# Patient Record
Sex: MALE | Race: WHITE | NOT HISPANIC OR LATINO | Employment: OTHER | URBAN - NONMETROPOLITAN AREA
[De-identification: names, ages, dates, MRNs, and addresses within clinical notes are randomized per-mention and may not be internally consistent; named-entity substitution may affect disease eponyms.]

---

## 2023-12-07 ENCOUNTER — TELEPHONE (OUTPATIENT)
Dept: VASCULAR SURGERY | Facility: CLINIC | Age: 48
End: 2023-12-07

## 2023-12-11 ENCOUNTER — TELEPHONE (OUTPATIENT)
Dept: VASCULAR SURGERY | Facility: CLINIC | Age: 48
End: 2023-12-11
Payer: COMMERCIAL

## 2023-12-12 ENCOUNTER — OFFICE VISIT (OUTPATIENT)
Dept: VASCULAR SURGERY | Facility: CLINIC | Age: 48
End: 2023-12-12
Payer: COMMERCIAL

## 2023-12-12 VITALS
DIASTOLIC BLOOD PRESSURE: 80 MMHG | HEIGHT: 71 IN | HEART RATE: 90 BPM | WEIGHT: 187.4 LBS | SYSTOLIC BLOOD PRESSURE: 128 MMHG | OXYGEN SATURATION: 96 % | BODY MASS INDEX: 26.23 KG/M2

## 2023-12-12 DIAGNOSIS — I73.9 PAD (PERIPHERAL ARTERY DISEASE): ICD-10-CM

## 2023-12-12 DIAGNOSIS — I65.23 BILATERAL CAROTID ARTERY STENOSIS: ICD-10-CM

## 2023-12-12 DIAGNOSIS — I71.21 ANEURYSM OF ASCENDING AORTA WITHOUT RUPTURE: Primary | ICD-10-CM

## 2023-12-12 DIAGNOSIS — Z72.0 TOBACCO ABUSE: ICD-10-CM

## 2023-12-12 PROCEDURE — 99204 OFFICE O/P NEW MOD 45 MIN: CPT | Performed by: SURGERY

## 2023-12-12 RX ORDER — FOLIC ACID 1 MG/1
TABLET ORAL
COMMUNITY
Start: 2023-11-02

## 2023-12-12 RX ORDER — PAROXETINE HYDROCHLORIDE 40 MG/1
1 TABLET, FILM COATED ORAL DAILY
COMMUNITY
Start: 2023-10-02

## 2023-12-12 RX ORDER — HYDROXYZINE HYDROCHLORIDE 25 MG/1
25 TABLET, FILM COATED ORAL 3 TIMES DAILY PRN
COMMUNITY

## 2023-12-12 RX ORDER — DIAZEPAM 5 MG/1
5 TABLET ORAL EVERY 8 HOURS PRN
COMMUNITY
Start: 2023-10-02

## 2023-12-12 RX ORDER — TRAZODONE HYDROCHLORIDE 100 MG/1
100 TABLET ORAL DAILY
COMMUNITY

## 2023-12-12 RX ORDER — NALTREXONE HYDROCHLORIDE 50 MG/1
1 TABLET, FILM COATED ORAL DAILY
COMMUNITY
Start: 2023-10-02

## 2023-12-12 RX ORDER — BUSPIRONE HYDROCHLORIDE 15 MG/1
15 TABLET ORAL 3 TIMES DAILY
COMMUNITY

## 2023-12-12 RX ORDER — ASPIRIN 81 MG/1
81 TABLET ORAL DAILY
Start: 2023-12-12

## 2023-12-12 RX ORDER — SERTRALINE HYDROCHLORIDE 100 MG/1
2 TABLET, FILM COATED ORAL DAILY
COMMUNITY
Start: 2023-11-14

## 2023-12-12 RX ORDER — QUETIAPINE FUMARATE 100 MG/1
100 TABLET, FILM COATED ORAL
COMMUNITY

## 2023-12-12 RX ORDER — PANTOPRAZOLE SODIUM 40 MG/1
40 TABLET, DELAYED RELEASE ORAL DAILY
COMMUNITY

## 2023-12-12 RX ORDER — ATORVASTATIN CALCIUM 10 MG/1
10 TABLET, FILM COATED ORAL DAILY
Qty: 90 TABLET | Refills: 0 | Status: SHIPPED | OUTPATIENT
Start: 2023-12-12

## 2023-12-12 RX ORDER — PRAZOSIN HYDROCHLORIDE 2 MG/1
2 CAPSULE ORAL NIGHTLY
COMMUNITY
Start: 2023-12-01

## 2023-12-12 RX ORDER — NICOTINE 14 MG/24H
1 PATCH, EXTENDED RELEASE TRANSDERMAL EVERY 24 HOURS
COMMUNITY
Start: 2023-10-16

## 2023-12-12 NOTE — PROGRESS NOTES
12/12/2023      Red Pittman MD  [address] [place] [id number]    Nic Castro  1975    Chief Complaint   Patient presents with    Aortic Aneurysm     RED PITTMAN TO VASCULAR SURGERY FOR THORACIC AORTIC ANEURYSM WITHOUT RUPTURE UNSPECIFIED. 5.4  Smoker- 30 years 1/2 pack a day, dizziness, anxiety up, energy is low  Would like something for nerves       Dear Red Pittman MD    HPI  I had the pleasure of seeing your patient Nic Castro in the office today.  Thank you kindly for this consultation.  As you recall, Nic Castro is a 48 y.o.  male who you are currently following for routine health maintenance.  He was found to have an ascending aortic aneurysm measuring 5.9 cm.  He is anxious.  He is a current daily smoker.  He is having some shortness of breath with exertion and dizziness.  He did have noninvasive testing performed which I did personally review.        Past Medical History:   Diagnosis Date    Anxiety     Pancreatitis     Substance abuse        Past Surgical History:   Procedure Laterality Date    ANKLE LIGAMENT RECONSTRUCTION Left 2015       No family history on file.    Social History     Socioeconomic History    Marital status:    Tobacco Use    Smoking status: Every Day     Packs/day: .5     Types: Cigarettes     Start date: 2003    Smokeless tobacco: Never   Substance and Sexual Activity    Alcohol use: Not Currently    Drug use: Never    Sexual activity: Defer       Not on File      Current Outpatient Medications:     hydrOXYzine (ATARAX) 25 MG tablet, Take 1 tablet by mouth 3 (Three) Times a Day As Needed., Disp: , Rfl:     PARoxetine (PAXIL) 40 MG tablet, Take 1 tablet by mouth Daily., Disp: , Rfl:     prazosin (MINIPRESS) 2 MG capsule, Take 1 capsule by mouth Every Night., Disp: , Rfl:     QUEtiapine (SEROquel) 100 MG tablet, Take 1 tablet by mouth every night at bedtime., Disp: , Rfl:     sertraline (ZOLOFT) 100 MG tablet, Take 2 tablets by mouth  "Daily., Disp: , Rfl:     traZODone (DESYREL) 100 MG tablet, Take 1 tablet by mouth Daily., Disp: , Rfl:     aspirin 81 MG EC tablet, Take 1 tablet by mouth Daily., Disp: , Rfl:     atorvastatin (Lipitor) 10 MG tablet, Take 1 tablet by mouth Daily., Disp: 90 tablet, Rfl: 0    busPIRone (BUSPAR) 15 MG tablet, Take 1 tablet by mouth 3 (Three) Times a Day. (Patient not taking: Reported on 12/12/2023), Disp: , Rfl:     diazePAM (VALIUM) 5 MG tablet, Take 1 tablet by mouth Every 8 (Eight) Hours As Needed. for anxiety (Patient not taking: Reported on 12/12/2023), Disp: , Rfl:     folic acid (FOLVITE) 1 MG tablet, , Disp: , Rfl:     naltrexone (DEPADE) 50 MG tablet, Take 1 tablet by mouth Daily. (Patient not taking: Reported on 12/12/2023), Disp: , Rfl:     pantoprazole (PROTONIX) 40 MG EC tablet, Take 1 tablet by mouth Daily. (Patient not taking: Reported on 12/12/2023), Disp: , Rfl:     SM Nicotine 14 MG/24HR patch, Place 1 patch on the skin as directed by provider Daily. (Patient not taking: Reported on 12/12/2023), Disp: , Rfl:     Review of Systems   Constitutional: Negative.    HENT: Negative.     Eyes: Negative.    Respiratory:  Positive for shortness of breath.    Cardiovascular: Negative.    Gastrointestinal: Negative.    Endocrine: Negative.    Genitourinary: Negative.    Musculoskeletal: Negative.    Skin: Negative.    Allergic/Immunologic: Negative.    Neurological:  Positive for dizziness.   Hematological: Negative.    Psychiatric/Behavioral:  The patient is nervous/anxious.    All other systems reviewed and are negative.    /80 (BP Location: Left arm, Patient Position: Sitting, Cuff Size: Adult)   Pulse 90   Ht 180.3 cm (71\")   Wt 85 kg (187 lb 6.4 oz)   SpO2 96%   BMI 26.14 kg/m²     Physical Exam  Vitals and nursing note reviewed.   Constitutional:       Appearance: Normal appearance. He is well-developed.   HENT:      Head: Normocephalic and atraumatic.   Eyes:      General: No scleral icterus.   "   Pupils: Pupils are equal, round, and reactive to light.   Neck:      Thyroid: No thyromegaly.      Vascular: No carotid bruit or JVD.   Cardiovascular:      Rate and Rhythm: Normal rate and regular rhythm.      Pulses:           Carotid pulses are 2+ on the right side and 2+ on the left side.       Femoral pulses are 2+ on the right side and 2+ on the left side.       Popliteal pulses are 2+ on the right side and 2+ on the left side.        Dorsalis pedis pulses are 2+ on the right side and 2+ on the left side.        Posterior tibial pulses are 2+ on the right side and 2+ on the left side.      Heart sounds: Normal heart sounds.   Pulmonary:      Effort: Pulmonary effort is normal.      Breath sounds: Normal breath sounds.   Abdominal:      General: Bowel sounds are normal. There is no distension or abdominal bruit.      Palpations: Abdomen is soft. There is no mass.      Tenderness: There is no abdominal tenderness.   Musculoskeletal:         General: Normal range of motion.      Cervical back: Neck supple.   Lymphadenopathy:      Cervical: No cervical adenopathy.   Skin:     General: Skin is warm and dry.   Neurological:      Mental Status: He is alert and oriented to person, place, and time.      Cranial Nerves: No cranial nerve deficit.      Sensory: No sensory deficit.         There is no problem list on file for this patient.       Diagnosis Plan   1. Aneurysm of ascending aorta without rupture  Ambulatory Referral to Cardiothoracic Surgery      2. Bilateral carotid artery stenosis  US Carotid Bilateral      3. PAD (peripheral artery disease)  US Ankle / Brachial Indices Extremity Complete      4. Tobacco abuse            Plan: After thoroughly evaluating Nic Castro, I believe the best course of action is to refer to cardiothoracic for aneurysm repair. I did review his testing which shows ascending aortic aneurysm measuring 5.9 cm.   I would like him to begin aspirin EC 81 mg daily and Lipitor 10 mg  daily in addition to his other medications.  I will see him back in 6 months with screening carotid duplex and ABIs.  Previous ultrasound did not show abdominal aneurysm.  We did discuss smoking cessation and that smoking is the #1 risk factor for aneurysm formation and rapid growth. The patient is to continue taking their medications as previously discussed.   This was all discussed in full with complete understanding.  Thank you for allowing me to participate in the care of your patient.  Please do not hesitate to call with any questions or concerns.  We will keep you aware of any further encounters with Nic Stewartland.        Sincerely yours,         DO Toya Wang Allen Lee, MD

## 2023-12-21 ENCOUNTER — LAB (OUTPATIENT)
Dept: LAB | Facility: HOSPITAL | Age: 48
End: 2023-12-21
Payer: COMMERCIAL

## 2023-12-21 ENCOUNTER — OFFICE VISIT (OUTPATIENT)
Dept: CARDIAC SURGERY | Facility: CLINIC | Age: 48
End: 2023-12-21
Payer: COMMERCIAL

## 2023-12-21 VITALS
HEIGHT: 71 IN | OXYGEN SATURATION: 98 % | SYSTOLIC BLOOD PRESSURE: 121 MMHG | BODY MASS INDEX: 26.74 KG/M2 | WEIGHT: 191 LBS | HEART RATE: 102 BPM | DIASTOLIC BLOOD PRESSURE: 82 MMHG

## 2023-12-21 DIAGNOSIS — I71.21 ANEURYSM OF THE ASCENDING AORTA, WITHOUT RUPTURE: ICD-10-CM

## 2023-12-21 DIAGNOSIS — I71.21 ANEURYSM OF THE ASCENDING AORTA, WITHOUT RUPTURE: Primary | ICD-10-CM

## 2023-12-21 DIAGNOSIS — R73.9 HYPERGLYCEMIA: Primary | ICD-10-CM

## 2023-12-21 DIAGNOSIS — R73.9 HYPERGLYCEMIA: ICD-10-CM

## 2023-12-21 LAB
ALBUMIN SERPL-MCNC: 4.2 G/DL (ref 3.5–5.2)
ALBUMIN/GLOB SERPL: 1.4 G/DL
ALP SERPL-CCNC: 83 U/L (ref 39–117)
ALT SERPL W P-5'-P-CCNC: 61 U/L (ref 1–41)
ANION GAP SERPL CALCULATED.3IONS-SCNC: 10 MMOL/L (ref 5–15)
AST SERPL-CCNC: 47 U/L (ref 1–40)
BASOPHILS # BLD AUTO: 0.07 10*3/MM3 (ref 0–0.2)
BASOPHILS NFR BLD AUTO: 0.8 % (ref 0–1.5)
BILIRUB SERPL-MCNC: 0.5 MG/DL (ref 0–1.2)
BUN SERPL-MCNC: 9 MG/DL (ref 6–20)
BUN/CREAT SERPL: 11.1 (ref 7–25)
CALCIUM SPEC-SCNC: 9.4 MG/DL (ref 8.6–10.5)
CHLORIDE SERPL-SCNC: 102 MMOL/L (ref 98–107)
CO2 SERPL-SCNC: 26 MMOL/L (ref 22–29)
CREAT SERPL-MCNC: 0.81 MG/DL (ref 0.76–1.27)
DEPRECATED RDW RBC AUTO: 40.3 FL (ref 37–54)
EGFRCR SERPLBLD CKD-EPI 2021: 107.4 ML/MIN/1.73
EOSINOPHIL # BLD AUTO: 0.36 10*3/MM3 (ref 0–0.4)
EOSINOPHIL NFR BLD AUTO: 4 % (ref 0.3–6.2)
ERYTHROCYTE [DISTWIDTH] IN BLOOD BY AUTOMATED COUNT: 13.2 % (ref 12.3–15.4)
GLOBULIN UR ELPH-MCNC: 3.1 GM/DL
GLUCOSE SERPL-MCNC: 92 MG/DL (ref 65–99)
HBA1C MFR BLD: 6.2 % (ref 4.8–5.6)
HCT VFR BLD AUTO: 45.5 % (ref 37.5–51)
HGB BLD-MCNC: 14.6 G/DL (ref 13–17.7)
IMM GRANULOCYTES # BLD AUTO: 0.03 10*3/MM3 (ref 0–0.05)
IMM GRANULOCYTES NFR BLD AUTO: 0.3 % (ref 0–0.5)
LYMPHOCYTES # BLD AUTO: 2.35 10*3/MM3 (ref 0.7–3.1)
LYMPHOCYTES NFR BLD AUTO: 26.3 % (ref 19.6–45.3)
MCH RBC QN AUTO: 27.3 PG (ref 26.6–33)
MCHC RBC AUTO-ENTMCNC: 32.1 G/DL (ref 31.5–35.7)
MCV RBC AUTO: 85 FL (ref 79–97)
MONOCYTES # BLD AUTO: 0.91 10*3/MM3 (ref 0.1–0.9)
MONOCYTES NFR BLD AUTO: 10.2 % (ref 5–12)
NEUTROPHILS NFR BLD AUTO: 5.2 10*3/MM3 (ref 1.7–7)
NEUTROPHILS NFR BLD AUTO: 58.4 % (ref 42.7–76)
NRBC BLD AUTO-RTO: 0 /100 WBC (ref 0–0.2)
PLATELET # BLD AUTO: 211 10*3/MM3 (ref 140–450)
PMV BLD AUTO: 9.5 FL (ref 6–12)
POTASSIUM SERPL-SCNC: 4.3 MMOL/L (ref 3.5–5.2)
PROT SERPL-MCNC: 7.3 G/DL (ref 6–8.5)
RBC # BLD AUTO: 5.35 10*6/MM3 (ref 4.14–5.8)
SODIUM SERPL-SCNC: 138 MMOL/L (ref 136–145)
WBC NRBC COR # BLD AUTO: 8.92 10*3/MM3 (ref 3.4–10.8)

## 2023-12-21 PROCEDURE — 80053 COMPREHEN METABOLIC PANEL: CPT

## 2023-12-21 PROCEDURE — 36415 COLL VENOUS BLD VENIPUNCTURE: CPT

## 2023-12-21 PROCEDURE — 85025 COMPLETE CBC W/AUTO DIFF WBC: CPT

## 2023-12-21 PROCEDURE — 83036 HEMOGLOBIN GLYCOSYLATED A1C: CPT

## 2023-12-21 RX ORDER — ARIPIPRAZOLE 5 MG/1
1 TABLET ORAL DAILY
COMMUNITY
Start: 2023-12-19

## 2023-12-21 RX ORDER — CLONAZEPAM 0.5 MG/1
1 TABLET ORAL EVERY 12 HOURS SCHEDULED
COMMUNITY
Start: 2023-12-19

## 2023-12-21 NOTE — PROGRESS NOTES
Cardiothoracic Surgery Consultation    Referring Physician: Dr. Kyle Villagran    Primary Care Physician: Dr. Jhon Pittman    Chief Complaint   Patient presents with    Aortic Aneurysm     New patient from Dr Villagran         Subjective     History of Present Illness    Nic Castro is a 48-year-old male who presents for evaluation of an aortic aneurysm. He is accompanied by his wife.    The patient reports that about 4 years ago, he was having some dizziness and had a strange sharp pain in his stomach. The abdominal pain did not last very long, but he still felt dizzy, so he went to the hospital. He was discovered to have an aortic aneurysm that was 3.5 cm, however they would not operate on them until they were 5.5 cm. He had another CT scan between then and the one he just had, and it showed the aortic aneurysm in his chest was changing.     He has received conflicting information about the size of his aortic aneurysm.     He denies chest pain but does sometimes feels tight in his chest.    He denies any heart problems before. He denies any stroke or mini stroke that he is aware of. He is otherwise healthy.    He denies any surgery on his heart, lungs, or chest.     He does not go to the dentist regularly. He has 6 teeth that could be of concern. He does not have dental insurance.    He has no known family history of aortic aneurysms.       He smokes about half a pack of cigarettes a day. He has been trying to quit lately. He denies any illegal drug use.    He denies any family history of aortic aneurysm.      Review of Systems     A complete review of systems was performed, is negative except stated above.    Past Medical History:   Diagnosis Date    Anxiety     Pancreatitis     Substance abuse      Past Surgical History:   Procedure Laterality Date    ANKLE LIGAMENT RECONSTRUCTION Left 2015     History reviewed. No pertinent family history.  Social History     Tobacco Use    Smoking status: Every Day      "Packs/day: .5     Types: Cigarettes     Start date: 2003    Smokeless tobacco: Never   Substance Use Topics    Alcohol use: Not Currently    Drug use: Never     Current Outpatient Medications   Medication Sig Dispense Refill    ARIPiprazole (ABILIFY) 5 MG tablet Take 1 tablet by mouth Daily.      aspirin 81 MG EC tablet Take 1 tablet by mouth Daily.      atorvastatin (Lipitor) 10 MG tablet Take 1 tablet by mouth Daily. 90 tablet 0    busPIRone (BUSPAR) 15 MG tablet Take 1 tablet by mouth 3 (Three) Times a Day.      clonazePAM (KlonoPIN) 0.5 MG tablet Take 1 tablet by mouth Every 12 (Twelve) Hours.      diazePAM (VALIUM) 5 MG tablet Take 1 tablet by mouth Every 8 (Eight) Hours As Needed. for anxiety      folic acid (FOLVITE) 1 MG tablet       hydrOXYzine (ATARAX) 25 MG tablet Take 1 tablet by mouth 3 (Three) Times a Day As Needed.      naltrexone (DEPADE) 50 MG tablet Take 1 tablet by mouth Daily.      pantoprazole (PROTONIX) 40 MG EC tablet Take 1 tablet by mouth Daily.      PARoxetine (PAXIL) 40 MG tablet Take 1 tablet by mouth Daily.      prazosin (MINIPRESS) 2 MG capsule Take 1 capsule by mouth Every Night.      QUEtiapine (SEROquel) 100 MG tablet Take 1 tablet by mouth every night at bedtime.      sertraline (ZOLOFT) 100 MG tablet Take 2 tablets by mouth Daily.      SM Nicotine 14 MG/24HR patch Place 1 patch on the skin as directed by provider Daily.      traZODone (DESYREL) 100 MG tablet Take 1 tablet by mouth Daily.       No current facility-administered medications for this visit.     Allergies:  Patient has no known allergies.    Objective      Vital Signs  Visit Vitals  /82 (BP Location: Right arm, Patient Position: Sitting, Cuff Size: Adult)   Pulse 102   Ht 180.3 cm (71\")   Wt 86.6 kg (191 lb)   SpO2 98%   BMI 26.64 kg/m²         Physical Exam  Vitals and nursing note reviewed.   Constitutional:       Appearance: He is well-developed.   HENT:      Head: Normocephalic.   Neck:      Thyroid: No " thyroid mass.      Vascular: No carotid bruit or JVD.      Trachea: Trachea and phonation normal.   Cardiovascular:      Rate and Rhythm: Normal rate and regular rhythm.      Pulses:           Radial pulses are 2+ on the right side and 2+ on the left side.        Posterior tibial pulses are 2+ on the right side and 2+ on the left side.      Heart sounds: Normal heart sounds. No murmur heard.     No friction rub. No gallop.   Pulmonary:      Effort: Pulmonary effort is normal. No respiratory distress.      Breath sounds: Normal breath sounds. No wheezing or rales.   Musculoskeletal:         General: No swelling. Normal range of motion.      Cervical back: Neck supple.   Skin:     General: Skin is warm and dry.      Capillary Refill: Capillary refill takes less than 2 seconds.      Findings: No rash.   Neurological:      Mental Status: He is alert and oriented to person, place, and time.   Psychiatric:         Speech: Speech normal.         Behavior: Behavior normal.         Thought Content: Thought content normal.         Judgment: Judgment normal.         Results Review:   WBC   Date Value Ref Range Status   12/21/2023 8.92 3.40 - 10.80 10*3/mm3 Final     RBC   Date Value Ref Range Status   12/21/2023 5.35 4.14 - 5.80 10*6/mm3 Final     Hemoglobin   Date Value Ref Range Status   12/21/2023 14.6 13.0 - 17.7 g/dL Final     Hematocrit   Date Value Ref Range Status   12/21/2023 45.5 37.5 - 51.0 % Final     MCV   Date Value Ref Range Status   12/21/2023 85.0 79.0 - 97.0 fL Final     MCH   Date Value Ref Range Status   12/21/2023 27.3 26.6 - 33.0 pg Final     MCHC   Date Value Ref Range Status   12/21/2023 32.1 31.5 - 35.7 g/dL Final     RDW   Date Value Ref Range Status   12/21/2023 13.2 12.3 - 15.4 % Final     RDW-SD   Date Value Ref Range Status   12/21/2023 40.3 37.0 - 54.0 fl Final     MPV   Date Value Ref Range Status   12/21/2023 9.5 6.0 - 12.0 fL Final     Platelets   Date Value Ref Range Status   12/21/2023 211  140 - 450 10*3/mm3 Final     Neutrophil %   Date Value Ref Range Status   12/21/2023 58.4 42.7 - 76.0 % Final     Lymphocyte %   Date Value Ref Range Status   12/21/2023 26.3 19.6 - 45.3 % Final     Monocyte %   Date Value Ref Range Status   12/21/2023 10.2 5.0 - 12.0 % Final     Eosinophil %   Date Value Ref Range Status   12/21/2023 4.0 0.3 - 6.2 % Final     Basophil %   Date Value Ref Range Status   12/21/2023 0.8 0.0 - 1.5 % Final     Immature Grans %   Date Value Ref Range Status   12/21/2023 0.3 0.0 - 0.5 % Final     Neutrophils, Absolute   Date Value Ref Range Status   12/21/2023 5.20 1.70 - 7.00 10*3/mm3 Final     Lymphocytes, Absolute   Date Value Ref Range Status   12/21/2023 2.35 0.70 - 3.10 10*3/mm3 Final     Monocytes, Absolute   Date Value Ref Range Status   12/21/2023 0.91 (H) 0.10 - 0.90 10*3/mm3 Final     Eosinophils, Absolute   Date Value Ref Range Status   12/21/2023 0.36 0.00 - 0.40 10*3/mm3 Final     Basophils, Absolute   Date Value Ref Range Status   12/21/2023 0.07 0.00 - 0.20 10*3/mm3 Final     Immature Grans, Absolute   Date Value Ref Range Status   12/21/2023 0.03 0.00 - 0.05 10*3/mm3 Final     nRBC   Date Value Ref Range Status   12/21/2023 0.0 0.0 - 0.2 /100 WBC Final     Glucose   Date Value Ref Range Status   12/21/2023 92 65 - 99 mg/dL Final     Sodium   Date Value Ref Range Status   12/21/2023 138 136 - 145 mmol/L Final   11/06/2023 139 135 - 145 mmol/L Final     Potassium   Date Value Ref Range Status   12/21/2023 4.3 3.5 - 5.2 mmol/L Final     Comment:     Slight hemolysis detected by analyzer. Result may be falsely elevated.   11/06/2023 3.7 3.5 - 5.0 mmol/L Final     CO2   Date Value Ref Range Status   12/21/2023 26.0 22.0 - 29.0 mmol/L Final     Total CO2   Date Value Ref Range Status   11/06/2023 29 21 - 32 mmol/L Final     Chloride   Date Value Ref Range Status   12/21/2023 102 98 - 107 mmol/L Final   11/06/2023 102 98 - 107 mmol/L Final     Anion Gap   Date Value Ref Range  Status   12/21/2023 10.0 5.0 - 15.0 mmol/L Final   11/06/2023 8 6 - 16 mmol/L Final     Creatinine   Date Value Ref Range Status   12/21/2023 0.81 0.76 - 1.27 mg/dL Final   11/06/2023 0.92 0.60 - 1.30 mg/dL Final     BUN   Date Value Ref Range Status   12/21/2023 9 6 - 20 mg/dL Final   11/06/2023 10 7 - 18 mg/dL Final     BUN/Creatinine Ratio   Date Value Ref Range Status   12/21/2023 11.1 7.0 - 25.0 Final   11/06/2023 10.9 11.7 - 13.9 Final     Calcium   Date Value Ref Range Status   12/21/2023 9.4 8.6 - 10.5 mg/dL Final   11/06/2023 9.0 8.5 - 10.1 mg/dL Final     Comment:     Calcium measurements are adversely affected by the use of Omniscan during MRI. Analysis of calcium is not recommended for 12 to 24 hours after the use of the contrast agent.      eGFR Non  Am   Date Value Ref Range Status   11/06/2023 >60.0 >=60.0 mL/min/1.73m2 Final     Alkaline Phosphatase   Date Value Ref Range Status   12/21/2023 83 39 - 117 U/L Final   11/06/2023 62 50 - 136 U/L Final     Total Protein   Date Value Ref Range Status   12/21/2023 7.3 6.0 - 8.5 g/dL Final   11/06/2023 7.2 6.4 - 8.2 g/dL Final     ALT (SGPT)   Date Value Ref Range Status   12/21/2023 61 (H) 1 - 41 U/L Final   11/06/2023 62 16 - 62 U/L Final     AST (SGOT)   Date Value Ref Range Status   12/21/2023 47 (H) 1 - 40 U/L Final   11/06/2023 37 (H) 7 - 34 U/L Final     Total Bilirubin   Date Value Ref Range Status   12/21/2023 0.5 0.0 - 1.2 mg/dL Final   11/06/2023 0.3 0.0 - 1.0 mg/dL Final     Comment:     Use of this assay is not recommended for patients undergoing treatment with eltrombopag due to the potential for falsely elevated results.     Albumin   Date Value Ref Range Status   12/21/2023 4.2 3.5 - 5.2 g/dL Final   11/06/2023 3.6 3.4 - 5.0 g/dL Final     Globulin   Date Value Ref Range Status   12/21/2023 3.1 gm/dL Final        I reviewed the patient's clinical results and discussed with patient.    CT Chest:: CT chest with contrast performed on  11/06/2023.  FINDINGS:     Lung parenchyma and central airways: Normal.   Pleura: Normal.   Heart and great vessels: Heart size is normal. Some coronary artery   calcification is noted. There is also some scattered ossification of   the pericardium. There is significant enlargement of the ascending   thoracic aorta. The diameter of the thoracic aorta at the level of the   sinuses of Valsalva and sinotubular junction is about 5.4 cm. No   evidence of dissection. There are no prior exam for comparison. The   aorta tapers up to the aortic arch where it has a more normal caliber.   Caliber is also normal in descending thoracic aorta. Small amount of   calcification in some of the great vessels and coronary arteries.   Mediastinum and dana: Small amount of calcification in the mediastinum   consistent was some old granulomatous infection.   Chest wall: Normal.   Spine and other bones: Mild degenerative thoracic spondylosis.   Lower neck: Normal.   Upper abdomen: Normal.     IMPRESSION:     1. Prominent aneurysmal enlargement of the ascending thoracic aorta.   No evidence of dissection or other acute change.   2. Atherosclerotic calcification and coronary arteries consistent with   coronary artery disease.   3. Some pericardial calcification is noted. This may result in   significant restricted diastolic dysfunction.     ECHO:    I personally reviewed images of following exams, the following is my interpretation:    CT Chest: CT chest shows severely dilated aneurysmal aortic root. I measured it to be 6.1 cm in maximum dimension extends to the level of the root. Aortic arch has a normal branching pattern and the aorta tapers to a 3.9 cm distal ascending/proximal arch. No dissection, IMH, or VANNA. Lungs appear normal.        Assessment & Plan     Mr. Castro is a 48-year-old male who presents to me with aortic root and ascending aortic aneurysm. This measures 6.1 cm in maximum dimension. He has known about this for many  years and per report back in 2019, it measured 5.6 cm. He was referred to Dr. Villagran and subsequently to me. He does not have any symptoms from this. He is a smoker. He has a history of pancreatitis, substance abuse, and anxiety, but he is sober and clean now. He has never had surgery on his heart, lungs, or chest.    I discussed with him and his wife today the natural history of aortic root aneurysms. He has not had an echocardiogram yet. I will obtain one to assess his valve anatomy and valve dysfunction, but I suspect bicuspid valve given the appearance of his aneurysm. We discussed treatment options, and I would recommend surgical replacement of his aorta given its large size at 6.1 cm. The risk of dissection is much higher than the risk of surgery at this point. We discussed preoperative, operative, postoperative expectations at length as well as the risk and benefits of surgery.     We discussed the risk of surgery including but not limited to bleeding, infection, injury to major organs or vessels, chronic heart failure, arrhythmias, need for pacemaker, prolonged ventilation, renal failure, stroke, risk of anesthesia, risk of sternal wound or bone healing problems, reoperation, and/or death. He is going to try to get a dental appointment as soon as possible and if he is unable by next week, he will call and discuss this with us further. I will refer him to Dr. Nikolas Ramos for a cardiac catheterization given his smoking history and age. I discussed with him valve options of biologic versus mechanical and he wants to think about it. I will discuss this with him further prior to surgery.    I will complete his work-up and plan for surgery soon. Thank you for trusting me with the care of Mr. Castro. Please do not hesitate to call with questions or concerns.         Sandeep Duran MD  Cardiothoracic Surgeon    Transcribed from ambient dictation for Sandeep Duran MD by Rima Perry.  12/21/23   15:06  CST    Patient or patient representative verbalized consent to the visit recording.  I have personally performed the services described in this document as transcribed by the above individual, and it is both accurate and complete.

## 2023-12-21 NOTE — LETTER
December 26, 2023       No Recipients    Patient: Nic Castro   YOB: 1975   Date of Visit: 12/21/2023       Dear Jhon Pittman MD,    Nic Castro was in my office today. Below are the relevant portions of my assessment and plan of care.    Mr. Castro is a 48-year-old male who presents to me with aortic root and ascending aortic aneurysm. This measures 6.1 cm in maximum dimension. He has known about this for many years and per report back in 2019, it measured 5.6 cm. He was referred to Dr. Villagran and subsequently to me. He does not have any symptoms from this. He is a smoker. He has a history of pancreatitis, substance abuse, and anxiety, but he is sober and clean now. He has never had surgery on his heart, lungs, or chest.    I discussed with him and his wife today the natural history of aortic root aneurysms. He has not had an echocardiogram yet. I will obtain one to assess his valve anatomy and valve dysfunction, but I suspect bicuspid valve given the appearance of his aneurysm. We discussed treatment options, and I would recommend surgical replacement of his aorta given its large size at 6.1 cm. The risk of dissection is much higher than the risk of surgery at this point. We discussed preoperative, operative, postoperative expectations at length as well as the risk and benefits of surgery.     We discussed the risk of surgery including but not limited to bleeding, infection, injury to major organs or vessels, chronic heart failure, arrhythmias, need for pacemaker, prolonged ventilation, renal failure, stroke, risk of anesthesia, risk of sternal wound or bone healing problems, reoperation, and/or death. He is going to try to get a dental appointment as soon as possible and if he is unable by next week, he will call and discuss this with us further. I will refer him to Dr. Nikolas Ramos for a cardiac catheterization given his smoking history and age. I discussed with him valve options of  biologic versus mechanical and he wants to think about it. I will discuss this with him further prior to surgery.    I will complete his work-up and plan for surgery soon. Thank you for trusting me with the care of Mr. Castro. Please do not hesitate to call with questions or concerns.         Sincerely,        Sandeep Duran MD        CC:   No Recipients

## 2023-12-22 ENCOUNTER — OFFICE VISIT (OUTPATIENT)
Dept: CARDIOLOGY | Facility: CLINIC | Age: 48
End: 2023-12-22
Payer: COMMERCIAL

## 2023-12-22 ENCOUNTER — PREP FOR SURGERY (OUTPATIENT)
Dept: OTHER | Facility: HOSPITAL | Age: 48
End: 2023-12-22
Payer: COMMERCIAL

## 2023-12-22 VITALS
HEART RATE: 92 BPM | DIASTOLIC BLOOD PRESSURE: 60 MMHG | OXYGEN SATURATION: 98 % | SYSTOLIC BLOOD PRESSURE: 90 MMHG | WEIGHT: 191 LBS | HEIGHT: 71 IN | BODY MASS INDEX: 26.74 KG/M2

## 2023-12-22 DIAGNOSIS — R07.9 CHEST PAIN, UNSPECIFIED TYPE: ICD-10-CM

## 2023-12-22 DIAGNOSIS — I71.21 ANEURYSM OF THE ASCENDING AORTA, WITHOUT RUPTURE: Primary | ICD-10-CM

## 2023-12-22 DIAGNOSIS — I10 ESSENTIAL HYPERTENSION: ICD-10-CM

## 2023-12-22 DIAGNOSIS — E78.5 DYSLIPIDEMIA: ICD-10-CM

## 2023-12-22 DIAGNOSIS — Z72.0 TOBACCO USE: ICD-10-CM

## 2023-12-22 PROCEDURE — 99204 OFFICE O/P NEW MOD 45 MIN: CPT | Performed by: INTERNAL MEDICINE

## 2023-12-22 PROCEDURE — 93000 ELECTROCARDIOGRAM COMPLETE: CPT | Performed by: INTERNAL MEDICINE

## 2023-12-22 RX ORDER — SODIUM CHLORIDE 0.9 % (FLUSH) 0.9 %
10 SYRINGE (ML) INJECTION AS NEEDED
OUTPATIENT
Start: 2023-12-22

## 2023-12-22 RX ORDER — SODIUM CHLORIDE 9 MG/ML
40 INJECTION, SOLUTION INTRAVENOUS AS NEEDED
OUTPATIENT
Start: 2023-12-22

## 2023-12-22 RX ORDER — SODIUM CHLORIDE 0.9 % (FLUSH) 0.9 %
10 SYRINGE (ML) INJECTION EVERY 12 HOURS SCHEDULED
OUTPATIENT
Start: 2023-12-22

## 2023-12-22 RX ORDER — NICOTINE 21 MG/24HR
1 PATCH, TRANSDERMAL 24 HOURS TRANSDERMAL EVERY 24 HOURS
Qty: 28 PATCH | Refills: 5 | Status: SHIPPED | OUTPATIENT
Start: 2023-12-22

## 2023-12-22 NOTE — LETTER
December 22, 2023     Sandeep Duran MD  [address]  [address]  [facility] [address]    Patient: Nic Castro   YOB: 1975   Date of Visit: 12/22/2023       Dear Sandeep Duran MD,    Thank you for referring Nic Castro to me for evaluation. Below is a copy of my consult note.    If you have questions, please do not hesitate to call me. I look forward to following Nic along with you.         Sincerely,        Nikolas Ramos MD        CC: Jhon Pittman MD      Reason for Visit: Ascending aortic aneurysm.    HPI:  Nic Castro is a 48 y.o. male is being seen for consultation today at the request of Sandeep Duran MD secondary to ascending aortic aneurysm.  CT chest from 11/6/2023 reported ascending aortic aneurysm measuring 5.4 cm at the sinuses of Valsalva and sinotubular junction.  He is planning on surgical repair in the near future with Dr. Duran.  He will need coronary angiography prior to surgery.  He denies any palpitations, syncope, PND, or orthopnea.  He does not problems with food and  his stomach will get bloated after meals.  This happens with any type of fluid or liquid.  He notes occasional sharp chest pains that last about 5 seconds.  He also notes intermittent dizziness.  He is doing nicotine patches to help him quit smoking.      Previous Cardiac Testing and Procedures:  -CT chest (11/6/2023) 5.4 cm at sinuses of Valsalva and sinotubular junction.    Lab data:  -Hemoglobin A1c (12/21/2023) 6.2%  -BMP (12/22/2023) creatinine 0.81, potassium 4.3, sodium 138    Patient Active Problem List   Diagnosis   • Aneurysm of the ascending aorta, without rupture   • Chest pain   • Dyslipidemia   • Tobacco use       Social History     Tobacco Use   • Smoking status: Every Day     Packs/day: .5     Types: Cigarettes     Start date: 2003   • Smokeless tobacco: Never   Vaping Use   • Vaping Use: Never used   Substance Use Topics   • Alcohol use: Not Currently   • Drug use: Never        History reviewed. No pertinent family history.    The following portions of the patient's history were reviewed and updated as appropriate: allergies, current medications, past family history, past medical history, past social history, past surgical history, and problem list.      Current Outpatient Medications:   •  ARIPiprazole (ABILIFY) 5 MG tablet, Take 1 tablet by mouth Daily., Disp: , Rfl:   •  aspirin 81 MG EC tablet, Take 1 tablet by mouth Daily., Disp: , Rfl:   •  atorvastatin (Lipitor) 10 MG tablet, Take 1 tablet by mouth Daily., Disp: 90 tablet, Rfl: 0  •  busPIRone (BUSPAR) 15 MG tablet, Take 1 tablet by mouth 3 (Three) Times a Day., Disp: , Rfl:   •  clonazePAM (KlonoPIN) 0.5 MG tablet, Take 1 tablet by mouth Every 12 (Twelve) Hours., Disp: , Rfl:   •  diazePAM (VALIUM) 5 MG tablet, Take 1 tablet by mouth Every 8 (Eight) Hours As Needed. for anxiety, Disp: , Rfl:   •  folic acid (FOLVITE) 1 MG tablet, , Disp: , Rfl:   •  hydrOXYzine (ATARAX) 25 MG tablet, Take 1 tablet by mouth 3 (Three) Times a Day As Needed., Disp: , Rfl:   •  naltrexone (DEPADE) 50 MG tablet, Take 1 tablet by mouth Daily., Disp: , Rfl:   •  pantoprazole (PROTONIX) 40 MG EC tablet, Take 1 tablet by mouth Daily., Disp: , Rfl:   •  PARoxetine (PAXIL) 40 MG tablet, Take 1 tablet by mouth Daily., Disp: , Rfl:   •  prazosin (MINIPRESS) 2 MG capsule, Take 1 capsule by mouth Every Night., Disp: , Rfl:   •  QUEtiapine (SEROquel) 100 MG tablet, Take 1 tablet by mouth every night at bedtime., Disp: , Rfl:   •  sertraline (ZOLOFT) 100 MG tablet, Take 2 tablets by mouth Daily., Disp: , Rfl:   •  SM Nicotine 14 MG/24HR patch, Place 1 patch on the skin as directed by provider Daily., Disp: , Rfl:   •  traZODone (DESYREL) 100 MG tablet, Take 1 tablet by mouth Daily., Disp: , Rfl:   •  nicotine (Nicoderm CQ) 21 MG/24HR patch, Place 1 patch on the skin as directed by provider Daily., Disp: 28 patch, Rfl: 5  •  nicotine polacrilex (Nicorette) 4  "MG gum, Chew 1 each As Needed for Smoking Cessation., Disp: 100 each, Rfl: 5    Review of Systems   Constitutional: Negative for chills and fever.   Cardiovascular:  Positive for chest pain. Negative for paroxysmal nocturnal dyspnea.   Respiratory:  Negative for cough and shortness of breath.    Skin:  Negative for rash.   Gastrointestinal:  Negative for abdominal pain and heartburn.   Neurological:  Positive for dizziness. Negative for numbness.       Objective  BP 90/60 (BP Location: Left arm, Patient Position: Sitting, Cuff Size: Adult)   Pulse 92   Ht 180.3 cm (70.98\")   Wt 86.6 kg (191 lb)   SpO2 98%   BMI 26.65 kg/m²   Constitutional:       Appearance: Well-developed and overweight.   HENT:      Head: Normocephalic and atraumatic.   Pulmonary:      Effort: Pulmonary effort is normal.      Breath sounds: Normal breath sounds.   Cardiovascular:      Normal rate. Regular rhythm.      Murmurs: There is no murmur.      No gallop.  No click.   Edema:     Peripheral edema absent.   Skin:     General: Skin is warm and dry.   Neurological:      Mental Status: Alert and oriented to person, place, and time.         ECG 12 Lead    Date/Time: 12/22/2023 11:40 AM  Performed by: Nikolas Ramos MD    Authorized by: Nikolas Ramos MD  Comparison: compared with previous ECG from 12/6/2023  Comparison to previous ECG: Inferior q waves are now present  Rhythm: sinus rhythm  Rate: normal  Q waves: III and aVF              ICD-10-CM ICD-9-CM   1. Aneurysm of the ascending aorta, without rupture  I71.21 441.2   2. Chest pain, unspecified type  R07.9 786.50   3. Essential hypertension  I10 401.9   4. Dyslipidemia  E78.5 272.4   5. Tobacco use  Z72.0 305.1         Assessment/Plan:  1.  Ascending aortic aneurysm: Reported as 5.4 cm on CT chest from 11/6/2023.  Planning on surgical repair in the near future with Dr. Duran.  Echo has been ordered and is pending.  Check a heart catheterization prior to surgery.    2.  Chest pain: " Somewhat atypical features.  Coronary angiography planned prior to heart catheterization to evaluate for any significant coronary artery disease.  Essential hypertension    3.  Essential hypertension: Managed on prazosin.  Blood pressure is in the low normal range today.  May need to discontinue this in the future if blood pressure remains low.    4.  Dyslipidemia: Continue atorvastatin and check a lipid panel prior to heart cath.    5.  Tobacco use: Nic Castro  reports that he has been smoking cigarettes. He started smoking about 20 years ago. He has been smoking an average of .5 packs per day. He has never used smokeless tobacco.. I have educated him on the risk of diseases from using tobacco products such as cancer, COPD, and heart disease.  I advised him to quit and he is willing to quit. We have discussed the following method/s for tobacco cessation:  Counseling Prescription Medicaiton.  A prescription was sent in for nicotine patches and gum.  Together we have set a quit date for 1 week from today.  He will follow up with me in 1 week or sooner to check on his progress.  I spent 4.5 minutes counseling the patient.

## 2023-12-22 NOTE — PROGRESS NOTES
Reason for Visit: Ascending aortic aneurysm.    HPI:  Nic Castro is a 48 y.o. male is being seen for consultation today at the request of Sandeep Duran MD secondary to ascending aortic aneurysm.  CT chest from 11/6/2023 reported ascending aortic aneurysm measuring 5.4 cm at the sinuses of Valsalva and sinotubular junction.  He is planning on surgical repair in the near future with Dr. Duran.  He will need coronary angiography prior to surgery.  He denies any palpitations, syncope, PND, or orthopnea.  He does not problems with food and  his stomach will get bloated after meals.  This happens with any type of fluid or liquid.  He notes occasional sharp chest pains that last about 5 seconds.  He also notes intermittent dizziness.  He is doing nicotine patches to help him quit smoking.      Previous Cardiac Testing and Procedures:  -CT chest (11/6/2023) 5.4 cm at sinuses of Valsalva and sinotubular junction.    Lab data:  -Hemoglobin A1c (12/21/2023) 6.2%  -BMP (12/22/2023) creatinine 0.81, potassium 4.3, sodium 138    Patient Active Problem List   Diagnosis    Aneurysm of the ascending aorta, without rupture    Chest pain    Dyslipidemia    Tobacco use       Social History     Tobacco Use    Smoking status: Every Day     Packs/day: .5     Types: Cigarettes     Start date: 2003    Smokeless tobacco: Never   Vaping Use    Vaping Use: Never used   Substance Use Topics    Alcohol use: Not Currently    Drug use: Never       History reviewed. No pertinent family history.    The following portions of the patient's history were reviewed and updated as appropriate: allergies, current medications, past family history, past medical history, past social history, past surgical history, and problem list.      Current Outpatient Medications:     ARIPiprazole (ABILIFY) 5 MG tablet, Take 1 tablet by mouth Daily., Disp: , Rfl:     aspirin 81 MG EC tablet, Take 1 tablet by mouth Daily., Disp: , Rfl:     atorvastatin (Lipitor) 10  MG tablet, Take 1 tablet by mouth Daily., Disp: 90 tablet, Rfl: 0    busPIRone (BUSPAR) 15 MG tablet, Take 1 tablet by mouth 3 (Three) Times a Day., Disp: , Rfl:     clonazePAM (KlonoPIN) 0.5 MG tablet, Take 1 tablet by mouth Every 12 (Twelve) Hours., Disp: , Rfl:     diazePAM (VALIUM) 5 MG tablet, Take 1 tablet by mouth Every 8 (Eight) Hours As Needed. for anxiety, Disp: , Rfl:     folic acid (FOLVITE) 1 MG tablet, , Disp: , Rfl:     hydrOXYzine (ATARAX) 25 MG tablet, Take 1 tablet by mouth 3 (Three) Times a Day As Needed., Disp: , Rfl:     naltrexone (DEPADE) 50 MG tablet, Take 1 tablet by mouth Daily., Disp: , Rfl:     pantoprazole (PROTONIX) 40 MG EC tablet, Take 1 tablet by mouth Daily., Disp: , Rfl:     PARoxetine (PAXIL) 40 MG tablet, Take 1 tablet by mouth Daily., Disp: , Rfl:     prazosin (MINIPRESS) 2 MG capsule, Take 1 capsule by mouth Every Night., Disp: , Rfl:     QUEtiapine (SEROquel) 100 MG tablet, Take 1 tablet by mouth every night at bedtime., Disp: , Rfl:     sertraline (ZOLOFT) 100 MG tablet, Take 2 tablets by mouth Daily., Disp: , Rfl:     SM Nicotine 14 MG/24HR patch, Place 1 patch on the skin as directed by provider Daily., Disp: , Rfl:     traZODone (DESYREL) 100 MG tablet, Take 1 tablet by mouth Daily., Disp: , Rfl:     nicotine (Nicoderm CQ) 21 MG/24HR patch, Place 1 patch on the skin as directed by provider Daily., Disp: 28 patch, Rfl: 5    nicotine polacrilex (Nicorette) 4 MG gum, Chew 1 each As Needed for Smoking Cessation., Disp: 100 each, Rfl: 5    Review of Systems   Constitutional: Negative for chills and fever.   Cardiovascular:  Positive for chest pain. Negative for paroxysmal nocturnal dyspnea.   Respiratory:  Negative for cough and shortness of breath.    Skin:  Negative for rash.   Gastrointestinal:  Negative for abdominal pain and heartburn.   Neurological:  Positive for dizziness. Negative for numbness.       Objective   BP 90/60 (BP Location: Left arm, Patient Position:  "Sitting, Cuff Size: Adult)   Pulse 92   Ht 180.3 cm (70.98\")   Wt 86.6 kg (191 lb)   SpO2 98%   BMI 26.65 kg/m²   Constitutional:       Appearance: Well-developed and overweight.   HENT:      Head: Normocephalic and atraumatic.   Pulmonary:      Effort: Pulmonary effort is normal.      Breath sounds: Normal breath sounds.   Cardiovascular:      Normal rate. Regular rhythm.      Murmurs: There is no murmur.      No gallop.  No click.   Edema:     Peripheral edema absent.   Skin:     General: Skin is warm and dry.   Neurological:      Mental Status: Alert and oriented to person, place, and time.         ECG 12 Lead    Date/Time: 12/22/2023 11:40 AM  Performed by: Nikolas Ramos MD    Authorized by: Nikolas Ramos MD  Comparison: compared with previous ECG from 12/6/2023  Comparison to previous ECG: Inferior q waves are now present  Rhythm: sinus rhythm  Rate: normal  Q waves: III and aVF              ICD-10-CM ICD-9-CM   1. Aneurysm of the ascending aorta, without rupture  I71.21 441.2   2. Chest pain, unspecified type  R07.9 786.50   3. Essential hypertension  I10 401.9   4. Dyslipidemia  E78.5 272.4   5. Tobacco use  Z72.0 305.1         Assessment/Plan:  1.  Ascending aortic aneurysm: Reported as 5.4 cm on CT chest from 11/6/2023.  Planning on surgical repair in the near future with Dr. Duran.  Echo has been ordered and is pending.  Check a heart catheterization prior to surgery.    2.  Chest pain: Somewhat atypical features.  Coronary angiography planned prior to heart catheterization to evaluate for any significant coronary artery disease.  Essential hypertension    3.  Essential hypertension: Managed on prazosin.  Blood pressure is in the low normal range today.  May need to discontinue this in the future if blood pressure remains low.    4.  Dyslipidemia: Continue atorvastatin and check a lipid panel prior to heart cath.    5.  Tobacco use: Nic Castro  reports that he has been smoking cigarettes. He " started smoking about 20 years ago. He has been smoking an average of .5 packs per day. He has never used smokeless tobacco.. I have educated him on the risk of diseases from using tobacco products such as cancer, COPD, and heart disease.  I advised him to quit and he is willing to quit. We have discussed the following method/s for tobacco cessation:  Counseling Prescription Medicaiton.  A prescription was sent in for nicotine patches and gum.  Together we have set a quit date for 1 week from today.  He will follow up with me in 1 week or sooner to check on his progress.  I spent 4.5 minutes counseling the patient.

## 2023-12-26 ENCOUNTER — TELEPHONE (OUTPATIENT)
Dept: CARDIAC SURGERY | Facility: CLINIC | Age: 48
End: 2023-12-26

## 2023-12-26 NOTE — TELEPHONE ENCOUNTER
Please notify patient just to keep us updated regarding dental clearance.  It looks like heart cath is scheduled on 1/29 so we will be looking at a surgery date sometime after that.

## 2023-12-26 NOTE — TELEPHONE ENCOUNTER
Caller: Lubna Castro    Relationship: Emergency Contact    Best call back number:     [phone] (Mobile)       What is the best time to reach you: ANY    Who are you requesting to speak with (clinical staff, provider,  specific staff member): ANY    Do you know the name of the person who called: N/A    What was the call regarding: PT'S WIFE CALLED TO LET US KNOW THEY'RE HAVING TROUBLE GETTING INTO THE DENTAL CLINIC TO GET THE CLEARANCE BECAUSE THEY'RE CLOSED UNTIL 1/3/24. PT WAS INSTRUCTED TO GET DENTAL CLEARANCE PRIOR TO SURGERY WITH DR LESTER. PT DOES NOT HAVE DENTAL INSURANCE SO THEY ARE LOOKING FOR AN OPTION TO OBTAIN DENTAL CLEARANCE ASAP. PLEASE ADVISE    Is it okay if the provider responds through [employer]: NO - PLEASE CALL. OK TO LEAVE VM.

## 2023-12-29 ENCOUNTER — HOSPITAL ENCOUNTER (OUTPATIENT)
Facility: HOSPITAL | Age: 48
Setting detail: HOSPITAL OUTPATIENT SURGERY
Discharge: HOME | End: 2023-12-29
Attending: INTERNAL MEDICINE | Admitting: INTERNAL MEDICINE
Payer: COMMERCIAL

## 2023-12-29 VITALS
OXYGEN SATURATION: 94 % | RESPIRATION RATE: 16 BRPM | HEIGHT: 71 IN | DIASTOLIC BLOOD PRESSURE: 77 MMHG | HEART RATE: 76 BPM | WEIGHT: 189.2 LBS | TEMPERATURE: 97.5 F | SYSTOLIC BLOOD PRESSURE: 100 MMHG | BODY MASS INDEX: 26.49 KG/M2

## 2023-12-29 DIAGNOSIS — R07.9 CHEST PAIN, UNSPECIFIED TYPE: ICD-10-CM

## 2023-12-29 DIAGNOSIS — I71.21 ANEURYSM OF THE ASCENDING AORTA, WITHOUT RUPTURE: ICD-10-CM

## 2023-12-29 LAB
ALBUMIN SERPL-MCNC: 4.3 G/DL (ref 3.5–5.2)
ALBUMIN/GLOB SERPL: 1.3 G/DL
ALP SERPL-CCNC: 83 U/L (ref 39–117)
ALT SERPL W P-5'-P-CCNC: 56 U/L (ref 1–41)
ANION GAP SERPL CALCULATED.3IONS-SCNC: 11 MMOL/L (ref 5–15)
AST SERPL-CCNC: 43 U/L (ref 1–40)
BASOPHILS # BLD AUTO: 0.08 10*3/MM3 (ref 0–0.2)
BASOPHILS NFR BLD AUTO: 0.8 % (ref 0–1.5)
BILIRUB SERPL-MCNC: 0.7 MG/DL (ref 0–1.2)
BUN SERPL-MCNC: 11 MG/DL (ref 6–20)
BUN/CREAT SERPL: 13.3 (ref 7–25)
CALCIUM SPEC-SCNC: 9.6 MG/DL (ref 8.6–10.5)
CHLORIDE SERPL-SCNC: 104 MMOL/L (ref 98–107)
CO2 SERPL-SCNC: 26 MMOL/L (ref 22–29)
CREAT SERPL-MCNC: 0.83 MG/DL (ref 0.76–1.27)
DEPRECATED RDW RBC AUTO: 39.7 FL (ref 37–54)
EGFRCR SERPLBLD CKD-EPI 2021: 106.6 ML/MIN/1.73
EOSINOPHIL # BLD AUTO: 0.34 10*3/MM3 (ref 0–0.4)
EOSINOPHIL NFR BLD AUTO: 3.2 % (ref 0.3–6.2)
ERYTHROCYTE [DISTWIDTH] IN BLOOD BY AUTOMATED COUNT: 13.2 % (ref 12.3–15.4)
GLOBULIN UR ELPH-MCNC: 3.2 GM/DL
GLUCOSE SERPL-MCNC: 98 MG/DL (ref 65–99)
HCT VFR BLD AUTO: 44 % (ref 37.5–51)
HGB BLD-MCNC: 14.6 G/DL (ref 13–17.7)
IMM GRANULOCYTES # BLD AUTO: 0.03 10*3/MM3 (ref 0–0.05)
IMM GRANULOCYTES NFR BLD AUTO: 0.3 % (ref 0–0.5)
LYMPHOCYTES # BLD AUTO: 1.97 10*3/MM3 (ref 0.7–3.1)
LYMPHOCYTES NFR BLD AUTO: 18.7 % (ref 19.6–45.3)
MCH RBC QN AUTO: 27.4 PG (ref 26.6–33)
MCHC RBC AUTO-ENTMCNC: 33.2 G/DL (ref 31.5–35.7)
MCV RBC AUTO: 82.7 FL (ref 79–97)
MONOCYTES # BLD AUTO: 1.08 10*3/MM3 (ref 0.1–0.9)
MONOCYTES NFR BLD AUTO: 10.2 % (ref 5–12)
NEUTROPHILS NFR BLD AUTO: 66.8 % (ref 42.7–76)
NEUTROPHILS NFR BLD AUTO: 7.06 10*3/MM3 (ref 1.7–7)
NRBC BLD AUTO-RTO: 0 /100 WBC (ref 0–0.2)
PLATELET # BLD AUTO: 223 10*3/MM3 (ref 140–450)
PMV BLD AUTO: 9.4 FL (ref 6–12)
POTASSIUM SERPL-SCNC: 4.3 MMOL/L (ref 3.5–5.2)
PROT SERPL-MCNC: 7.5 G/DL (ref 6–8.5)
RBC # BLD AUTO: 5.32 10*6/MM3 (ref 4.14–5.8)
SODIUM SERPL-SCNC: 141 MMOL/L (ref 136–145)
WBC NRBC COR # BLD AUTO: 10.56 10*3/MM3 (ref 3.4–10.8)

## 2023-12-29 PROCEDURE — 25010000002 HEPARIN (PORCINE) 1000-0.9 UT/500ML-% SOLUTION: Performed by: INTERNAL MEDICINE

## 2023-12-29 PROCEDURE — C1894 INTRO/SHEATH, NON-LASER: HCPCS | Performed by: INTERNAL MEDICINE

## 2023-12-29 PROCEDURE — 85025 COMPLETE CBC W/AUTO DIFF WBC: CPT | Performed by: INTERNAL MEDICINE

## 2023-12-29 PROCEDURE — 80053 COMPREHEN METABOLIC PANEL: CPT | Performed by: INTERNAL MEDICINE

## 2023-12-29 PROCEDURE — C1769 GUIDE WIRE: HCPCS | Performed by: INTERNAL MEDICINE

## 2023-12-29 PROCEDURE — 93458 L HRT ARTERY/VENTRICLE ANGIO: CPT | Performed by: INTERNAL MEDICINE

## 2023-12-29 PROCEDURE — 99153 MOD SED SAME PHYS/QHP EA: CPT | Performed by: INTERNAL MEDICINE

## 2023-12-29 PROCEDURE — 25010000002 DIPHENHYDRAMINE PER 50 MG: Performed by: INTERNAL MEDICINE

## 2023-12-29 PROCEDURE — 25010000002 FENTANYL CITRATE (PF) 50 MCG/ML SOLUTION: Performed by: INTERNAL MEDICINE

## 2023-12-29 PROCEDURE — 25510000001 IOPAMIDOL PER 1 ML: Performed by: INTERNAL MEDICINE

## 2023-12-29 PROCEDURE — 25010000002 HEPARIN (PORCINE) 2000-0.9 UNIT/L-% SOLUTION: Performed by: INTERNAL MEDICINE

## 2023-12-29 PROCEDURE — 25010000002 HEPARIN (PORCINE) PER 1000 UNITS: Performed by: INTERNAL MEDICINE

## 2023-12-29 PROCEDURE — 25010000002 MIDAZOLAM PER 1 MG: Performed by: INTERNAL MEDICINE

## 2023-12-29 PROCEDURE — 99152 MOD SED SAME PHYS/QHP 5/>YRS: CPT | Performed by: INTERNAL MEDICINE

## 2023-12-29 RX ORDER — SODIUM CHLORIDE 0.9 % (FLUSH) 0.9 %
10 SYRINGE (ML) INJECTION AS NEEDED
Status: DISCONTINUED | OUTPATIENT
Start: 2023-12-29 | End: 2023-12-29 | Stop reason: HOSPADM

## 2023-12-29 RX ORDER — HEPARIN SODIUM 1000 [USP'U]/ML
INJECTION, SOLUTION INTRAVENOUS; SUBCUTANEOUS
Status: DISCONTINUED | OUTPATIENT
Start: 2023-12-29 | End: 2023-12-29 | Stop reason: HOSPADM

## 2023-12-29 RX ORDER — NITROGLYCERIN 0.4 MG/1
0.4 TABLET SUBLINGUAL
Status: CANCELLED | OUTPATIENT
Start: 2023-12-29

## 2023-12-29 RX ORDER — VERAPAMIL HYDROCHLORIDE 2.5 MG/ML
INJECTION, SOLUTION INTRAVENOUS
Status: DISCONTINUED | OUTPATIENT
Start: 2023-12-29 | End: 2023-12-29 | Stop reason: HOSPADM

## 2023-12-29 RX ORDER — LIDOCAINE HYDROCHLORIDE 20 MG/ML
INJECTION, SOLUTION INFILTRATION; PERINEURAL
Status: DISCONTINUED | OUTPATIENT
Start: 2023-12-29 | End: 2023-12-29 | Stop reason: HOSPADM

## 2023-12-29 RX ORDER — ACETAMINOPHEN 325 MG/1
650 TABLET ORAL EVERY 4 HOURS PRN
Status: CANCELLED | OUTPATIENT
Start: 2023-12-29

## 2023-12-29 RX ORDER — HEPARIN SODIUM 200 [USP'U]/100ML
INJECTION, SOLUTION INTRAVENOUS
Status: DISCONTINUED | OUTPATIENT
Start: 2023-12-29 | End: 2023-12-29 | Stop reason: HOSPADM

## 2023-12-29 RX ORDER — FENTANYL CITRATE 50 UG/ML
INJECTION, SOLUTION INTRAMUSCULAR; INTRAVENOUS
Status: DISCONTINUED | OUTPATIENT
Start: 2023-12-29 | End: 2023-12-29 | Stop reason: HOSPADM

## 2023-12-29 RX ORDER — MIDAZOLAM HYDROCHLORIDE 1 MG/ML
INJECTION INTRAMUSCULAR; INTRAVENOUS
Status: DISCONTINUED | OUTPATIENT
Start: 2023-12-29 | End: 2023-12-29 | Stop reason: HOSPADM

## 2023-12-29 RX ORDER — SODIUM CHLORIDE 0.9 % (FLUSH) 0.9 %
10 SYRINGE (ML) INJECTION EVERY 12 HOURS SCHEDULED
Status: DISCONTINUED | OUTPATIENT
Start: 2023-12-29 | End: 2023-12-29 | Stop reason: HOSPADM

## 2023-12-29 RX ORDER — SODIUM CHLORIDE 9 MG/ML
40 INJECTION, SOLUTION INTRAVENOUS AS NEEDED
Status: DISCONTINUED | OUTPATIENT
Start: 2023-12-29 | End: 2023-12-29 | Stop reason: HOSPADM

## 2023-12-29 RX ORDER — DIPHENHYDRAMINE HYDROCHLORIDE 50 MG/ML
INJECTION INTRAMUSCULAR; INTRAVENOUS
Status: DISCONTINUED | OUTPATIENT
Start: 2023-12-29 | End: 2023-12-29 | Stop reason: HOSPADM

## 2023-12-29 NOTE — Clinical Note
Hemostasis started on the right radial artery. R-Band was used in achieving hemostasis. Radial compression device applied to vessel. Hemostasis achieved successfully. Closure device additional comment: 13 mls

## 2024-01-04 ENCOUNTER — HOSPITAL ENCOUNTER (OUTPATIENT)
Dept: CT IMAGING | Facility: HOSPITAL | Age: 49
Discharge: HOME | End: 2024-01-04
Payer: COMMERCIAL

## 2024-01-04 ENCOUNTER — HOSPITAL ENCOUNTER (OUTPATIENT)
Dept: CARDIOLOGY | Facility: HOSPITAL | Age: 49
Discharge: HOME | End: 2024-01-04
Payer: COMMERCIAL

## 2024-01-04 ENCOUNTER — TELEPHONE (OUTPATIENT)
Dept: CARDIAC SURGERY | Facility: CLINIC | Age: 49
End: 2024-01-04
Payer: COMMERCIAL

## 2024-01-04 ENCOUNTER — HOSPITAL ENCOUNTER (OUTPATIENT)
Dept: ULTRASOUND IMAGING | Facility: HOSPITAL | Age: 49
Discharge: HOME | End: 2024-01-04
Payer: COMMERCIAL

## 2024-01-04 VITALS
BODY MASS INDEX: 26.46 KG/M2 | WEIGHT: 189 LBS | DIASTOLIC BLOOD PRESSURE: 80 MMHG | SYSTOLIC BLOOD PRESSURE: 128 MMHG | HEIGHT: 71 IN

## 2024-01-04 DIAGNOSIS — I71.21 ANEURYSM OF THE ASCENDING AORTA, WITHOUT RUPTURE: ICD-10-CM

## 2024-01-04 LAB
BH CV ECHO MEAS - AO MAX PG: 6.9 MMHG
BH CV ECHO MEAS - AO MEAN PG: 4 MMHG
BH CV ECHO MEAS - AO ROOT DIAM: 5.6 CM
BH CV ECHO MEAS - AO V2 MAX: 131 CM/SEC
BH CV ECHO MEAS - AO V2 VTI: 29.9 CM
BH CV ECHO MEAS - AVA(I,D): 3 CM2
BH CV ECHO MEAS - EDV(CUBED): 148.9 ML
BH CV ECHO MEAS - EDV(MOD-SP2): 129 ML
BH CV ECHO MEAS - EDV(MOD-SP4): 111 ML
BH CV ECHO MEAS - EF(MOD-BP): 57.4 %
BH CV ECHO MEAS - EF(MOD-SP2): 56.4 %
BH CV ECHO MEAS - EF(MOD-SP4): 56.7 %
BH CV ECHO MEAS - ESV(CUBED): 27 ML
BH CV ECHO MEAS - ESV(MOD-SP2): 56.3 ML
BH CV ECHO MEAS - ESV(MOD-SP4): 48.1 ML
BH CV ECHO MEAS - FS: 43.4 %
BH CV ECHO MEAS - IVS/LVPW: 1.11 CM
BH CV ECHO MEAS - IVSD: 1 CM
BH CV ECHO MEAS - LA DIMENSION: 1.9 CM
BH CV ECHO MEAS - LAT PEAK E' VEL: 9.6 CM/SEC
BH CV ECHO MEAS - LV DIASTOLIC VOL/BSA (35-75): 53.9 CM2
BH CV ECHO MEAS - LV MASS(C)D: 187.3 GRAMS
BH CV ECHO MEAS - LV MAX PG: 2.01 MMHG
BH CV ECHO MEAS - LV MEAN PG: 1 MMHG
BH CV ECHO MEAS - LV SYSTOLIC VOL/BSA (12-30): 23.4 CM2
BH CV ECHO MEAS - LV V1 MAX: 70.9 CM/SEC
BH CV ECHO MEAS - LV V1 VTI: 15.8 CM
BH CV ECHO MEAS - LVIDD: 5.3 CM
BH CV ECHO MEAS - LVIDS: 3 CM
BH CV ECHO MEAS - LVOT AREA: 5.7 CM2
BH CV ECHO MEAS - LVOT DIAM: 2.7 CM
BH CV ECHO MEAS - LVPWD: 0.9 CM
BH CV ECHO MEAS - MED PEAK E' VEL: 7.4 CM/SEC
BH CV ECHO MEAS - MV A MAX VEL: 34.9 CM/SEC
BH CV ECHO MEAS - MV DEC TIME: 0.3 SEC
BH CV ECHO MEAS - MV E MAX VEL: 53.4 CM/SEC
BH CV ECHO MEAS - MV E/A: 1.53
BH CV ECHO MEAS - SI(MOD-SP2): 35.3 ML/M2
BH CV ECHO MEAS - SI(MOD-SP4): 30.6 ML/M2
BH CV ECHO MEAS - SV(LVOT): 90.5 ML
BH CV ECHO MEAS - SV(MOD-SP2): 72.7 ML
BH CV ECHO MEAS - SV(MOD-SP4): 62.9 ML
BH CV ECHO MEAS - TR MAX PG: 14.4 MMHG
BH CV ECHO MEAS - TR MAX VEL: 190 CM/SEC
BH CV ECHO MEASUREMENTS AVERAGE E/E' RATIO: 6.28
LEFT ATRIUM VOLUME INDEX: 16.9 ML/M2
LEFT ATRIUM VOLUME: 34.9 ML

## 2024-01-04 PROCEDURE — 93306 TTE W/DOPPLER COMPLETE: CPT

## 2024-01-04 PROCEDURE — 71250 CT THORAX DX C-: CPT

## 2024-01-04 PROCEDURE — 93880 EXTRACRANIAL BILAT STUDY: CPT

## 2024-01-04 NOTE — TELEPHONE ENCOUNTER
Called and spoke with patient's wife to check on dental clearance.  She states that she has not been able to get him in to be evaluated yet due to the holidays.  He does not have dental insurance therefore his evaluation will be out-of-pocket.  She does plan to try to get him seen at a clinic in [place] tomorrow and will call me and update me.  He has had heart cath.  As soon as dental clearance is obtained we will schedule surgery.  Tentatively planning surgery for 2/7/2024 however if patient obtains dental clearance, will try to get surgery done in January.  She verbalizes understanding and is agreeable.

## 2024-01-11 ENCOUNTER — TELEPHONE (OUTPATIENT)
Dept: CARDIAC SURGERY | Facility: CLINIC | Age: 49
End: 2024-01-11
Payer: COMMERCIAL

## 2024-01-11 NOTE — TELEPHONE ENCOUNTER
Caller: Lubna Castro    Relationship: Emergency Contact    Best call back number: [phone]     What is the best time to reach you: ANY    Who are you requesting to speak with (clinical staff, provider,  specific staff member): CLINICAL    Do you know the name of the person who called: PT SPOUSE    What was the call regarding: PT JUST LEFT THE DENTIST ON 1.11.24. PT SPOUSE CALLED HUB RELAYING WHAT THE DENTIST TOLD THE PATIENT. THE PATIENT HAS SEVEN TOOTH ABSCESS, PATIENT WOULD LIKE TO KNOW WHAT TO DO MOVING FORWARD. PT AWARE THAT THIS MAY PLACE A DELAY ON SURGERY. PT WOULD NEED TO KNOW WHICH MEDICATION HE SHOULD STOP TAKING PRIOR TO GOING TO THE DENTIST.     Is it okay if the provider responds through MyChart: PHONE CALL PREFERRED.

## 2024-01-16 NOTE — TELEPHONE ENCOUNTER
He will need to have abscess treated by his dentist and we need a letter from dentist stating he is free of infection prior to surgery.  Any medications that need to be held will be up to his dentist.  Please have patient update me when he knows timing of dental work.  Please notify patient

## 2024-01-22 ENCOUNTER — TELEPHONE (OUTPATIENT)
Dept: CARDIAC SURGERY | Facility: CLINIC | Age: 49
End: 2024-01-22
Payer: COMMERCIAL

## 2024-01-22 NOTE — TELEPHONE ENCOUNTER
Called and spoke with wife and pt is working on this.  States his insurance has presented some issues.  Pt does have an appt to see someone re: this tomorrow.  Advised her to keep us posted on pt's progress with this.  She voiced understanding/surendra

## 2024-01-22 NOTE — TELEPHONE ENCOUNTER
Please check with patient to find out what dental plan is.  He called week before last to report he has multiple teeth abscess.  Just checking on if there is a plan for any extractions or if he is being treated.

## 2024-01-23 ENCOUNTER — TELEPHONE (OUTPATIENT)
Dept: CARDIAC SURGERY | Facility: CLINIC | Age: 49
End: 2024-01-23

## 2024-01-23 NOTE — TELEPHONE ENCOUNTER
Called and spoke with patient's wife.  She states that he has been evaluated by a dentist at [facility] in [place] and has been told that given his aneurysm they will not extract his teeth in an office setting and he has been referred to someone in [place] or [place] for tooth extraction.  Patient's wife states she is trying to get a hold of other dentist who may be able to see him and states that they will travel out of town if needed however this is a hardship.  I have asked her to update me with plan moving forward when teeth extraction has been arranged.  She also reports that patient told her today that he has had some dull chest pain that does not radiate.  I have advised her that if patient continues to have chest pain he should present to the emergency room.  She verbalizes understanding.

## 2024-01-23 NOTE — TELEPHONE ENCOUNTER
Caller: Matthew,Lubna    Relationship: Emergency Contact    Best call back number:     [phone]       What is the best time to reach you: ANY    Who are you requesting to speak with (clinical staff, provider,  specific staff member): CLINICAL     Do you know the name of the person who called: PT SPOUSE     What was the call regarding: PT SPOUSE CALLED HUB STATING THAT THE PATIENT WOULD HAVE TO BE IN AN HOSPITAL SETTING IN ORDER TO GET 4 TOOTH REMOVED. PT SPOUSE WOULD LIKE TO RECEIVE A CALL BACK IN REGARDS TO DENTAL CLEARANCE.     Is it okay if the provider responds through MyChart: PHONE CALL PREFERRED.

## 2024-01-24 NOTE — TELEPHONE ENCOUNTER
Discussed further with Dr. Duran.  Called patient's wife and advised her to contact Dr. Hernandez regarding dental extraction and see if they would be able to help him with this to prevent them from having to travel to [place] or [place].  She verbalized understanding and states she will call.  She tells me that the patient is currently in the ER at [facility] as he was having ongoing chest discomfort.  I have been in contact with the patient's nurse at [facility] and provided my contact information should the ER physician need to speak with myself or Dr. Duran.  Patient is currently getting CTA of the chest now.

## 2024-01-25 NOTE — TELEPHONE ENCOUNTER
Spoke with patient's wife.  She states that CTA of the chest in ER yesterday at [facility] did not reveal an aortic dissection.  He was told that he has chronic pancreatitis due to history of alcohol use.  She reports she did call Dr. Hernandez and they are not able to see him due to his insurance.  She has reached out to [facility] to schedule dental appointment there and they advised her they are waiting on referral.  I have asked her to let me know when dental extractions are arranged and we can discuss potential surgery dates.  She verbalized understanding.

## 2024-02-02 NOTE — TELEPHONE ENCOUNTER
Caller: Mayco Castroelle    Relationship: Emergency Contact    Best call back number: [phone]    What is the best time to reach you: ANY    Who are you requesting to speak with (clinical staff, provider,  specific staff member): CLINICAL    Do you know the name of the person who called: PTS WIFE LATA    What was the call regarding: PTS WIFE STATES HE IS ON A WAIT LIST TO SEE THE DENTIST. HE IS SCHEDULED FOR MAY AND THAT'S THE SOONEST HE CAN GET IN AS OF RIGHT NOW. IF YOU HAVE ANY QUESTIONS GIVE HER A CALL. THANK YOU    Is it okay if the provider responds through [facility]: NO

## 2024-02-06 DIAGNOSIS — I71.21 ANEURYSM OF THE ASCENDING AORTA, WITHOUT RUPTURE: ICD-10-CM

## 2024-02-06 DIAGNOSIS — K04.7 PERIAPICAL ABSCESS: Primary | ICD-10-CM

## 2024-02-06 NOTE — TELEPHONE ENCOUNTER
I called Dr. Hernandez's office to see if they would be agreeable to see patient.  I spoke with Nathalie who requested urgent referral and supporting documentation be emailed to [email or web address].  Please send referral ASAP and show he states she will call tomorrow after she is able to discuss with Dr. Nasrin Hernandez.  Will await their call back.  Patient notified as well.

## 2024-02-08 NOTE — TELEPHONE ENCOUNTER
Natahlie calling this morning with update on referral.  She states she was out of the office sick yesterday and was able to discuss with Dr. Hernandez this morning.  She states he is reviewing his notes and they are going to try to get something arranged.  She states she will call me back when she has information.

## 2024-02-19 NOTE — TELEPHONE ENCOUNTER
Spoke with Ivonne.  She states they are going to get patient in this Wednesday, 2/21/2024 for office consult and x-rays.  Discussed with Dr. Duran and he is comfortable with patient having teeth extracted either in the office or in the hospital which ever Dr. Hernandez prefers.

## 2024-02-19 NOTE — TELEPHONE ENCOUNTER
Ivonne returned call to office and she states she has confirmed with pt his appointment for consult and x-ray for Wednesday 02/21/2024 at 10:45 and she has given Dr. Nasrin Hernandez the message okay to have dental surgery in office or in hospital setting.

## 2024-02-20 NOTE — TELEPHONE ENCOUNTER
Nathalie Hernandez's office called to report that pt's wife called and canceled appointment on 02/21 for XR and consult. Pt's wife states they do not want to r/s at this time and will call to r/s on a later date. Dr. Hernandez is still willing to see pt. All of this free of charge.

## 2024-02-22 NOTE — TELEPHONE ENCOUNTER
"Patient called back and spoke with Selene in the HUB. Patient advises that Dr Hernandez's office told him he had to \"pay $100 up front\" to be seen. Selene states she advised pt that according to this TE, everything was going to free of charge. Patient states this is not true and is why he canceled his appt. Pt advised that Mehnaz and Dr Duran are gone for the day and that Mehnaz would return his call tomorrow.   " What Is The Reason For Today's Visit?: Full Body Skin Examination with No Concerns What Is The Reason For Today's Visit? (Being Monitored For X): the development of new lesions

## 2024-02-23 NOTE — TELEPHONE ENCOUNTER
Problem: Respiratory Impairment - Respiratory Therapy 253  Intervention: Respiratory support devices  Note: Intervention Status  Done      Please advise patient to notify me when appointment is rescheduled

## 2024-03-27 ENCOUNTER — PREP FOR SURGERY (OUTPATIENT)
Dept: OTHER | Facility: HOSPITAL | Age: 49
End: 2024-03-27
Payer: COMMERCIAL

## 2024-03-27 ENCOUNTER — TELEPHONE (OUTPATIENT)
Dept: CARDIAC SURGERY | Facility: CLINIC | Age: 49
End: 2024-03-27
Payer: COMMERCIAL

## 2024-03-27 DIAGNOSIS — I71.21 ANEURYSM OF THE ASCENDING AORTA, WITHOUT RUPTURE: Primary | ICD-10-CM

## 2024-03-27 RX ORDER — SODIUM CHLORIDE 9 MG/ML
40 INJECTION, SOLUTION INTRAVENOUS AS NEEDED
OUTPATIENT
Start: 2024-03-27

## 2024-03-27 RX ORDER — IBUPROFEN 600 MG/1
1 TABLET ORAL
OUTPATIENT
Start: 2024-03-27

## 2024-03-27 RX ORDER — ACETAMINOPHEN 500 MG
1000 TABLET ORAL ONCE
OUTPATIENT
Start: 2024-04-17

## 2024-03-27 RX ORDER — SODIUM CHLORIDE 0.9 % (FLUSH) 0.9 %
10 SYRINGE (ML) INJECTION EVERY 12 HOURS SCHEDULED
OUTPATIENT
Start: 2024-03-27

## 2024-03-27 RX ORDER — SODIUM CHLORIDE 0.9 % (FLUSH) 0.9 %
30 SYRINGE (ML) INJECTION ONCE AS NEEDED
OUTPATIENT
Start: 2024-03-27

## 2024-03-27 RX ORDER — DEXTROSE MONOHYDRATE 25 G/50ML
10-50 INJECTION, SOLUTION INTRAVENOUS
OUTPATIENT
Start: 2024-03-27

## 2024-03-27 RX ORDER — SODIUM CHLORIDE 9 MG/ML
30 INJECTION, SOLUTION INTRAVENOUS CONTINUOUS PRN
OUTPATIENT
Start: 2024-03-27

## 2024-03-27 RX ORDER — NICOTINE POLACRILEX 4 MG
15 LOZENGE BUCCAL
OUTPATIENT
Start: 2024-03-27

## 2024-03-27 RX ORDER — SODIUM CHLORIDE 0.9 % (FLUSH) 0.9 %
10 SYRINGE (ML) INJECTION AS NEEDED
OUTPATIENT
Start: 2024-03-27

## 2024-03-27 NOTE — TELEPHONE ENCOUNTER
Patient's wife (Lubna) aware of prework date/time and OR date/arrival time 0500/npo/no meds. Aware to  Bactroban for use on 4/16 @ 1700 - instructions given. Confirmed that patient does not currently take any anticoagulation. Provided our fax # for dental clearance.     Pt's wife states that they changed pharmacies to [facility]. I have updated the chart to reflect this and advised I would let Mehnaz know so she could resend Bactroban.

## 2024-03-27 NOTE — TELEPHONE ENCOUNTER
Surgery orders are in for 4/17/2024.  Please call patient with prework information.  Please confirm he does not take anticoagulation or Plavix.  He is scheduled to have dental extraction on 4/11/2024 and we will need dental clearance letter after he has his teeth pulled and prior to surgery

## 2024-04-08 ENCOUNTER — TELEPHONE (OUTPATIENT)
Dept: CARDIAC SURGERY | Facility: CLINIC | Age: 49
End: 2024-04-08

## 2024-04-09 ENCOUNTER — TELEPHONE (OUTPATIENT)
Dept: CARDIAC SURGERY | Facility: CLINIC | Age: 49
End: 2024-04-09
Payer: MEDICAID

## 2024-04-09 NOTE — TELEPHONE ENCOUNTER
Spoke with patient's wife.  Patient does want to proceed with mechanical valve with the understanding that he will take Coumadin for life.  He verbalizes understanding to this.

## 2024-04-09 NOTE — TELEPHONE ENCOUNTER
Spoke with patient's wife.  They state they are planning to proceed with teeth extraction on 4/11/2024.  She knows that we will need a letter from the oral surgeon clearing him for surgery on 4/17/2024

## 2024-04-11 ENCOUNTER — TELEPHONE (OUTPATIENT)
Dept: CARDIAC SURGERY | Facility: CLINIC | Age: 49
End: 2024-04-11

## 2024-04-15 ENCOUNTER — HOSPITAL ENCOUNTER (OUTPATIENT)
Dept: GENERAL RADIOLOGY | Facility: HOSPITAL | Age: 49
Discharge: HOME | End: 2024-04-15
Payer: MEDICAID

## 2024-04-15 ENCOUNTER — PRE-ADMISSION TESTING (OUTPATIENT)
Dept: PREADMISSION TESTING | Facility: HOSPITAL | Age: 49
DRG: 221 | End: 2024-04-15
Payer: MEDICAID

## 2024-04-15 ENCOUNTER — ANESTHESIA EVENT (OUTPATIENT)
Dept: PERIOP | Facility: HOSPITAL | Age: 49
End: 2024-04-15
Payer: MEDICAID

## 2024-04-15 ENCOUNTER — HOSPITAL ENCOUNTER (OUTPATIENT)
Dept: PULMONOLOGY | Facility: HOSPITAL | Age: 49
Discharge: HOME | End: 2024-04-15
Payer: MEDICAID

## 2024-04-15 VITALS
SYSTOLIC BLOOD PRESSURE: 94 MMHG | RESPIRATION RATE: 16 BRPM | BODY MASS INDEX: 28.63 KG/M2 | WEIGHT: 199.96 LBS | DIASTOLIC BLOOD PRESSURE: 67 MMHG | HEART RATE: 96 BPM | OXYGEN SATURATION: 94 % | HEIGHT: 70 IN

## 2024-04-15 DIAGNOSIS — I71.21 ANEURYSM OF THE ASCENDING AORTA, WITHOUT RUPTURE: ICD-10-CM

## 2024-04-15 LAB
ABO GROUP BLD: NORMAL
ALBUMIN SERPL-MCNC: 4.1 G/DL (ref 3.5–5.2)
ALBUMIN/GLOB SERPL: 1.3 G/DL
ALP SERPL-CCNC: 93 U/L (ref 39–117)
ALT SERPL W P-5'-P-CCNC: 35 U/L (ref 1–41)
ANION GAP SERPL CALCULATED.3IONS-SCNC: 9 MMOL/L (ref 5–15)
APTT PPP: 26.5 SECONDS (ref 24.5–36)
ARTERIAL PATENCY WRIST A: POSITIVE
AST SERPL-CCNC: 34 U/L (ref 1–40)
ATMOSPHERIC PRESS: 748 MMHG
BACTERIA UR QL AUTO: ABNORMAL /HPF
BASE EXCESS BLDA CALC-SCNC: 3 MMOL/L (ref 0–2)
BASOPHILS # BLD AUTO: 0.08 10*3/MM3 (ref 0–0.2)
BASOPHILS NFR BLD AUTO: 1.1 % (ref 0–1.5)
BDY SITE: ABNORMAL
BILIRUB SERPL-MCNC: 0.5 MG/DL (ref 0–1.2)
BILIRUB UR QL STRIP: ABNORMAL
BLD GP AB SCN SERPL QL: NEGATIVE
BODY TEMPERATURE: 37
BUN SERPL-MCNC: 14 MG/DL (ref 6–20)
BUN/CREAT SERPL: 18.9 (ref 7–25)
CALCIUM SPEC-SCNC: 9.6 MG/DL (ref 8.6–10.5)
CHLORIDE SERPL-SCNC: 102 MMOL/L (ref 98–107)
CLARITY UR: CLEAR
CO2 SERPL-SCNC: 29 MMOL/L (ref 22–29)
COLOR UR: ABNORMAL
CREAT SERPL-MCNC: 0.74 MG/DL (ref 0.76–1.27)
DEPRECATED RDW RBC AUTO: 45.7 FL (ref 37–54)
EGFRCR SERPLBLD CKD-EPI 2021: 110.4 ML/MIN/1.73
EOSINOPHIL # BLD AUTO: 0.35 10*3/MM3 (ref 0–0.4)
EOSINOPHIL NFR BLD AUTO: 4.8 % (ref 0.3–6.2)
ERYTHROCYTE [DISTWIDTH] IN BLOOD BY AUTOMATED COUNT: 14.8 % (ref 12.3–15.4)
GLOBULIN UR ELPH-MCNC: 3.1 GM/DL
GLUCOSE SERPL-MCNC: 121 MG/DL (ref 65–99)
GLUCOSE UR STRIP-MCNC: NEGATIVE MG/DL
HCO3 BLDA-SCNC: 28 MMOL/L (ref 20–26)
HCT VFR BLD AUTO: 44.6 % (ref 37.5–51)
HGB BLD-MCNC: 14.7 G/DL (ref 13–17.7)
HGB UR QL STRIP.AUTO: NEGATIVE
HYALINE CASTS UR QL AUTO: ABNORMAL /LPF
IMM GRANULOCYTES # BLD AUTO: 0.03 10*3/MM3 (ref 0–0.05)
IMM GRANULOCYTES NFR BLD AUTO: 0.4 % (ref 0–0.5)
INR PPP: 0.98 (ref 0.91–1.09)
KETONES UR QL STRIP: ABNORMAL
LEUKOCYTE ESTERASE UR QL STRIP.AUTO: ABNORMAL
LYMPHOCYTES # BLD AUTO: 2.01 10*3/MM3 (ref 0.7–3.1)
LYMPHOCYTES NFR BLD AUTO: 27.7 % (ref 19.6–45.3)
Lab: ABNORMAL
MCH RBC QN AUTO: 28 PG (ref 26.6–33)
MCHC RBC AUTO-ENTMCNC: 33 G/DL (ref 31.5–35.7)
MCV RBC AUTO: 85 FL (ref 79–97)
MODALITY: ABNORMAL
MONOCYTES # BLD AUTO: 0.77 10*3/MM3 (ref 0.1–0.9)
MONOCYTES NFR BLD AUTO: 10.6 % (ref 5–12)
NEUTROPHILS NFR BLD AUTO: 4.02 10*3/MM3 (ref 1.7–7)
NEUTROPHILS NFR BLD AUTO: 55.4 % (ref 42.7–76)
NITRITE UR QL STRIP: NEGATIVE
NRBC BLD AUTO-RTO: 0 /100 WBC (ref 0–0.2)
PCO2 BLDA: 42.9 MM HG (ref 35–45)
PCO2 TEMP ADJ BLD: 42.9 MM HG (ref 35–45)
PH BLDA: 7.42 PH UNITS (ref 7.35–7.45)
PH UR STRIP.AUTO: 6 [PH] (ref 5–8)
PH, TEMP CORRECTED: 7.42 PH UNITS (ref 7.35–7.45)
PLATELET # BLD AUTO: 206 10*3/MM3 (ref 140–450)
PMV BLD AUTO: 10 FL (ref 6–12)
PO2 BLDA: 65.9 MM HG (ref 83–108)
PO2 TEMP ADJ BLD: 65.9 MM HG (ref 83–108)
POTASSIUM SERPL-SCNC: 3.7 MMOL/L (ref 3.5–5.2)
PROT SERPL-MCNC: 7.2 G/DL (ref 6–8.5)
PROT UR QL STRIP: NEGATIVE
PROTHROMBIN TIME: 13.4 SECONDS (ref 11.8–14.8)
RBC # BLD AUTO: 5.25 10*6/MM3 (ref 4.14–5.8)
RBC # UR STRIP: ABNORMAL /HPF
REF LAB TEST METHOD: ABNORMAL
RH BLD: NEGATIVE
SAO2 % BLDCOA: 94.7 % (ref 94–99)
SODIUM SERPL-SCNC: 140 MMOL/L (ref 136–145)
SP GR UR STRIP: 1.03 (ref 1–1.03)
SQUAMOUS #/AREA URNS HPF: ABNORMAL /HPF
T&S EXPIRATION DATE: NORMAL
UROBILINOGEN UR QL STRIP: ABNORMAL
VENTILATOR MODE: ABNORMAL
WBC # UR STRIP: ABNORMAL /HPF
WBC NRBC COR # BLD AUTO: 7.26 10*3/MM3 (ref 3.4–10.8)

## 2024-04-15 PROCEDURE — 36415 COLL VENOUS BLD VENIPUNCTURE: CPT

## 2024-04-15 PROCEDURE — 86900 BLOOD TYPING SEROLOGIC ABO: CPT

## 2024-04-15 PROCEDURE — 85610 PROTHROMBIN TIME: CPT

## 2024-04-15 PROCEDURE — 82803 BLOOD GASES ANY COMBINATION: CPT

## 2024-04-15 PROCEDURE — 85730 THROMBOPLASTIN TIME PARTIAL: CPT

## 2024-04-15 PROCEDURE — 93005 ELECTROCARDIOGRAM TRACING: CPT

## 2024-04-15 PROCEDURE — 85025 COMPLETE CBC W/AUTO DIFF WBC: CPT

## 2024-04-15 PROCEDURE — 86920 COMPATIBILITY TEST SPIN: CPT

## 2024-04-15 PROCEDURE — 86901 BLOOD TYPING SEROLOGIC RH(D): CPT

## 2024-04-15 PROCEDURE — 71046 X-RAY EXAM CHEST 2 VIEWS: CPT

## 2024-04-15 PROCEDURE — 80053 COMPREHEN METABOLIC PANEL: CPT

## 2024-04-15 PROCEDURE — 81001 URINALYSIS AUTO W/SCOPE: CPT

## 2024-04-15 PROCEDURE — 36600 WITHDRAWAL OF ARTERIAL BLOOD: CPT

## 2024-04-15 PROCEDURE — 93010 ELECTROCARDIOGRAM REPORT: CPT | Performed by: INTERNAL MEDICINE

## 2024-04-15 PROCEDURE — 86850 RBC ANTIBODY SCREEN: CPT

## 2024-04-15 RX ORDER — HYDROCODONE BITARTRATE AND ACETAMINOPHEN 5; 325 MG/1; MG/1
1 TABLET ORAL EVERY 4 HOURS PRN
Status: ON HOLD | COMMUNITY
Start: 2024-04-11 | End: 2024-04-18

## 2024-04-15 RX ORDER — AMOXICILLIN 875 MG/1
1 TABLET, COATED ORAL EVERY 12 HOURS SCHEDULED
COMMUNITY
Start: 2024-04-11 | End: 2024-04-23 | Stop reason: HOSPADM

## 2024-04-15 RX ORDER — LORATADINE 10 MG/1
1 TABLET ORAL DAILY
COMMUNITY
Start: 2024-04-02

## 2024-04-15 NOTE — DISCHARGE INSTRUCTIONS
Preparing for Surgery  Follow these instructions before the procedure:  Several days or weeks before your procedure  Medication(s) you need to stop   _______ days/week prior to surgery ***      Ask your health care provider about:  Changing or stopping your regular medicines. This is especially important if you are taking diabetes medicines or blood thinners.  Taking medicines such as aspirin and ibuprofen. These medicines can thin your blood. Do not take these medicines unless your health care provider tells you to take them.  Taking over-the-counter medicines, vitamins, herbs, and supplements.    Contact your surgeon if you:  Develop a fever of more than 100.4°F (38°C) or other feelings of illness during the 48 hours before your surgery.  Have symptoms that get worse.  Have questions or concerns about your surgery.  If you are going home the same day of your surgery you will need to arrange for a responsible adult, age 18 years old or older, to drive you home from the hospital and stay with you for 24 hours. Verification of the  will be made prior to any procedure requiring sedation. You may not go home in a taxi or any form of public transportation by yourself.     Day before your procedure  Medication(s) you need to stop the day before your surgery:Naltrexone (DEPADE)    24 hours before your procedure DO NOT drink alcoholic beverages or smoke.  24 hours before your procedure STOP taking Erectile Dysfunction medication (i.e.,Cialis, Viagra)   You may be asked to shower with a germ-killing soap.  Day of your procedure   You may take the following medication(s) the morning of surgery with a sip of water: NONE unless otherwise directed by Dr. Duran      8 hours before your procedure STOP all food, any dairy products, and full liquids. This includes hard candy, chewing gum or mints. This is extremely important to prevent serious complications.   Up to 2 hours before your scheduled arrival time, you may have  clear liquids no cream, powder, or pulp of any kind. Safe options are water, black coffee, plain tea, soda, Gatorade/Powerade, clear broth, apple juice.  2 hours before your scheduled arrival time, STOP drinking clear liquids.  You may need to take another shower with a germ-killing soap before you leave home in the morning. Do not use perfumes, colognes, or body lotions.  Wear comfortable loose-fitting clothing.  Remove all jewelry including body piercing and rings, dark colored nail polish, and make up prior to arrival at the hospital. Leave all valuables at home.   Bring your hearing aids if you rely on them.  Do not wear contact lenses. If you wear eyeglasses remember to bring a case to store them in while you are in surgery.  Do not use denture adhesives since you will be asked to remove them during your surgery.    You do not need to bring your home medications into the hospital.   Bring your sleep apnea device with you on the day of your surgery (if this applies to you).  If you wear portable oxygen, bring it with you.   If you are staying overnight, you may bring a bag of items you may need such as slippers, robe and a change of clothes for your discharge. You may want to leave these items in the car until you are ready for them since your family will take your belongings when you leave the pre-operative area.  Arrive at the hospital as scheduled by the office. You will be asked to arrive 2 hours prior to your surgery time in order to prepare for your procedure.  When you arrive at the hospital  Go to the registration desk located at the main entrance of the hospital.  After registration is completed, you will be given a beeper and a sticker sheet. Take the stickers to Outpatient Surgery and place in the tray at the end of the desk to notify the staff that you have arrived and registered.   Return to the lobby to wait. You are not always called back according to the time of arrival but rather the time your  doctor will be ready.  When your beeper lights up and vibrates proceed through the double doors, under the stairs, and a member of the Outpatient Surgery staff will escort you to your preoperative room.

## 2024-04-15 NOTE — ANESTHESIA PREPROCEDURE EVALUATION
Anesthesia Evaluation     Patient summary reviewed   no history of anesthetic complications:                Airway   Mallampati: I  No difficulty expected  Dental    (+) edentulous    Comment: Sutures on bottom gumline    Pulmonary    (+) a smoker (has cut back to 5 a day in attempt to quit) Abstained day of surgery,  Cardiovascular     (+) hypertension, valvular problems/murmurs (bicuspid aortic valve), hyperlipidemia    ROS comment: Ascending aortic aneurysm    Echo:  ·  Left ventricular systolic function is normal. Left ventricular ejection fraction appears to be 56 - 60%.  ·  A bicuspid aortic valve is present with no hemodynamically significant stenosis or regurgitation.  ·  Severe dilation of the aortic root is present. Severe dilation of the sinuses of Valsalva is present.      Heart cath:   Impression:  1.  Mild nonobstructive disease only.  2.  Normal left heart filling pressures.      Neuro/Psych  (+) seizures (several years ago, patient reports he felt it to be alcohol related)  GI/Hepatic/Renal/Endo    (+) GERD, hepatitis C, liver disease    Musculoskeletal     Abdominal    Substance History   (+) alcohol use (none since october, reports was prior alcoholic)     OB/GYN          Other                    Anesthesia Plan    ASA 4     general, [place], CVL and PAC     intravenous induction     Anesthetic plan, risks, benefits, and alternatives have been provided, discussed and informed consent has been obtained with: patient.    CODE STATUS:

## 2024-04-16 LAB
QT INTERVAL: 372 MS
QTC INTERVAL: 452 MS

## 2024-04-17 ENCOUNTER — APPOINTMENT (OUTPATIENT)
Dept: CARDIOLOGY | Facility: HOSPITAL | Age: 49
DRG: 221 | End: 2024-04-17
Payer: MEDICAID

## 2024-04-17 ENCOUNTER — ANESTHESIA (OUTPATIENT)
Dept: PERIOP | Facility: HOSPITAL | Age: 49
End: 2024-04-17
Payer: MEDICAID

## 2024-04-17 ENCOUNTER — APPOINTMENT (OUTPATIENT)
Dept: GENERAL RADIOLOGY | Facility: HOSPITAL | Age: 49
DRG: 221 | End: 2024-04-17
Payer: MEDICAID

## 2024-04-17 ENCOUNTER — HOSPITAL ENCOUNTER (INPATIENT)
Facility: HOSPITAL | Age: 49
LOS: 6 days | Discharge: HOME | DRG: 221 | End: 2024-04-23
Attending: SURGERY | Admitting: SURGERY
Payer: MEDICAID

## 2024-04-17 DIAGNOSIS — I71.21 ANEURYSM OF THE ASCENDING AORTA, WITHOUT RUPTURE: ICD-10-CM

## 2024-04-17 DIAGNOSIS — Z79.01 ANTICOAGULATED ON COUMADIN: ICD-10-CM

## 2024-04-17 DIAGNOSIS — Z74.09 IMPAIRED MOBILITY: Primary | ICD-10-CM

## 2024-04-17 PROBLEM — E66.3 OVERWEIGHT WITH BODY MASS INDEX (BMI) OF 28 TO 28.9 IN ADULT: Status: ACTIVE | Noted: 2024-04-17

## 2024-04-17 LAB
A-A DO2: 153.5 MMHG
ALBUMIN SERPL-MCNC: 3.5 G/DL (ref 3.5–5.2)
ALBUMIN SERPL-MCNC: 4.1 G/DL (ref 3.5–5.2)
ANION GAP SERPL CALCULATED.3IONS-SCNC: 8 MMOL/L (ref 5–15)
ANION GAP SERPL CALCULATED.3IONS-SCNC: 8 MMOL/L (ref 5–15)
APTT PPP: 42.1 SECONDS (ref 24.5–36)
ARTERIAL PATENCY WRIST A: ABNORMAL
ATMOSPHERIC PRESS: 746 MMHG
ATMOSPHERIC PRESS: 747 MMHG
ATMOSPHERIC PRESS: 749 MMHG
BASE EXCESS BLDA CALC-SCNC: -0.3 MMOL/L (ref 0–2)
BASE EXCESS BLDA CALC-SCNC: -0.5 MMOL/L (ref 0–2)
BASE EXCESS BLDA CALC-SCNC: -1.6 MMOL/L (ref 0–2)
BASE EXCESS BLDA CALC-SCNC: -1.8 MMOL/L (ref 0–2)
BASE EXCESS BLDA CALC-SCNC: 0.1 MMOL/L (ref 0–2)
BASE EXCESS BLDA CALC-SCNC: 0.4 MMOL/L (ref 0–2)
BASE EXCESS BLDA CALC-SCNC: 0.7 MMOL/L (ref 0–2)
BASE EXCESS BLDA CALC-SCNC: 1.2 MMOL/L (ref 0–2)
BASE EXCESS BLDA CALC-SCNC: 3.6 MMOL/L (ref 0–2)
BASOPHILS # BLD AUTO: 0.07 10*3/MM3 (ref 0–0.2)
BASOPHILS NFR BLD AUTO: 0.5 % (ref 0–1.5)
BDY SITE: ABNORMAL
BODY TEMPERATURE: 28
BODY TEMPERATURE: 37
BUN SERPL-MCNC: 11 MG/DL (ref 6–20)
BUN SERPL-MCNC: 11 MG/DL (ref 6–20)
BUN/CREAT SERPL: 15.5 (ref 7–25)
BUN/CREAT SERPL: 16.9 (ref 7–25)
CA-I BLD-MCNC: 4.22 MG/DL (ref 4.6–5.4)
CA-I BLD-MCNC: 4.26 MG/DL (ref 4.6–5.4)
CA-I BLD-MCNC: 4.27 MG/DL (ref 4.6–5.4)
CA-I BLD-MCNC: 4.73 MG/DL (ref 4.6–5.4)
CA-I BLD-MCNC: 4.89 MG/DL (ref 4.6–5.4)
CA-I BLD-MCNC: 4.96 MG/DL (ref 4.6–5.4)
CA-I BLD-MCNC: 5.34 MG/DL (ref 4.6–5.4)
CALCIUM SPEC-SCNC: 9.6 MG/DL (ref 8.6–10.5)
CALCIUM SPEC-SCNC: 9.7 MG/DL (ref 8.6–10.5)
CHLORIDE SERPL-SCNC: 107 MMOL/L (ref 98–107)
CHLORIDE SERPL-SCNC: 111 MMOL/L (ref 98–107)
CO2 SERPL-SCNC: 24 MMOL/L (ref 22–29)
CO2 SERPL-SCNC: 26 MMOL/L (ref 22–29)
COHGB MFR BLD: 0.9 % (ref 0–5)
COHGB MFR BLD: 1.1 % (ref 0–5)
COHGB MFR BLD: 1.2 % (ref 0–5)
COHGB MFR BLD: 1.4 % (ref 0–5)
CREAT SERPL-MCNC: 0.65 MG/DL (ref 0.76–1.27)
CREAT SERPL-MCNC: 0.71 MG/DL (ref 0.76–1.27)
D-LACTATE SERPL-SCNC: 2.9 MMOL/L (ref 0.5–2)
D-LACTATE SERPL-SCNC: 3.3 MMOL/L (ref 0.5–2)
DEPRECATED RDW RBC AUTO: 45.8 FL (ref 37–54)
DEPRECATED RDW RBC AUTO: 45.9 FL (ref 37–54)
DEPRECATED RDW RBC AUTO: 46.5 FL (ref 37–54)
DEPRECATED RDW RBC AUTO: 46.5 FL (ref 37–54)
EGFRCR SERPLBLD CKD-EPI 2021: 111.8 ML/MIN/1.73
EGFRCR SERPLBLD CKD-EPI 2021: 114.8 ML/MIN/1.73
EOSINOPHIL # BLD AUTO: 0.14 10*3/MM3 (ref 0–0.4)
EOSINOPHIL NFR BLD AUTO: 0.9 % (ref 0.3–6.2)
ERYTHROCYTE [DISTWIDTH] IN BLOOD BY AUTOMATED COUNT: 14.7 % (ref 12.3–15.4)
ERYTHROCYTE [DISTWIDTH] IN BLOOD BY AUTOMATED COUNT: 14.7 % (ref 12.3–15.4)
ERYTHROCYTE [DISTWIDTH] IN BLOOD BY AUTOMATED COUNT: 14.8 % (ref 12.3–15.4)
ERYTHROCYTE [DISTWIDTH] IN BLOOD BY AUTOMATED COUNT: 14.8 % (ref 12.3–15.4)
FIBRINOGEN PPP-MCNC: 178 MG/DL (ref 240–460)
FIBRINOGEN PPP-MCNC: 180 MG/DL (ref 240–460)
GAS FLOW AIRWAY: 2.3 LPM
GLUCOSE BLDC GLUCOMTR-MCNC: 140 MG/DL (ref 70–130)
GLUCOSE BLDC GLUCOMTR-MCNC: 151 MG/DL (ref 70–130)
GLUCOSE BLDC GLUCOMTR-MCNC: 160 MG/DL (ref 70–130)
GLUCOSE BLDC GLUCOMTR-MCNC: 160 MG/DL (ref 70–130)
GLUCOSE BLDC GLUCOMTR-MCNC: 175 MG/DL (ref 70–130)
GLUCOSE BLDC GLUCOMTR-MCNC: 183 MG/DL (ref 70–130)
GLUCOSE BLDC GLUCOMTR-MCNC: 191 MG/DL (ref 70–130)
GLUCOSE BLDC GLUCOMTR-MCNC: 194 MG/DL (ref 70–130)
GLUCOSE SERPL-MCNC: 160 MG/DL (ref 65–99)
GLUCOSE SERPL-MCNC: 189 MG/DL (ref 65–99)
HCO3 BLDA-SCNC: 23.6 MMOL/L (ref 20–26)
HCO3 BLDA-SCNC: 23.7 MMOL/L (ref 20–26)
HCO3 BLDA-SCNC: 23.8 MMOL/L (ref 20–26)
HCO3 BLDA-SCNC: 24.9 MMOL/L (ref 20–26)
HCO3 BLDA-SCNC: 25.4 MMOL/L (ref 20–26)
HCO3 BLDA-SCNC: 25.9 MMOL/L (ref 20–26)
HCO3 BLDA-SCNC: 26.4 MMOL/L (ref 20–26)
HCO3 BLDA-SCNC: 26.5 MMOL/L (ref 20–26)
HCO3 BLDA-SCNC: 28.6 MMOL/L (ref 20–26)
HCT VFR BLD AUTO: 30.1 % (ref 37.5–51)
HCT VFR BLD AUTO: 30.1 % (ref 37.5–51)
HCT VFR BLD AUTO: 35.6 % (ref 37.5–51)
HCT VFR BLD AUTO: 38.5 % (ref 37.5–51)
HCT VFR BLD CALC: 29.6 % (ref 38–51)
HCT VFR BLD CALC: 31.2 % (ref 38–51)
HCT VFR BLD CALC: 31.3 % (ref 38–51)
HCT VFR BLD CALC: 32.7 % (ref 38–51)
HCT VFR BLD CALC: 37 % (ref 38–51)
HCT VFR BLD CALC: 41 % (ref 38–51)
HCT VFR BLD CALC: 41.7 % (ref 38–51)
HGB BLD-MCNC: 11.5 G/DL (ref 13–17.7)
HGB BLD-MCNC: 12.4 G/DL (ref 13–17.7)
HGB BLD-MCNC: 9.9 G/DL (ref 13–17.7)
HGB BLD-MCNC: 9.9 G/DL (ref 13–17.7)
HGB BLDA-MCNC: 10.2 G/DL (ref 14–18)
HGB BLDA-MCNC: 10.2 G/DL (ref 14–18)
HGB BLDA-MCNC: 10.7 G/DL (ref 14–18)
HGB BLDA-MCNC: 12.1 G/DL (ref 14–18)
HGB BLDA-MCNC: 13.4 G/DL (ref 14–18)
HGB BLDA-MCNC: 13.6 G/DL (ref 14–18)
HGB BLDA-MCNC: 9.7 G/DL (ref 14–18)
IMM GRANULOCYTES # BLD AUTO: 0.17 10*3/MM3 (ref 0–0.05)
IMM GRANULOCYTES NFR BLD AUTO: 1.1 % (ref 0–0.5)
INHALED O2 CONCENTRATION: 100 %
INHALED O2 CONCENTRATION: 30 %
INHALED O2 CONCENTRATION: 30 %
INHALED O2 CONCENTRATION: 60 %
INHALED O2 CONCENTRATION: 90 %
INR PPP: 1.35 (ref 0.91–1.09)
INR PPP: 1.5 (ref 0.91–1.09)
LYMPHOCYTES # BLD AUTO: 1.53 10*3/MM3 (ref 0.7–3.1)
LYMPHOCYTES NFR BLD AUTO: 10 % (ref 19.6–45.3)
Lab: ABNORMAL
Lab: NORMAL
MCH RBC QN AUTO: 27.3 PG (ref 26.6–33)
MCH RBC QN AUTO: 27.6 PG (ref 26.6–33)
MCH RBC QN AUTO: 28.4 PG (ref 26.6–33)
MCH RBC QN AUTO: 28.4 PG (ref 26.6–33)
MCHC RBC AUTO-ENTMCNC: 32.2 G/DL (ref 31.5–35.7)
MCHC RBC AUTO-ENTMCNC: 32.3 G/DL (ref 31.5–35.7)
MCHC RBC AUTO-ENTMCNC: 32.9 G/DL (ref 31.5–35.7)
MCHC RBC AUTO-ENTMCNC: 32.9 G/DL (ref 31.5–35.7)
MCV RBC AUTO: 84.8 FL (ref 79–97)
MCV RBC AUTO: 85.4 FL (ref 79–97)
MCV RBC AUTO: 86.5 FL (ref 79–97)
MCV RBC AUTO: 86.5 FL (ref 79–97)
METHGB BLD QL: 0.6 % (ref 0–3)
METHGB BLD QL: 0.9 % (ref 0–3)
METHGB BLD QL: 1 % (ref 0–3)
METHGB BLD QL: 1.1 % (ref 0–3)
MODALITY: ABNORMAL
MONOCYTES # BLD AUTO: 0.57 10*3/MM3 (ref 0.1–0.9)
MONOCYTES NFR BLD AUTO: 3.7 % (ref 5–12)
NEUTROPHILS NFR BLD AUTO: 12.85 10*3/MM3 (ref 1.7–7)
NEUTROPHILS NFR BLD AUTO: 83.8 % (ref 42.7–76)
NOTIFIED BY: ABNORMAL
NOTIFIED BY: ABNORMAL
NOTIFIED WHO: ABNORMAL
NOTIFIED WHO: ABNORMAL
NRBC BLD AUTO-RTO: 0 /100 WBC (ref 0–0.2)
OXYHGB MFR BLDV: 97.6 % (ref 94–99)
OXYHGB MFR BLDV: 98.3 % (ref 94–99)
OXYHGB MFR BLDV: 98.4 % (ref 94–99)
OXYHGB MFR BLDV: 98.6 % (ref 94–99)
OXYHGB MFR BLDV: 98.8 % (ref 94–99)
OXYHGB MFR BLDV: 98.8 % (ref 94–99)
OXYHGB MFR BLDV: 98.9 % (ref 94–99)
PCO2 BLDA: 35.5 MM HG (ref 35–45)
PCO2 BLDA: 40.4 MM HG (ref 35–45)
PCO2 BLDA: 41.5 MM HG (ref 35–45)
PCO2 BLDA: 42.6 MM HG (ref 35–45)
PCO2 BLDA: 43.1 MM HG (ref 35–45)
PCO2 BLDA: 43.8 MM HG (ref 35–45)
PCO2 BLDA: 44.1 MM HG (ref 35–45)
PCO2 BLDA: 45.3 MM HG (ref 35–45)
PCO2 BLDA: 47.2 MM HG (ref 35–45)
PCO2 TEMP ADJ BLD: 30.5 MM HG (ref 35–45)
PCO2 TEMP ADJ BLD: 35.5 MM HG (ref 35–45)
PCO2 TEMP ADJ BLD: 40.4 MM HG (ref 35–45)
PCO2 TEMP ADJ BLD: 41.5 MM HG (ref 35–45)
PCO2 TEMP ADJ BLD: 42.6 MM HG (ref 35–45)
PCO2 TEMP ADJ BLD: 43.1 MM HG (ref 35–45)
PCO2 TEMP ADJ BLD: 43.8 MM HG (ref 35–45)
PCO2 TEMP ADJ BLD: 44.1 MM HG (ref 35–45)
PCO2 TEMP ADJ BLD: 47.2 MM HG (ref 35–45)
PEEP RESPIRATORY: 10 CM[H2O]
PEEP RESPIRATORY: 5 CM[H2O]
PEEP RESPIRATORY: 8 CM[H2O]
PH BLDA: 7.36 PH UNITS (ref 7.35–7.45)
PH BLDA: 7.37 PH UNITS (ref 7.35–7.45)
PH BLDA: 7.39 PH UNITS (ref 7.35–7.45)
PH BLDA: 7.39 PH UNITS (ref 7.35–7.45)
PH BLDA: 7.4 PH UNITS (ref 7.35–7.45)
PH BLDA: 7.42 PH UNITS (ref 7.35–7.45)
PH BLDA: 7.43 PH UNITS (ref 7.35–7.45)
PH, TEMP CORRECTED: 7.36 PH UNITS (ref 7.35–7.45)
PH, TEMP CORRECTED: 7.36 PH UNITS (ref 7.35–7.45)
PH, TEMP CORRECTED: 7.37 PH UNITS (ref 7.35–7.45)
PH, TEMP CORRECTED: 7.39 PH UNITS (ref 7.35–7.45)
PH, TEMP CORRECTED: 7.39 PH UNITS (ref 7.35–7.45)
PH, TEMP CORRECTED: 7.4 PH UNITS (ref 7.35–7.45)
PH, TEMP CORRECTED: 7.42 PH UNITS (ref 7.35–7.45)
PH, TEMP CORRECTED: 7.43 PH UNITS (ref 7.35–7.45)
PH, TEMP CORRECTED: 7.49 PH UNITS (ref 7.35–7.45)
PHOSPHATE SERPL-MCNC: 3.5 MG/DL (ref 2.5–4.5)
PHOSPHATE SERPL-MCNC: 3.7 MG/DL (ref 2.5–4.5)
PLATELET # BLD AUTO: 109 10*3/MM3 (ref 140–450)
PLATELET # BLD AUTO: 109 10*3/MM3 (ref 140–450)
PLATELET # BLD AUTO: 115 10*3/MM3 (ref 140–450)
PLATELET # BLD AUTO: 154 10*3/MM3 (ref 140–450)
PMV BLD AUTO: 10 FL (ref 6–12)
PMV BLD AUTO: 9.7 FL (ref 6–12)
PMV BLD AUTO: 9.8 FL (ref 6–12)
PMV BLD AUTO: 9.8 FL (ref 6–12)
PO2 BLDA: 101 MM HG (ref 83–108)
PO2 BLDA: 221 MM HG (ref 83–108)
PO2 BLDA: 393 MM HG (ref 83–108)
PO2 BLDA: 422 MM HG (ref 83–108)
PO2 BLDA: 448 MM HG (ref 83–108)
PO2 BLDA: 474 MM HG (ref 83–108)
PO2 BLDA: 527 MM HG (ref 83–108)
PO2 BLDA: 532 MM HG (ref 83–108)
PO2 BLDA: 90.6 MM HG (ref 83–108)
PO2 TEMP ADJ BLD: 101 MM HG (ref 83–108)
PO2 TEMP ADJ BLD: 221 MM HG (ref 83–108)
PO2 TEMP ADJ BLD: 372 MM HG (ref 83–108)
PO2 TEMP ADJ BLD: 393 MM HG (ref 83–108)
PO2 TEMP ADJ BLD: 448 MM HG (ref 83–108)
PO2 TEMP ADJ BLD: 474 MM HG (ref 83–108)
PO2 TEMP ADJ BLD: 527 MM HG (ref 83–108)
PO2 TEMP ADJ BLD: 532 MM HG (ref 83–108)
PO2 TEMP ADJ BLD: 90.6 MM HG (ref 83–108)
POTASSIUM BLDA-SCNC: 3.7 MMOL/L (ref 3.5–5.2)
POTASSIUM BLDA-SCNC: 3.7 MMOL/L (ref 3.5–5.2)
POTASSIUM BLDA-SCNC: 3.8 MMOL/L (ref 3.5–5.2)
POTASSIUM BLDA-SCNC: 3.9 MMOL/L (ref 3.5–5.2)
POTASSIUM BLDA-SCNC: 4.1 MMOL/L (ref 3.5–5.2)
POTASSIUM BLDA-SCNC: 4.2 MMOL/L (ref 3.5–5.2)
POTASSIUM BLDA-SCNC: 4.4 MMOL/L (ref 3.5–5.2)
POTASSIUM SERPL-SCNC: 4 MMOL/L (ref 3.5–5.2)
POTASSIUM SERPL-SCNC: 4.4 MMOL/L (ref 3.5–5.2)
PROTHROMBIN TIME: 17.2 SECONDS (ref 11.8–14.8)
PROTHROMBIN TIME: 18.7 SECONDS (ref 11.8–14.8)
PSV: 10 CMH2O
RBC # BLD AUTO: 3.48 10*6/MM3 (ref 4.14–5.8)
RBC # BLD AUTO: 3.48 10*6/MM3 (ref 4.14–5.8)
RBC # BLD AUTO: 4.17 10*6/MM3 (ref 4.14–5.8)
RBC # BLD AUTO: 4.54 10*6/MM3 (ref 4.14–5.8)
SAO2 % BLDCOA: 97.1 % (ref 94–99)
SAO2 % BLDCOA: 98.1 % (ref 94–99)
SAO2 % BLDCOA: 99.6 % (ref 94–99)
SAO2 % BLDCOA: >100.1 % (ref 94–99)
SET MECH RESP RATE: 20
SET MECH RESP RATE: 20
SODIUM BLDA-SCNC: 140 MMOL/L (ref 136–145)
SODIUM BLDA-SCNC: 140 MMOL/L (ref 136–145)
SODIUM BLDA-SCNC: 141 MMOL/L (ref 136–145)
SODIUM BLDA-SCNC: 142 MMOL/L (ref 136–145)
SODIUM BLDA-SCNC: 142 MMOL/L (ref 136–145)
SODIUM SERPL-SCNC: 141 MMOL/L (ref 136–145)
SODIUM SERPL-SCNC: 143 MMOL/L (ref 136–145)
VENTILATOR MODE: ABNORMAL
VT ON VENT VENT: 550 ML
VT ON VENT VENT: 550 ML
WBC NRBC COR # BLD AUTO: 11.19 10*3/MM3 (ref 3.4–10.8)
WBC NRBC COR # BLD AUTO: 15.37 10*3/MM3 (ref 3.4–10.8)
WBC NRBC COR # BLD AUTO: 15.37 10*3/MM3 (ref 3.4–10.8)
WBC NRBC COR # BLD AUTO: 18.29 10*3/MM3 (ref 3.4–10.8)

## 2024-04-17 PROCEDURE — 5A1221Z PERFORMANCE OF CARDIAC OUTPUT, CONTINUOUS: ICD-10-PCS | Performed by: SURGERY

## 2024-04-17 PROCEDURE — 83050 HGB METHEMOGLOBIN QUAN: CPT

## 2024-04-17 PROCEDURE — 25810000003 SODIUM CHLORIDE 0.9 % SOLUTION 250 ML FLEX CONT: Performed by: NURSE ANESTHETIST, CERTIFIED REGISTERED

## 2024-04-17 PROCEDURE — 93318 ECHO TRANSESOPHAGEAL INTRAOP: CPT | Performed by: NURSE ANESTHETIST, CERTIFIED REGISTERED

## 2024-04-17 PROCEDURE — C1751 CATH, INF, PER/CENT/MIDLINE: HCPCS | Performed by: NURSE ANESTHETIST, CERTIFIED REGISTERED

## 2024-04-17 PROCEDURE — 25010000002 ALBUMIN HUMAN 5% PER 50 ML: Performed by: SURGERY

## 2024-04-17 PROCEDURE — 25010000002 ALBUMIN HUMAN 25% PER 50 ML

## 2024-04-17 PROCEDURE — 82948 REAGENT STRIP/BLOOD GLUCOSE: CPT

## 2024-04-17 PROCEDURE — 25010000002 PROTAMINE SULFATE PER 10 MG: Performed by: NURSE ANESTHETIST, CERTIFIED REGISTERED

## 2024-04-17 PROCEDURE — 25010000002 BUPIVACAINE (PF) 0.5 % SOLUTION 30 ML VIAL: Performed by: SURGERY

## 2024-04-17 PROCEDURE — 25810000003 LACTATED RINGERS PER 1000 ML

## 2024-04-17 PROCEDURE — 94799 UNLISTED PULMONARY SVC/PX: CPT

## 2024-04-17 PROCEDURE — 94002 VENT MGMT INPAT INIT DAY: CPT

## 2024-04-17 PROCEDURE — 25010000002 HEPARIN (PORCINE) PER 1000 UNITS: Performed by: NURSE ANESTHETIST, CERTIFIED REGISTERED

## 2024-04-17 PROCEDURE — C1713 ANCHOR/SCREW BN/BN,TIS/BN: HCPCS | Performed by: SURGERY

## 2024-04-17 PROCEDURE — C1889 IMPLANT/INSERT DEVICE, NOC: HCPCS | Performed by: SURGERY

## 2024-04-17 PROCEDURE — 85025 COMPLETE CBC W/AUTO DIFF WBC: CPT | Performed by: SURGERY

## 2024-04-17 PROCEDURE — 85730 THROMBOPLASTIN TIME PARTIAL: CPT | Performed by: SURGERY

## 2024-04-17 PROCEDURE — 94761 N-INVAS EAR/PLS OXIMETRY MLT: CPT

## 2024-04-17 PROCEDURE — 36430 TRANSFUSION BLD/BLD COMPNT: CPT

## 2024-04-17 PROCEDURE — 25010000002 EPINEPHRINE PER 0.1 MG: Performed by: SURGERY

## 2024-04-17 PROCEDURE — 25810000003 LACTATED RINGERS PER 1000 ML: Performed by: ANESTHESIOLOGY

## 2024-04-17 PROCEDURE — 25810000003 SODIUM CHLORIDE 0.9 % SOLUTION: Performed by: SURGERY

## 2024-04-17 PROCEDURE — 25010000002 NICARDIPINE 2.5 MG/ML SOLUTION: Performed by: SURGERY

## 2024-04-17 PROCEDURE — 94003 VENT MGMT INPAT SUBQ DAY: CPT

## 2024-04-17 PROCEDURE — 25010000002 PHENYLEPHRINE 10 MG/ML SOLUTION

## 2024-04-17 PROCEDURE — 85027 COMPLETE CBC AUTOMATED: CPT | Performed by: SURGERY

## 2024-04-17 PROCEDURE — C9290 INJ, BUPIVACAINE LIPOSOME: HCPCS | Performed by: SURGERY

## 2024-04-17 PROCEDURE — 85384 FIBRINOGEN ACTIVITY: CPT | Performed by: SURGERY

## 2024-04-17 PROCEDURE — 0 INSULIN REGULAR HUMAN PER 5 UNITS: Performed by: SURGERY

## 2024-04-17 PROCEDURE — S0260 H&P FOR SURGERY: HCPCS | Performed by: SURGERY

## 2024-04-17 PROCEDURE — 82805 BLOOD GASES W/O2 SATURATION: CPT

## 2024-04-17 PROCEDURE — 88313 SPECIAL STAINS GROUP 2: CPT | Performed by: SURGERY

## 2024-04-17 PROCEDURE — 25810000003 LACTATED RINGERS PER 1000 ML: Performed by: SURGERY

## 2024-04-17 PROCEDURE — C1760 CLOSURE DEV, VASC: HCPCS | Performed by: SURGERY

## 2024-04-17 PROCEDURE — 25810000003 DEXTROSE 5 % WITH KCL 20 MEQ 20 MEQ/L SOLUTION: Performed by: SURGERY

## 2024-04-17 PROCEDURE — 25010000002 HEPARIN (PORCINE) PER 1000 UNITS

## 2024-04-17 PROCEDURE — 80069 RENAL FUNCTION PANEL: CPT | Performed by: SURGERY

## 2024-04-17 PROCEDURE — 94640 AIRWAY INHALATION TREATMENT: CPT

## 2024-04-17 PROCEDURE — 93005 ELECTROCARDIOGRAM TRACING: CPT | Performed by: SURGERY

## 2024-04-17 PROCEDURE — P9035 PLATELET PHERES LEUKOREDUCED: HCPCS

## 2024-04-17 PROCEDURE — 71045 X-RAY EXAM CHEST 1 VIEW: CPT

## 2024-04-17 PROCEDURE — P9012 CRYOPRECIPITATE EACH UNIT: HCPCS

## 2024-04-17 PROCEDURE — 93312 ECHO TRANSESOPHAGEAL: CPT | Performed by: INTERNAL MEDICINE

## 2024-04-17 PROCEDURE — 85610 PROTHROMBIN TIME: CPT | Performed by: SURGERY

## 2024-04-17 PROCEDURE — 25010000002 MIDAZOLAM PER 1 MG: Performed by: NURSE ANESTHETIST, CERTIFIED REGISTERED

## 2024-04-17 PROCEDURE — 93325 DOPPLER ECHO COLOR FLOW MAPG: CPT | Performed by: INTERNAL MEDICINE

## 2024-04-17 PROCEDURE — 86927 PLASMA FRESH FROZEN: CPT

## 2024-04-17 PROCEDURE — 3E080GC INTRODUCTION OF OTHER THERAPEUTIC SUBSTANCE INTO HEART, OPEN APPROACH: ICD-10-PCS | Performed by: SURGERY

## 2024-04-17 PROCEDURE — 25010000002 MIDAZOLAM PER 1 MG: Performed by: ANESTHESIOLOGY

## 2024-04-17 PROCEDURE — P9047 ALBUMIN (HUMAN), 25%, 50ML: HCPCS

## 2024-04-17 PROCEDURE — 25010000002 PROTAMINE SULFATE PER 10 MG

## 2024-04-17 PROCEDURE — P9041 ALBUMIN (HUMAN),5%, 50ML: HCPCS | Performed by: NURSE ANESTHETIST, CERTIFIED REGISTERED

## 2024-04-17 PROCEDURE — 25010000002 MANNITOL PER 50 ML

## 2024-04-17 PROCEDURE — C1768 GRAFT, VASCULAR: HCPCS | Performed by: SURGERY

## 2024-04-17 PROCEDURE — 25810000003 SODIUM CHLORIDE 0.9 % SOLUTION

## 2024-04-17 PROCEDURE — 02RX0JZ REPLACEMENT OF THORACIC AORTA, ASCENDING/ARCH WITH SYNTHETIC SUBSTITUTE, OPEN APPROACH: ICD-10-PCS | Performed by: SURGERY

## 2024-04-17 PROCEDURE — 33866 AORTIC HEMIARCH GRAFT: CPT | Performed by: SURGERY

## 2024-04-17 PROCEDURE — 25010000002 POTASSIUM CHLORIDE PER 2 MEQ OF POTASSIUM

## 2024-04-17 PROCEDURE — 88305 TISSUE EXAM BY PATHOLOGIST: CPT | Performed by: SURGERY

## 2024-04-17 PROCEDURE — 93010 ELECTROCARDIOGRAM REPORT: CPT | Performed by: INTERNAL MEDICINE

## 2024-04-17 PROCEDURE — 25010000002 VANCOMYCIN 10 G RECONSTITUTED SOLUTION: Performed by: SURGERY

## 2024-04-17 PROCEDURE — 25010000002 PROPOFOL 10 MG/ML EMULSION: Performed by: NURSE ANESTHETIST, CERTIFIED REGISTERED

## 2024-04-17 PROCEDURE — P9041 ALBUMIN (HUMAN),5%, 50ML: HCPCS | Performed by: SURGERY

## 2024-04-17 PROCEDURE — 82375 ASSAY CARBOXYHB QUANT: CPT

## 2024-04-17 PROCEDURE — 25810000003 SODIUM CHLORIDE 0.9 % SOLUTION: Performed by: NURSE ANESTHETIST, CERTIFIED REGISTERED

## 2024-04-17 PROCEDURE — 0 BUPIVACAINE LIPOSOME 1.3 % SUSPENSION 20 ML VIAL: Performed by: SURGERY

## 2024-04-17 PROCEDURE — 25010000002 SUGAMMADEX 200 MG/2ML SOLUTION: Performed by: NURSE ANESTHETIST, CERTIFIED REGISTERED

## 2024-04-17 PROCEDURE — 25010000002 VANCOMYCIN 1 G RECONSTITUTED SOLUTION 1 EACH VIAL: Performed by: SURGERY

## 2024-04-17 PROCEDURE — 25010000002 METHYLPREDNISOLONE PER 125 MG: Performed by: NURSE ANESTHETIST, CERTIFIED REGISTERED

## 2024-04-17 PROCEDURE — 82803 BLOOD GASES ANY COMBINATION: CPT

## 2024-04-17 PROCEDURE — 33863 ASCENDING AORTIC GRAFT: CPT | Performed by: SURGERY

## 2024-04-17 PROCEDURE — 25810000003 LACTATED RINGERS PER 1000 ML: Performed by: NURSE ANESTHETIST, CERTIFIED REGISTERED

## 2024-04-17 PROCEDURE — 25010000002 HEPARIN (PORCINE) PER 1000 UNITS: Performed by: SURGERY

## 2024-04-17 PROCEDURE — P9100 PATHOGEN TEST FOR PLATELETS: HCPCS

## 2024-04-17 PROCEDURE — 25010000002 ACETAMINOPHEN 10 MG/ML SOLUTION: Performed by: SURGERY

## 2024-04-17 PROCEDURE — 82330 ASSAY OF CALCIUM: CPT

## 2024-04-17 PROCEDURE — 25010000002 ALBUMIN HUMAN 5% PER 50 ML: Performed by: NURSE ANESTHETIST, CERTIFIED REGISTERED

## 2024-04-17 PROCEDURE — 25010000002 FUROSEMIDE PER 20 MG

## 2024-04-17 PROCEDURE — 88304 TISSUE EXAM BY PATHOLOGIST: CPT | Performed by: SURGERY

## 2024-04-17 PROCEDURE — 02RF0JZ REPLACEMENT OF AORTIC VALVE WITH SYNTHETIC SUBSTITUTE, OPEN APPROACH: ICD-10-PCS | Performed by: SURGERY

## 2024-04-17 PROCEDURE — 83605 ASSAY OF LACTIC ACID: CPT | Performed by: SURGERY

## 2024-04-17 DEVICE — ABSORBABLE HEMOSTAT (OXIDIZED REGENERATED CELLULOSE)
Type: IMPLANTABLE DEVICE | Site: HEART | Status: FUNCTIONAL
Brand: SURGICEL NU-KNIT

## 2024-04-17 DEVICE — IMPLANTABLE DEVICE: Type: IMPLANTABLE DEVICE | Site: HEART | Status: FUNCTIONAL

## 2024-04-17 DEVICE — COR-KNOT® QUICK LOAD® SINGLES
Type: IMPLANTABLE DEVICE | Site: HEART | Status: FUNCTIONAL
Brand: COR-KNOT® QUICK LOAD®

## 2024-04-17 DEVICE — ADHS SURG BIOGLUE PREF 5ML: Type: IMPLANTABLE DEVICE | Site: HEART | Status: FUNCTIONAL

## 2024-04-17 DEVICE — KT HEMOST ABS SURGIFOAM PORCN 1GRAM: Type: IMPLANTABLE DEVICE | Site: CHEST | Status: FUNCTIONAL

## 2024-04-17 DEVICE — PLEDGET INCISIONLINE REINF TFE SFT PTFE 1.5X3X7MM WHT: Type: IMPLANTABLE DEVICE | Site: HEART | Status: FUNCTIONAL

## 2024-04-17 DEVICE — WR SUT NONABS MF SS V40 1/2CIR TC 6/0 18IN M649G: Type: IMPLANTABLE DEVICE | Site: STERNUM | Status: FUNCTIONAL

## 2024-04-17 DEVICE — SEAL HEMO SURG ARISTA/AH ABS/PWDR 3GM: Type: IMPLANTABLE DEVICE | Site: HEART | Status: FUNCTIONAL

## 2024-04-17 DEVICE — WAX,BONE,NATURAL
Type: IMPLANTABLE DEVICE | Site: CHEST | Status: FUNCTIONAL
Brand: MEDLINE INDUSTRIES

## 2024-04-17 DEVICE — COR-KNOT MINI® COMBO KITBASE PACKAGE TYPE - KITEACH STERILE PACKAGE KIT CONTAINS (2) SINGLE PATIENT USE COR-KNOT MINI® DEVICES AND (12) COR-KNOT® QUICK LOADS®.
Type: IMPLANTABLE DEVICE | Site: HEART | Status: FUNCTIONAL
Brand: COR-KNOT MINI®

## 2024-04-17 RX ORDER — SODIUM CHLORIDE 0.9 % (FLUSH) 0.9 %
3 SYRINGE (ML) INJECTION AS NEEDED
Status: DISCONTINUED | OUTPATIENT
Start: 2024-04-17 | End: 2024-04-17 | Stop reason: HOSPADM

## 2024-04-17 RX ORDER — SODIUM CHLORIDE, SODIUM LACTATE, POTASSIUM CHLORIDE, CALCIUM CHLORIDE 600; 310; 30; 20 MG/100ML; MG/100ML; MG/100ML; MG/100ML
1000 INJECTION, SOLUTION INTRAVENOUS CONTINUOUS
Status: DISCONTINUED | OUTPATIENT
Start: 2024-04-17 | End: 2024-04-17

## 2024-04-17 RX ORDER — MIDAZOLAM HYDROCHLORIDE 1 MG/ML
INJECTION INTRAMUSCULAR; INTRAVENOUS AS NEEDED
Status: DISCONTINUED | OUTPATIENT
Start: 2024-04-17 | End: 2024-04-17 | Stop reason: SURG

## 2024-04-17 RX ORDER — ACETAMINOPHEN 325 MG/1
650 TABLET ORAL EVERY 6 HOURS SCHEDULED
Status: DISCONTINUED | OUTPATIENT
Start: 2024-04-18 | End: 2024-04-23 | Stop reason: HOSPADM

## 2024-04-17 RX ORDER — SODIUM CHLORIDE 9 MG/ML
40 INJECTION, SOLUTION INTRAVENOUS AS NEEDED
Status: DISCONTINUED | OUTPATIENT
Start: 2024-04-17 | End: 2024-04-17 | Stop reason: HOSPADM

## 2024-04-17 RX ORDER — ROCURONIUM BROMIDE 10 MG/ML
INJECTION, SOLUTION INTRAVENOUS AS NEEDED
Status: DISCONTINUED | OUTPATIENT
Start: 2024-04-17 | End: 2024-04-17 | Stop reason: SURG

## 2024-04-17 RX ORDER — POTASSIUM CHLORIDE, DEXTROSE MONOHYDRATE 150; 5 MG/100ML; G/100ML
30 INJECTION, SOLUTION INTRAVENOUS CONTINUOUS
Status: DISCONTINUED | OUTPATIENT
Start: 2024-04-17 | End: 2024-04-23

## 2024-04-17 RX ORDER — ACETAMINOPHEN 10 MG/ML
1000 INJECTION, SOLUTION INTRAVENOUS EVERY 8 HOURS SCHEDULED
Status: COMPLETED | OUTPATIENT
Start: 2024-04-17 | End: 2024-04-17

## 2024-04-17 RX ORDER — SODIUM CHLORIDE 0.9 % (FLUSH) 0.9 %
3-10 SYRINGE (ML) INJECTION AS NEEDED
Status: DISCONTINUED | OUTPATIENT
Start: 2024-04-17 | End: 2024-04-17 | Stop reason: HOSPADM

## 2024-04-17 RX ORDER — DEXTROSE MONOHYDRATE 25 G/50ML
10-50 INJECTION, SOLUTION INTRAVENOUS
Status: DISCONTINUED | OUTPATIENT
Start: 2024-04-17 | End: 2024-04-17 | Stop reason: HOSPADM

## 2024-04-17 RX ORDER — ASPIRIN 81 MG/1
81 TABLET ORAL DAILY
Status: DISCONTINUED | OUTPATIENT
Start: 2024-04-19 | End: 2024-04-23 | Stop reason: HOSPADM

## 2024-04-17 RX ORDER — LIDOCAINE HYDROCHLORIDE 20 MG/ML
INJECTION, SOLUTION EPIDURAL; INFILTRATION; INTRACAUDAL; PERINEURAL AS NEEDED
Status: DISCONTINUED | OUTPATIENT
Start: 2024-04-17 | End: 2024-04-17 | Stop reason: SURG

## 2024-04-17 RX ORDER — PANTOPRAZOLE SODIUM 40 MG/1
40 TABLET, DELAYED RELEASE ORAL
Status: DISCONTINUED | OUTPATIENT
Start: 2024-04-18 | End: 2024-04-23 | Stop reason: HOSPADM

## 2024-04-17 RX ORDER — CHLORHEXIDINE GLUCONATE ORAL RINSE 1.2 MG/ML
15 SOLUTION DENTAL EVERY 12 HOURS SCHEDULED
Status: DISCONTINUED | OUTPATIENT
Start: 2024-04-17 | End: 2024-04-17 | Stop reason: SDUPTHER

## 2024-04-17 RX ORDER — LIDOCAINE HYDROCHLORIDE 10 MG/ML
0.5 INJECTION, SOLUTION EPIDURAL; INFILTRATION; INTRACAUDAL; PERINEURAL ONCE AS NEEDED
Status: DISCONTINUED | OUTPATIENT
Start: 2024-04-17 | End: 2024-04-17 | Stop reason: HOSPADM

## 2024-04-17 RX ORDER — OXYCODONE HYDROCHLORIDE 5 MG/1
5 TABLET ORAL EVERY 4 HOURS PRN
Status: DISCONTINUED | OUTPATIENT
Start: 2024-04-17 | End: 2024-04-23 | Stop reason: HOSPADM

## 2024-04-17 RX ORDER — MORPHINE SULFATE 2 MG/ML
2 INJECTION, SOLUTION INTRAMUSCULAR; INTRAVENOUS
Status: DISCONTINUED | OUTPATIENT
Start: 2024-04-17 | End: 2024-04-18

## 2024-04-17 RX ORDER — DEXMEDETOMIDINE HYDROCHLORIDE 4 UG/ML
.2-1.5 INJECTION, SOLUTION INTRAVENOUS
Status: DISCONTINUED | OUTPATIENT
Start: 2024-04-17 | End: 2024-04-18

## 2024-04-17 RX ORDER — CHLORHEXIDINE GLUCONATE 500 MG/1
1 CLOTH TOPICAL EVERY 24 HOURS
Status: DISCONTINUED | OUTPATIENT
Start: 2024-04-18 | End: 2024-04-18 | Stop reason: HOSPADM

## 2024-04-17 RX ORDER — ACETAMINOPHEN 650 MG/1
650 SUPPOSITORY RECTAL EVERY 4 HOURS PRN
Status: DISCONTINUED | OUTPATIENT
Start: 2024-04-18 | End: 2024-04-17

## 2024-04-17 RX ORDER — VANCOMYCIN/0.9 % SOD CHLORIDE 1.5G/250ML
1500 PLASTIC BAG, INJECTION (ML) INTRAVENOUS EVERY 12 HOURS
Status: COMPLETED | OUTPATIENT
Start: 2024-04-17 | End: 2024-04-18

## 2024-04-17 RX ORDER — IBUPROFEN 600 MG/1
1 TABLET ORAL
Status: DISCONTINUED | OUTPATIENT
Start: 2024-04-17 | End: 2024-04-17 | Stop reason: HOSPADM

## 2024-04-17 RX ORDER — ARIPIPRAZOLE 5 MG/1
5 TABLET ORAL DAILY
Status: DISCONTINUED | OUTPATIENT
Start: 2024-04-18 | End: 2024-04-23 | Stop reason: HOSPADM

## 2024-04-17 RX ORDER — QUETIAPINE FUMARATE 100 MG/1
200 TABLET, FILM COATED ORAL NIGHTLY
Status: DISCONTINUED | OUTPATIENT
Start: 2024-04-17 | End: 2024-04-18

## 2024-04-17 RX ORDER — VECURONIUM BROMIDE 1 MG/ML
INJECTION, POWDER, LYOPHILIZED, FOR SOLUTION INTRAVENOUS AS NEEDED
Status: DISCONTINUED | OUTPATIENT
Start: 2024-04-17 | End: 2024-04-17 | Stop reason: SURG

## 2024-04-17 RX ORDER — NOREPINEPHRINE BITARTRATE 0.03 MG/ML
.02-.3 INJECTION, SOLUTION INTRAVENOUS CONTINUOUS PRN
Status: DISCONTINUED | OUTPATIENT
Start: 2024-04-17 | End: 2024-04-18 | Stop reason: HOSPADM

## 2024-04-17 RX ORDER — SODIUM CHLORIDE, SODIUM LACTATE, POTASSIUM CHLORIDE, CALCIUM CHLORIDE 600; 310; 30; 20 MG/100ML; MG/100ML; MG/100ML; MG/100ML
100 INJECTION, SOLUTION INTRAVENOUS CONTINUOUS
Status: DISCONTINUED | OUTPATIENT
Start: 2024-04-17 | End: 2024-04-17

## 2024-04-17 RX ORDER — OXYCODONE HYDROCHLORIDE 10 MG/1
10 TABLET ORAL EVERY 4 HOURS PRN
Status: DISCONTINUED | OUTPATIENT
Start: 2024-04-17 | End: 2024-04-22

## 2024-04-17 RX ORDER — SODIUM CHLORIDE 0.9 % (FLUSH) 0.9 %
10 SYRINGE (ML) INJECTION AS NEEDED
Status: DISCONTINUED | OUTPATIENT
Start: 2024-04-17 | End: 2024-04-17 | Stop reason: HOSPADM

## 2024-04-17 RX ORDER — SODIUM CHLORIDE 0.9 % (FLUSH) 0.9 %
10 SYRINGE (ML) INJECTION EVERY 12 HOURS SCHEDULED
Status: DISCONTINUED | OUTPATIENT
Start: 2024-04-17 | End: 2024-04-17 | Stop reason: HOSPADM

## 2024-04-17 RX ORDER — MAGNESIUM HYDROXIDE 1200 MG/15ML
LIQUID ORAL AS NEEDED
Status: DISCONTINUED | OUTPATIENT
Start: 2024-04-17 | End: 2024-04-17 | Stop reason: HOSPADM

## 2024-04-17 RX ORDER — ATORVASTATIN CALCIUM 10 MG/1
10 TABLET, FILM COATED ORAL DAILY
Status: DISCONTINUED | OUTPATIENT
Start: 2024-04-17 | End: 2024-04-17

## 2024-04-17 RX ORDER — MIDAZOLAM HYDROCHLORIDE 1 MG/ML
2 INJECTION INTRAMUSCULAR; INTRAVENOUS
Status: DISCONTINUED | OUTPATIENT
Start: 2024-04-17 | End: 2024-04-17 | Stop reason: HOSPADM

## 2024-04-17 RX ORDER — DEXTROSE MONOHYDRATE 25 G/50ML
10-50 INJECTION, SOLUTION INTRAVENOUS
Status: DISCONTINUED | OUTPATIENT
Start: 2024-04-17 | End: 2024-04-18

## 2024-04-17 RX ORDER — IPRATROPIUM BROMIDE AND ALBUTEROL SULFATE 2.5; .5 MG/3ML; MG/3ML
3 SOLUTION RESPIRATORY (INHALATION)
Status: DISCONTINUED | OUTPATIENT
Start: 2024-04-17 | End: 2024-04-18

## 2024-04-17 RX ORDER — DEXMEDETOMIDINE HYDROCHLORIDE 4 UG/ML
INJECTION, SOLUTION INTRAVENOUS CONTINUOUS PRN
Status: DISCONTINUED | OUTPATIENT
Start: 2024-04-17 | End: 2024-04-17 | Stop reason: SURG

## 2024-04-17 RX ORDER — NICOTINE POLACRILEX 4 MG
15 LOZENGE BUCCAL
Status: DISCONTINUED | OUTPATIENT
Start: 2024-04-17 | End: 2024-04-18

## 2024-04-17 RX ORDER — HEPARIN SODIUM 1000 [USP'U]/ML
INJECTION, SOLUTION INTRAVENOUS; SUBCUTANEOUS AS NEEDED
Status: DISCONTINUED | OUTPATIENT
Start: 2024-04-17 | End: 2024-04-17 | Stop reason: SURG

## 2024-04-17 RX ORDER — CALCIUM CHLORIDE 100 MG/ML
INJECTION INTRAVENOUS; INTRAVENTRICULAR AS NEEDED
Status: DISCONTINUED | OUTPATIENT
Start: 2024-04-17 | End: 2024-04-17 | Stop reason: SURG

## 2024-04-17 RX ORDER — ACETAMINOPHEN 325 MG/1
650 TABLET ORAL EVERY 4 HOURS PRN
Status: DISCONTINUED | OUTPATIENT
Start: 2024-04-18 | End: 2024-04-17

## 2024-04-17 RX ORDER — SODIUM CHLORIDE 0.9 % (FLUSH) 0.9 %
3 SYRINGE (ML) INJECTION EVERY 12 HOURS SCHEDULED
Status: DISCONTINUED | OUTPATIENT
Start: 2024-04-17 | End: 2024-04-17 | Stop reason: HOSPADM

## 2024-04-17 RX ORDER — ALBUTEROL SULFATE 2.5 MG/3ML
2.5 SOLUTION RESPIRATORY (INHALATION) EVERY 4 HOURS PRN
Status: ACTIVE | OUTPATIENT
Start: 2024-04-17 | End: 2024-04-18

## 2024-04-17 RX ORDER — POLYETHYLENE GLYCOL 3350 17 G/17G
17 POWDER, FOR SOLUTION ORAL DAILY
Status: DISCONTINUED | OUTPATIENT
Start: 2024-04-18 | End: 2024-04-23 | Stop reason: HOSPADM

## 2024-04-17 RX ORDER — MEPERIDINE HYDROCHLORIDE 50 MG/ML
25 INJECTION INTRAMUSCULAR; INTRAVENOUS; SUBCUTANEOUS
Status: DISCONTINUED | OUTPATIENT
Start: 2024-04-17 | End: 2024-04-18

## 2024-04-17 RX ORDER — PROPOFOL 10 MG/ML
VIAL (ML) INTRAVENOUS AS NEEDED
Status: DISCONTINUED | OUTPATIENT
Start: 2024-04-17 | End: 2024-04-17 | Stop reason: SURG

## 2024-04-17 RX ORDER — VANCOMYCIN/0.9 % SOD CHLORIDE 1.5G/250ML
PLASTIC BAG, INJECTION (ML) INTRAVENOUS AS NEEDED
Status: DISCONTINUED | OUTPATIENT
Start: 2024-04-17 | End: 2024-04-17 | Stop reason: SURG

## 2024-04-17 RX ORDER — ENOXAPARIN SODIUM 100 MG/ML
40 INJECTION SUBCUTANEOUS DAILY
Status: DISCONTINUED | OUTPATIENT
Start: 2024-04-18 | End: 2024-04-23 | Stop reason: HOSPADM

## 2024-04-17 RX ORDER — ASPIRIN 81 MG/1
162 TABLET, CHEWABLE ORAL ONCE
Status: COMPLETED | OUTPATIENT
Start: 2024-04-18 | End: 2024-04-18

## 2024-04-17 RX ORDER — NICOTINE POLACRILEX 4 MG
15 LOZENGE BUCCAL
Status: DISCONTINUED | OUTPATIENT
Start: 2024-04-17 | End: 2024-04-17 | Stop reason: HOSPADM

## 2024-04-17 RX ORDER — SERTRALINE HYDROCHLORIDE 100 MG/1
200 TABLET, FILM COATED ORAL DAILY
Status: DISCONTINUED | OUTPATIENT
Start: 2024-04-18 | End: 2024-04-23 | Stop reason: HOSPADM

## 2024-04-17 RX ORDER — ACETAMINOPHEN 160 MG/5ML
650 SOLUTION ORAL EVERY 4 HOURS PRN
Status: DISCONTINUED | OUTPATIENT
Start: 2024-04-18 | End: 2024-04-17

## 2024-04-17 RX ORDER — ONDANSETRON 2 MG/ML
4 INJECTION INTRAMUSCULAR; INTRAVENOUS EVERY 6 HOURS PRN
Status: DISCONTINUED | OUTPATIENT
Start: 2024-04-17 | End: 2024-04-23 | Stop reason: HOSPADM

## 2024-04-17 RX ORDER — SODIUM CHLORIDE, SODIUM LACTATE, POTASSIUM CHLORIDE, CALCIUM CHLORIDE 600; 310; 30; 20 MG/100ML; MG/100ML; MG/100ML; MG/100ML
INJECTION, SOLUTION INTRAVENOUS CONTINUOUS PRN
Status: DISCONTINUED | OUTPATIENT
Start: 2024-04-17 | End: 2024-04-17 | Stop reason: SURG

## 2024-04-17 RX ORDER — PROTAMINE SULFATE 10 MG/ML
INJECTION, SOLUTION INTRAVENOUS AS NEEDED
Status: DISCONTINUED | OUTPATIENT
Start: 2024-04-17 | End: 2024-04-17 | Stop reason: SURG

## 2024-04-17 RX ORDER — METOPROLOL TARTRATE 1 MG/ML
INJECTION, SOLUTION INTRAVENOUS AS NEEDED
Status: DISCONTINUED | OUTPATIENT
Start: 2024-04-17 | End: 2024-04-17 | Stop reason: SURG

## 2024-04-17 RX ORDER — BISACODYL 5 MG/1
10 TABLET, DELAYED RELEASE ORAL 2 TIMES DAILY
Status: DISCONTINUED | OUTPATIENT
Start: 2024-04-18 | End: 2024-04-23 | Stop reason: HOSPADM

## 2024-04-17 RX ORDER — SODIUM CHLORIDE 9 MG/ML
INJECTION, SOLUTION INTRAVENOUS CONTINUOUS PRN
Status: DISCONTINUED | OUTPATIENT
Start: 2024-04-17 | End: 2024-04-17 | Stop reason: SURG

## 2024-04-17 RX ORDER — CHLORHEXIDINE GLUCONATE ORAL RINSE 1.2 MG/ML
15 SOLUTION DENTAL EVERY 12 HOURS
Status: DISCONTINUED | OUTPATIENT
Start: 2024-04-17 | End: 2024-04-20

## 2024-04-17 RX ORDER — ALBUMIN, HUMAN INJ 5% 5 %
SOLUTION INTRAVENOUS CONTINUOUS PRN
Status: DISCONTINUED | OUTPATIENT
Start: 2024-04-17 | End: 2024-04-17 | Stop reason: SURG

## 2024-04-17 RX ORDER — NITROGLYCERIN 0.4 MG/1
0.4 TABLET SUBLINGUAL
Status: DISCONTINUED | OUTPATIENT
Start: 2024-04-17 | End: 2024-04-23 | Stop reason: HOSPADM

## 2024-04-17 RX ORDER — SODIUM CHLORIDE 0.9 % (FLUSH) 0.9 %
30 SYRINGE (ML) INJECTION ONCE AS NEEDED
Status: DISCONTINUED | OUTPATIENT
Start: 2024-04-17 | End: 2024-04-17 | Stop reason: HOSPADM

## 2024-04-17 RX ORDER — ATORVASTATIN CALCIUM 10 MG/1
20 TABLET, FILM COATED ORAL NIGHTLY
Status: DISCONTINUED | OUTPATIENT
Start: 2024-04-18 | End: 2024-04-23 | Stop reason: HOSPADM

## 2024-04-17 RX ORDER — ALBUMIN, HUMAN INJ 5% 5 %
500 SOLUTION INTRAVENOUS AS NEEDED
Status: DISCONTINUED | OUTPATIENT
Start: 2024-04-17 | End: 2024-04-19

## 2024-04-17 RX ORDER — SODIUM CHLORIDE 9 MG/ML
30 INJECTION, SOLUTION INTRAVENOUS CONTINUOUS PRN
Status: DISCONTINUED | OUTPATIENT
Start: 2024-04-17 | End: 2024-04-17 | Stop reason: HOSPADM

## 2024-04-17 RX ORDER — CLONAZEPAM 0.5 MG/1
0.5 TABLET ORAL EVERY 12 HOURS SCHEDULED
Qty: 10 TABLET | Refills: 0 | Status: COMPLETED | OUTPATIENT
Start: 2024-04-18 | End: 2024-04-22

## 2024-04-17 RX ORDER — ACETAMINOPHEN 500 MG
1000 TABLET ORAL ONCE
Status: COMPLETED | OUTPATIENT
Start: 2024-04-17 | End: 2024-04-17

## 2024-04-17 RX ADMIN — AMINOCAPROIC ACID 1 G/HR: 250 INJECTION, SOLUTION INTRAVENOUS at 07:41

## 2024-04-17 RX ADMIN — SODIUM CHLORIDE: 9 INJECTION, SOLUTION INTRAVENOUS at 07:41

## 2024-04-17 RX ADMIN — ALBUMIN HUMAN: 0.05 INJECTION, SOLUTION INTRAVENOUS at 13:02

## 2024-04-17 RX ADMIN — PROPOFOL 70 MG: 10 INJECTION, EMULSION INTRAVENOUS at 07:25

## 2024-04-17 RX ADMIN — MIDAZOLAM HYDROCHLORIDE 5 MG: 1 INJECTION, SOLUTION INTRAMUSCULAR; INTRAVENOUS at 11:18

## 2024-04-17 RX ADMIN — ALBUMIN (HUMAN) 500 ML: 12.5 INJECTION, SOLUTION INTRAVENOUS at 18:52

## 2024-04-17 RX ADMIN — IPRATROPIUM BROMIDE AND ALBUTEROL SULFATE 3 ML: .5; 3 SOLUTION RESPIRATORY (INHALATION) at 18:30

## 2024-04-17 RX ADMIN — LIDOCAINE HYDROCHLORIDE 100 MG: 20 INJECTION, SOLUTION EPIDURAL; INFILTRATION; INTRACAUDAL; PERINEURAL at 07:25

## 2024-04-17 RX ADMIN — HEPARIN SODIUM 36000 UNITS: 1000 INJECTION, SOLUTION INTRAVENOUS; SUBCUTANEOUS at 08:25

## 2024-04-17 RX ADMIN — SODIUM CHLORIDE, POTASSIUM CHLORIDE, SODIUM LACTATE AND CALCIUM CHLORIDE 1000 ML: 600; 310; 30; 20 INJECTION, SOLUTION INTRAVENOUS at 06:10

## 2024-04-17 RX ADMIN — SODIUM CHLORIDE 500 MG: 9 INJECTION, SOLUTION INTRAVENOUS at 08:57

## 2024-04-17 RX ADMIN — METOPROLOL TARTRATE 1 MG: 5 INJECTION INTRAVENOUS at 07:57

## 2024-04-17 RX ADMIN — AMINOCAPROIC ACID 5 G: 250 INJECTION, SOLUTION INTRAVENOUS at 07:42

## 2024-04-17 RX ADMIN — POTASSIUM CHLORIDE AND DEXTROSE MONOHYDRATE 30 ML/HR: 150; 5 INJECTION, SOLUTION INTRAVENOUS at 13:54

## 2024-04-17 RX ADMIN — ACETAMINOPHEN 1000 MG: 10 INJECTION, SOLUTION INTRAVENOUS at 14:56

## 2024-04-17 RX ADMIN — ALBUMIN (HUMAN) 500 ML: 12.5 INJECTION, SOLUTION INTRAVENOUS at 22:39

## 2024-04-17 RX ADMIN — Medication 1 APPLICATION: at 06:02

## 2024-04-17 RX ADMIN — PROPOFOL 130 MG: 10 INJECTION, EMULSION INTRAVENOUS at 09:51

## 2024-04-17 RX ADMIN — ACETAMINOPHEN 1000 MG: 10 INJECTION, SOLUTION INTRAVENOUS at 22:05

## 2024-04-17 RX ADMIN — ROCURONIUM BROMIDE 100 MG: 50 INJECTION INTRAVENOUS at 07:25

## 2024-04-17 RX ADMIN — OXYCODONE HYDROCHLORIDE 5 MG: 5 TABLET ORAL at 23:39

## 2024-04-17 RX ADMIN — CHLORHEXIDINE GLUCONATE 0.12% ORAL RINSE 15 ML: 1.2 LIQUID ORAL at 17:53

## 2024-04-17 RX ADMIN — NICARDIPINE HYDROCHLORIDE 5 MG/HR: 2.5 INJECTION, SOLUTION INTRAVENOUS at 13:40

## 2024-04-17 RX ADMIN — VECURONIUM BROMIDE 10 MG: 10 INJECTION, POWDER, LYOPHILIZED, FOR SOLUTION INTRAVENOUS at 08:11

## 2024-04-17 RX ADMIN — Medication 1 APPLICATION: at 17:53

## 2024-04-17 RX ADMIN — MIDAZOLAM HYDROCHLORIDE 5 MG: 1 INJECTION, SOLUTION INTRAMUSCULAR; INTRAVENOUS at 07:25

## 2024-04-17 RX ADMIN — SUFENTANIL CITRATE 12.8 MCG: 50 INJECTION EPIDURAL; INTRAVENOUS at 13:29

## 2024-04-17 RX ADMIN — SUGAMMADEX 200 MG: 100 INJECTION, SOLUTION INTRAVENOUS at 13:38

## 2024-04-17 RX ADMIN — SODIUM CHLORIDE, POTASSIUM CHLORIDE, SODIUM LACTATE AND CALCIUM CHLORIDE 100 ML/HR: 600; 310; 30; 20 INJECTION, SOLUTION INTRAVENOUS at 06:10

## 2024-04-17 RX ADMIN — DEXMEDETOMIDINE HYDROCHLORIDE 1 MCG/KG/HR: 4 INJECTION, SOLUTION INTRAVENOUS at 14:56

## 2024-04-17 RX ADMIN — CALCIUM CHLORIDE 1 G: 100 INJECTION INTRAVENOUS; INTRAVENTRICULAR at 13:02

## 2024-04-17 RX ADMIN — POTASSIUM CHLORIDE AND DEXTROSE MONOHYDRATE 30 ML/HR: 150; 5 INJECTION, SOLUTION INTRAVENOUS at 14:23

## 2024-04-17 RX ADMIN — SUFENTANIL CITRATE 50 MCG: 50 INJECTION EPIDURAL; INTRAVENOUS at 08:45

## 2024-04-17 RX ADMIN — DEXMEDETOMIDINE HYDROCHLORIDE 1 MCG/KG/HR: 4 INJECTION, SOLUTION INTRAVENOUS at 11:47

## 2024-04-17 RX ADMIN — SUFENTANIL CITRATE 100 MCG: 50 INJECTION EPIDURAL; INTRAVENOUS at 07:25

## 2024-04-17 RX ADMIN — Medication 1.5 G: at 07:30

## 2024-04-17 RX ADMIN — VECURONIUM BROMIDE 5 MG: 10 INJECTION, POWDER, LYOPHILIZED, FOR SOLUTION INTRAVENOUS at 11:18

## 2024-04-17 RX ADMIN — QUETIAPINE FUMARATE 200 MG: 100 TABLET ORAL at 23:38

## 2024-04-17 RX ADMIN — SODIUM CHLORIDE 2 UNITS/HR: 9 INJECTION, SOLUTION INTRAVENOUS at 14:36

## 2024-04-17 RX ADMIN — SUFENTANIL CITRATE 0.5 MCG/KG/HR: 50 INJECTION EPIDURAL; INTRAVENOUS at 07:45

## 2024-04-17 RX ADMIN — SODIUM CHLORIDE, POTASSIUM CHLORIDE, SODIUM LACTATE AND CALCIUM CHLORIDE: 600; 310; 30; 20 INJECTION, SOLUTION INTRAVENOUS at 07:41

## 2024-04-17 RX ADMIN — ACETAMINOPHEN 1000 MG: 500 TABLET, FILM COATED ORAL at 06:02

## 2024-04-17 RX ADMIN — VANCOMYCIN HYDROCHLORIDE 1500 MG: 10 INJECTION, POWDER, LYOPHILIZED, FOR SOLUTION INTRAVENOUS at 19:38

## 2024-04-17 RX ADMIN — PROTAMINE SULFATE 200 MG: 10 INJECTION, SOLUTION INTRAVENOUS at 12:16

## 2024-04-17 RX ADMIN — SUFENTANIL CITRATE 23 MCG: 50 INJECTION EPIDURAL; INTRAVENOUS at 08:27

## 2024-04-17 RX ADMIN — MIDAZOLAM HYDROCHLORIDE 2 MG: 1 INJECTION, SOLUTION INTRAMUSCULAR; INTRAVENOUS at 06:28

## 2024-04-17 RX ADMIN — IPRATROPIUM BROMIDE AND ALBUTEROL SULFATE 3 ML: .5; 3 SOLUTION RESPIRATORY (INHALATION) at 14:24

## 2024-04-17 NOTE — SIGNIFICANT NOTE
04/17/24 1351   Readings   Plateau Pressure (cm H2O) 21 cm H2O   Driving Pressure (cm H2O) 11 cm H2O   Static Compliance (L/cm H2O) 53   Dynamic Compliance (L/cm H2O) 55 L/cm H2O

## 2024-04-17 NOTE — H&P
Since H&P in clinic has undergone full mouth extraction.  Coronary angiography without significant obstructive disease.  Long discussions with patient regarding valve type, he wants to proceed with mechanical valve replacement as part of the aortic root replacement.  He understands the need for life long anticoagulation with coumadin.  Discussed overall risks and benefits of surgery again with him, he understands and agrees to proceed.    Sandeep Duran M.D.  Cardiothoracic Surgeon    Cardiothoracic Surgery Consultation     Referring Physician: Dr. Kyle Villagran     Primary Care Physician: Dr. Jhon Pittman          Chief Complaint   Patient presents with    Aortic Aneurysm       New patient from Dr Villagran               Subjective  History of Present Illness     Nic Castro is a 48-year-old male who presents for evaluation of an aortic aneurysm. He is accompanied by his wife.     The patient reports that about 4 years ago, he was having some dizziness and had a strange sharp pain in his stomach. The abdominal pain did not last very long, but he still felt dizzy, so he went to the hospital. He was discovered to have an aortic aneurysm that was 3.5 cm, however they would not operate on them until they were 5.5 cm. He had another CT scan between then and the one he just had, and it showed the aortic aneurysm in his chest was changing.      He has received conflicting information about the size of his aortic aneurysm.      He denies chest pain but does sometimes feels tight in his chest.     He denies any heart problems before. He denies any stroke or mini stroke that he is aware of. He is otherwise healthy.     He denies any surgery on his heart, lungs, or chest.      He does not go to the dentist regularly. He has 6 teeth that could be of concern. He does not have dental insurance.     He has no known family history of aortic aneurysms.         He smokes about half a pack of cigarettes a day. He has been trying  to quit lately. He denies any illegal drug use.     He denies any family history of aortic aneurysm.        Review of Systems      A complete review of systems was performed, is negative except stated above.     Medical History        Past Medical History:   Diagnosis Date    Anxiety      Pancreatitis      Substance abuse           Surgical History         Past Surgical History:   Procedure Laterality Date    ANKLE LIGAMENT RECONSTRUCTION Left 2015         History reviewed. No pertinent family history.  Social History   Social History            Tobacco Use    Smoking status: Every Day       Packs/day: .5       Types: Cigarettes       Start date: 2003    Smokeless tobacco: Never   Substance Use Topics    Alcohol use: Not Currently    Drug use: Never         Current Medications          Current Outpatient Medications   Medication Sig Dispense Refill    ARIPiprazole (ABILIFY) 5 MG tablet Take 1 tablet by mouth Daily.        aspirin 81 MG EC tablet Take 1 tablet by mouth Daily.        atorvastatin (Lipitor) 10 MG tablet Take 1 tablet by mouth Daily. 90 tablet 0    busPIRone (BUSPAR) 15 MG tablet Take 1 tablet by mouth 3 (Three) Times a Day.        clonazePAM (KlonoPIN) 0.5 MG tablet Take 1 tablet by mouth Every 12 (Twelve) Hours.        diazePAM (VALIUM) 5 MG tablet Take 1 tablet by mouth Every 8 (Eight) Hours As Needed. for anxiety        folic acid (FOLVITE) 1 MG tablet          hydrOXYzine (ATARAX) 25 MG tablet Take 1 tablet by mouth 3 (Three) Times a Day As Needed.        naltrexone (DEPADE) 50 MG tablet Take 1 tablet by mouth Daily.        pantoprazole (PROTONIX) 40 MG EC tablet Take 1 tablet by mouth Daily.        PARoxetine (PAXIL) 40 MG tablet Take 1 tablet by mouth Daily.        prazosin (MINIPRESS) 2 MG capsule Take 1 capsule by mouth Every Night.        QUEtiapine (SEROquel) 100 MG tablet Take 1 tablet by mouth every night at bedtime.        sertraline (ZOLOFT) 100 MG tablet Take 2 tablets by mouth Daily.     "    SM Nicotine 14 MG/24HR patch Place 1 patch on the skin as directed by provider Daily.        traZODone (DESYREL) 100 MG tablet Take 1 tablet by mouth Daily.          No current facility-administered medications for this visit.         Allergies:  Patient has no known allergies.           Objective  Vital Signs  Visit Vitals  /82 (BP Location: Right arm, Patient Position: Sitting, Cuff Size: Adult)   Pulse 102   Ht 180.3 cm (71\")   Wt 86.6 kg (191 lb)   SpO2 98%   BMI 26.64 kg/m²            Physical Exam  Vitals and nursing note reviewed.   Constitutional:       Appearance: He is well-developed.   HENT:      Head: Normocephalic.   Neck:      Thyroid: No thyroid mass.      Vascular: No carotid bruit or JVD.      Trachea: Trachea and phonation normal.   Cardiovascular:      Rate and Rhythm: Normal rate and regular rhythm.      Pulses:           Radial pulses are 2+ on the right side and 2+ on the left side.        Posterior tibial pulses are 2+ on the right side and 2+ on the left side.      Heart sounds: Normal heart sounds. No murmur heard.     No friction rub. No gallop.   Pulmonary:      Effort: Pulmonary effort is normal. No respiratory distress.      Breath sounds: Normal breath sounds. No wheezing or rales.   Musculoskeletal:         General: No swelling. Normal range of motion.      Cervical back: Neck supple.   Skin:     General: Skin is warm and dry.      Capillary Refill: Capillary refill takes less than 2 seconds.      Findings: No rash.   Neurological:      Mental Status: He is alert and oriented to person, place, and time.   Psychiatric:         Speech: Speech normal.         Behavior: Behavior normal.         Thought Content: Thought content normal.         Judgment: Judgment normal.            Results Review:         WBC   Date Value Ref Range Status   12/21/2023 8.92 3.40 - 10.80 10*3/mm3 Final            RBC   Date Value Ref Range Status   12/21/2023 5.35 4.14 - 5.80 10*6/mm3 Final          "   Hemoglobin   Date Value Ref Range Status   12/21/2023 14.6 13.0 - 17.7 g/dL Final            Hematocrit   Date Value Ref Range Status   12/21/2023 45.5 37.5 - 51.0 % Final            MCV   Date Value Ref Range Status   12/21/2023 85.0 79.0 - 97.0 fL Final            MCH   Date Value Ref Range Status   12/21/2023 27.3 26.6 - 33.0 pg Final            MCHC   Date Value Ref Range Status   12/21/2023 32.1 31.5 - 35.7 g/dL Final            RDW   Date Value Ref Range Status   12/21/2023 13.2 12.3 - 15.4 % Final            RDW-SD   Date Value Ref Range Status   12/21/2023 40.3 37.0 - 54.0 fl Final            MPV   Date Value Ref Range Status   12/21/2023 9.5 6.0 - 12.0 fL Final            Platelets   Date Value Ref Range Status   12/21/2023 211 140 - 450 10*3/mm3 Final            Neutrophil %   Date Value Ref Range Status   12/21/2023 58.4 42.7 - 76.0 % Final            Lymphocyte %   Date Value Ref Range Status   12/21/2023 26.3 19.6 - 45.3 % Final            Monocyte %   Date Value Ref Range Status   12/21/2023 10.2 5.0 - 12.0 % Final            Eosinophil %   Date Value Ref Range Status   12/21/2023 4.0 0.3 - 6.2 % Final            Basophil %   Date Value Ref Range Status   12/21/2023 0.8 0.0 - 1.5 % Final            Immature Grans %   Date Value Ref Range Status   12/21/2023 0.3 0.0 - 0.5 % Final            Neutrophils, Absolute   Date Value Ref Range Status   12/21/2023 5.20 1.70 - 7.00 10*3/mm3 Final            Lymphocytes, Absolute   Date Value Ref Range Status   12/21/2023 2.35 0.70 - 3.10 10*3/mm3 Final            Monocytes, Absolute   Date Value Ref Range Status   12/21/2023 0.91 (H) 0.10 - 0.90 10*3/mm3 Final            Eosinophils, Absolute   Date Value Ref Range Status   12/21/2023 0.36 0.00 - 0.40 10*3/mm3 Final            Basophils, Absolute   Date Value Ref Range Status   12/21/2023 0.07 0.00 - 0.20 10*3/mm3 Final            Immature Grans, Absolute   Date Value Ref Range Status   12/21/2023 0.03 0.00 -  0.05 10*3/mm3 Final            nRBC   Date Value Ref Range Status   12/21/2023 0.0 0.0 - 0.2 /100 WBC Final            Glucose   Date Value Ref Range Status   12/21/2023 92 65 - 99 mg/dL Final            Sodium   Date Value Ref Range Status   12/21/2023 138 136 - 145 mmol/L Final   11/06/2023 139 135 - 145 mmol/L Final              Potassium   Date Value Ref Range Status   12/21/2023 4.3 3.5 - 5.2 mmol/L Final       Comment:       Slight hemolysis detected by analyzer. Result may be falsely elevated.   11/06/2023 3.7 3.5 - 5.0 mmol/L Final            CO2   Date Value Ref Range Status   12/21/2023 26.0 22.0 - 29.0 mmol/L Final            Total CO2   Date Value Ref Range Status   11/06/2023 29 21 - 32 mmol/L Final            Chloride   Date Value Ref Range Status   12/21/2023 102 98 - 107 mmol/L Final   11/06/2023 102 98 - 107 mmol/L Final            Anion Gap   Date Value Ref Range Status   12/21/2023 10.0 5.0 - 15.0 mmol/L Final   11/06/2023 8 6 - 16 mmol/L Final            Creatinine   Date Value Ref Range Status   12/21/2023 0.81 0.76 - 1.27 mg/dL Final   11/06/2023 0.92 0.60 - 1.30 mg/dL Final            BUN   Date Value Ref Range Status   12/21/2023 9 6 - 20 mg/dL Final   11/06/2023 10 7 - 18 mg/dL Final            BUN/Creatinine Ratio   Date Value Ref Range Status   12/21/2023 11.1 7.0 - 25.0 Final   11/06/2023 10.9 11.7 - 13.9 Final              Calcium   Date Value Ref Range Status   12/21/2023 9.4 8.6 - 10.5 mg/dL Final   11/06/2023 9.0 8.5 - 10.1 mg/dL Final       Comment:       Calcium measurements are adversely affected by the use of Omniscan during MRI. Analysis of calcium is not recommended for 12 to 24 hours after the use of the contrast agent.             eGFR Non  Am   Date Value Ref Range Status   11/06/2023 >60.0 >=60.0 mL/min/1.73m2 Final            Alkaline Phosphatase   Date Value Ref Range Status   12/21/2023 83 39 - 117 U/L Final   11/06/2023 62 50 - 136 U/L Final            Total  Protein   Date Value Ref Range Status   12/21/2023 7.3 6.0 - 8.5 g/dL Final   11/06/2023 7.2 6.4 - 8.2 g/dL Final            ALT (SGPT)   Date Value Ref Range Status   12/21/2023 61 (H) 1 - 41 U/L Final   11/06/2023 62 16 - 62 U/L Final            AST (SGOT)   Date Value Ref Range Status   12/21/2023 47 (H) 1 - 40 U/L Final   11/06/2023 37 (H) 7 - 34 U/L Final              Total Bilirubin   Date Value Ref Range Status   12/21/2023 0.5 0.0 - 1.2 mg/dL Final   11/06/2023 0.3 0.0 - 1.0 mg/dL Final       Comment:       Use of this assay is not recommended for patients undergoing treatment with eltrombopag due to the potential for falsely elevated results.            Albumin   Date Value Ref Range Status   12/21/2023 4.2 3.5 - 5.2 g/dL Final   11/06/2023 3.6 3.4 - 5.0 g/dL Final            Globulin   Date Value Ref Range Status   12/21/2023 3.1 gm/dL Final                     I reviewed the patient's clinical results and discussed with patient.     CT Chest:: CT chest with contrast performed on 11/06/2023.  FINDINGS:     Lung parenchyma and central airways: Normal.   Pleura: Normal.   Heart and great vessels: Heart size is normal. Some coronary artery   calcification is noted. There is also some scattered ossification of   the pericardium. There is significant enlargement of the ascending   thoracic aorta. The diameter of the thoracic aorta at the level of the   sinuses of Valsalva and sinotubular junction is about 5.4 cm. No   evidence of dissection. There are no prior exam for comparison. The   aorta tapers up to the aortic arch where it has a more normal caliber.   Caliber is also normal in descending thoracic aorta. Small amount of   calcification in some of the great vessels and coronary arteries.   Mediastinum and dana: Small amount of calcification in the mediastinum   consistent was some old granulomatous infection.   Chest wall: Normal.   Spine and other bones: Mild degenerative thoracic spondylosis.   Lower  neck: Normal.   Upper abdomen: Normal.      IMPRESSION:     1. Prominent aneurysmal enlargement of the ascending thoracic aorta.   No evidence of dissection or other acute change.   2. Atherosclerotic calcification and coronary arteries consistent with   coronary artery disease.   3. Some pericardial calcification is noted. This may result in   significant restricted diastolic dysfunction.      ECHO:     I personally reviewed images of following exams, the following is my interpretation:     CT Chest: CT chest shows severely dilated aneurysmal aortic root. I measured it to be 6.1 cm in maximum dimension extends to the level of the root. Aortic arch has a normal branching pattern and the aorta tapers to a 3.9 cm distal ascending/proximal arch. No dissection, IMH, or VANNA. Lungs appear normal.                 Assessment & Plan  Mr. Castro is a 48-year-old male who presents to me with aortic root and ascending aortic aneurysm. This measures 6.1 cm in maximum dimension. He has known about this for many years and per report back in 2019, it measured 5.6 cm. He was referred to Dr. Villagran and subsequently to me. He does not have any symptoms from this. He is a smoker. He has a history of pancreatitis, substance abuse, and anxiety, but he is sober and clean now. He has never had surgery on his heart, lungs, or chest.     I discussed with him and his wife today the natural history of aortic root aneurysms. He has not had an echocardiogram yet. I will obtain one to assess his valve anatomy and valve dysfunction, but I suspect bicuspid valve given the appearance of his aneurysm. We discussed treatment options, and I would recommend surgical replacement of his aorta given its large size at 6.1 cm. The risk of dissection is much higher than the risk of surgery at this point. We discussed preoperative, operative, postoperative expectations at length as well as the risk and benefits of surgery.      We discussed the risk of  surgery including but not limited to bleeding, infection, injury to major organs or vessels, chronic heart failure, arrhythmias, need for pacemaker, prolonged ventilation, renal failure, stroke, risk of anesthesia, risk of sternal wound or bone healing problems, reoperation, and/or death. He is going to try to get a dental appointment as soon as possible and if he is unable by next week, he will call and discuss this with us further. I will refer him to Dr. Nikolas Ramos for a cardiac catheterization given his smoking history and age. I discussed with him valve options of biologic versus mechanical and he wants to think about it. I will discuss this with him further prior to surgery.     I will complete his work-up and plan for surgery soon. Thank you for trusting me with the care of Mr. Castro. Please do not hesitate to call with questions or concerns.           Sandeep Duran MD  Cardiothoracic Surgeon

## 2024-04-17 NOTE — OP NOTE
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 Ascending aorta and aortic root replacement with valved conduit and coronary reconstruction, Mechanical-Bentall Procedure; CPT 59542  2. Aortic Hemiarch Replacement with beveled open distal anastomosis under arch vessels with circulatory arrest; CPT +39503     Procedure Summary:   Aortic Root Replacement with Mechanical Bentall (27/29 On-X Valved Conduit with 26mm Graft), Ascending Aortic Replacement with Hemiarch Replacement with 26mm Side Branched Dacron graft        Surgeon:  Sandeep Duran MD  Assistants:  Lynne Nguyen CFA  Anesthesia Staff:  Refugio Garcia MD  Anesthesia Type:  General     Estimated Blood Loss:  Minimal (Cell Saver)     Drains:  1. 24 Maur[place] Jeff drains x2-anterior and posterior mediastinum     Specimens:  Aorta and Aortic Valve     Operative Indications:   Mr. Castro     Operative Findings:        Very enlarged mid ascending aorta and aortic root, greater than 6cm in size  Bicuspid aortic valve with fusion of the left and right coronary cusp, Melyssa 1, mild calcification at the base of each leaflet    Two ostiums of coronary arteries in the right cusp, they were reimplanted as one large RCA button     Debrided to clean annulus.  Aortic root replacement with a On-X valved conduit, 27/29 On-X valve in a Valsalva graft with 26 mm graft        Ascending aorta and hemiarch replacement with a beveled distal anastomosis underneath head vessels with circulatory arrest with retrograde cerebral perfusion, 26 mm side branched Dacron graft used      Transesophageal echocardiography showed normal LV and RV function beginning the case with bicuspid aortic valve with without significant  valvular dysfunction, no significant MR.  At end of case well-seated aortic valve normal LV and RV function     Total Circulatory Arrest time (with RCP): 15 minutes   Total aortic cross-clamp time: 140 minutes  Total cardiac bypass time: 188 minutes     Operative description in detail:  The patient was taken to the operative suite where he was placed in a supine position.  Induction of general anesthesia and placement of a single-lumen endotracheal tube was performed without remark.  Appropriate arterial and venous access was established without remark.  A right IJ central venous catheter was placed followed by a [place] pulmonary artery catheter by anesthesia staff. The patient was then prepped and draped in the usual and sterile surgical fashion.  A timeout was performed.  Perioperative antibiotics were administered. Beta blocker was given.     Median sternotomy was performed in standard fashion. Hemostasis was ensured along sternal edges.  Midline mediastinal fat and thymus were divided and pericardium opened.  A pericardial well was created.  The distal ascending aorta and right atrial appendage were cannulated for cardiopulmonary bypass standard fashion.  Aortic line was tested and was satisfactory.  The SVC was cannulated for retrograde cerebral perfusion in standard fashion.  A right superior pulmonary vein LV vent was placed in standard fashion.  A combined cardioplegia/aortic root vent set was secured with a horizontal mattress 4-0 Prolene suture.  Retrograde cardioplegia catheter was inserted into the coronary sinus to the free wall the right atrium.  With an appropriate ACT cardiopulmonary bypass was commenced.        Cooling was begun with a goal temperature of 22°C.  During cooling the ascending aorta was dissected out further until it was free from the pulmonary artery and arch vessels were identified.  With that, I proceeded to apply the aortic cross-clamp and administered cardioplegia utilizing standard  cardioplegia in a retrograde fashion.  I then opened the aorta and placed a sutures at the top of each commissure and directly gave ostial coronary cardioplegia.  With this there was prompt diastolic arrest.  Total standard dose was given.  Cardioplegia was given every 15-20 mins via retrograde, antegrade and direct coronary administration.     I then began dissection of the aortic root.  The valve was inspected and was per above findings.  The aortic valve was excised in its entirety.  I then sharply developed left and right coronary buttons and tagged the buttons with pledgeted 4-0 Prolene sutures at the top.  The aortic root was then carefully dissected out down to the level of the annulus.  With this being achieved the patient's temperature was near 22 degrees and appropriate level for circulatory arrest.  An additional dose of cardioplegia was given.     Propofol was bolused until the BIS monitor had a reading of 0.  The head was packed in ice.  Steroids were given.  The cardiopulmonary bypass circuit was stopped aortic cannula removed and the cross-clamp removed.  We began RCP perfusion at 10 mL/min/kg.  The aorta was trimmed up to the previously marked hemiarch position.  The 26 mm branched Dacron graft was beveled and then trimmed with an appropriate bevel for the distal anastomosis.  Distal anastomosis was constructed with a running 3-0 Prolene suture.  After completion of the anastomosis the aortic arch and graft was de-aired.  Cardiopulmonary bypass was resumed at full flow through the sidebranch of the ascending aortic graft.  Hemostasis was ensured along the distal anastomosis.  Suture line was reinforced with BioGlue.  After 5 minutes rewarming was begun.     We then returned attention to the aortic root.  The annulus was measured and measured to easily fit a 27/29 mm On-X valve.  Valved conduit was opened.  The aortic annulus had horizontal mattress 2-0 Tycron sutures placed around its entirety with  pledgets on the ventricular side.  The sutures were then passed through the mechanical valved graft conduit.  It was seated and secured in place with cor knot suture securement.  The aortic root graft was trimmed.  We then measured the left button and marked its appropriate position on the graft.  Coronary button hole was created with eye cautery, left coronary was anastomosed to the aortic valve conduit with a running 5-0 Prolene suture.  We then turned our attention to the right coronary button, it was measured to appropriate position and marked on the aortic root graft.  Coronary buttonhole was created with eye cautery, right coronary was anastomosed to the aortic valve conduit with a running 5-0 Prolene suture.  The aortic root was test pressurized and there was adequate hemostasis at both buttons and a dose of cardioplegia was given.  Bioglue was used to reinforce the left coronary button suture lines.     I then measured the ascending aortic graft and aortic root graft to appropriate length and trimmed appropriately.  A graft to graft anastomosis was created with a running 4-0 Prolene suture.  With that being accomplished aortic root vent was secured in place just above the graft to graft anastomosis.  I did reinforce the suture line with Bio-Glue.     With that being accomplished, a terminal hotshot was administered.  The patient was placed in trendelenburg position.  Upon completion of terminal hotshot and placement of temporary epicardial pacing wires, with the aortic vent on high and pump flows diminished, the aortic cross-clamp was released.  With that, full support was implemented.  A nonworking beating phase was implemented.  Ventilation restored.  The retrograde cardioplegia catheter was removed and site oversewn as a matter of routine.       While on cardiopulmonary bypass, vent in the right superior pulmonary vein was removed and there was adequate hemostasis.  With all in readiness, the heart was  allowed to fill and then weaned off cardiopulmonary bypass.  He  from bypass well without problem.  With this there was excellent hemostasis.  We removed the aortic root vent/cardioplegia cannula set once we ensured no intracardiac air on echocardiogram.  Its associated pursestring suture was tied securely.  I decannulated both venous lines and snared down their associated pursestring sutures.  Systemic intravenous protamine was administered.  All associated blood volume was returned to the patient.  With continued good hemodynamics, I divided the arterial line by securing suture around the sidebranch of the aortic graft.  At this time I tied down the previously snared venous pursestring suture.  The mediastinum was drained with 24 Hungarian Jeff drains placed anteriorly and posteriorly.  I surveyed the chest and hemostasis was pristine.  The sternum was reapproximated with stainless sterile wires placed in an interrupted fashion.  In layers anatomically the soft tissue planes were reapproximated. Instruments, sharps, and sponge counts were reported as correct.      Complications: None     Disposition: Transferred to ICU in stable and guarded condition.     Sandeep Duran M.D.  Cardiothoracic Surgeon

## 2024-04-17 NOTE — ANESTHESIA PROCEDURE NOTES
Arterial Line    Pre-sedation assessment completed: 4/17/2024 6:44 AM    Patient reassessed immediately prior to procedure    Patient location during procedure: pre-op  Start time: 4/17/2024 6:44 AM  Stop Time:4/17/2024 6:44 AM       Line placed for hemodynamic monitoring.  Performed By   Anesthesiologist: Refugio Du MD   Preanesthetic Checklist  Completed: patient identified, IV checked, site marked, risks and benefits discussed, surgical consent, monitors and equipment checked, pre-op evaluation and timeout performed  Arterial Line Prep    Sterile Tech: cap, gloves and mask  Prep: ChloraPrep  Patient monitoring: blood pressure monitoring, continuous pulse oximetry and EKG  Arterial Line Procedure   Laterality:left  Location:  radial artery  Catheter size: 20 G   Guidance: ultrasound guided  PROCEDURE NOTE/ULTRASOUND INTERPRETATION.  Using ultrasound guidance the potential vascular sites for insertion of the catheter were visualized to determine the patency of the vessel to be used for vascular access.  After selecting the appropriate site for insertion, the needle was visualized under ultrasound being inserted into the radial artery, followed by ultrasound confirmation of wire and catheter placement. There were no abnormalities seen on ultrasound; an image was taken; and the patient tolerated the procedure with no complications.   Number of attempts: 1  Successful placement: yes Images: still images not obtained  Post Assessment   Dressing Type: occlusive dressing applied, secured with tape and wrist guard applied.   Complications no  Circ/Move/Sens Assessment: normal.   Patient Tolerance: patient tolerated the procedure well with no apparent complications  Additional Notes  Wire intact. Calibrated/Connections checked, line flushed.  Appropriate waveform. Clear occlusive opsite applied over catheter insertion site. Pt remained hemodynamically stable throughout procedure.

## 2024-04-17 NOTE — ANESTHESIA PROCEDURE NOTES
Airway  Urgency: elective    Date/Time: 4/17/2024 7:27 AM  Airway not difficult    General Information and Staff    Patient location during procedure: OR  CRNA/CAA: Danny Izaguirre CRNA    Indications and Patient Condition  Indications for airway management: airway protection    Preoxygenated: yes  Mask difficulty assessment: 1 - vent by mask    Final Airway Details  Final airway type: endotracheal airway      Successful airway: ETT  Cuffed: yes   Successful intubation technique: direct laryngoscopy  Endotracheal tube insertion site: oral  Blade: Sparkle  Blade size: 3.5  ETT size (mm): 8.0  Cormack-Lehane Classification: grade IIa - partial view of glottis  Placement verified by: chest auscultation and capnometry   Cuff volume (mL): 8  Measured from: teeth  ETT/EBT  to teeth (cm): 22  Number of attempts at approach: 1  Assessment: lips, teeth, and gum same as pre-op and atraumatic intubation    Additional Comments  SRNA MARTÍNEZ Wilburn

## 2024-04-17 NOTE — ANESTHESIA POSTPROCEDURE EVALUATION
"Patient: Nic Castro    Procedure Summary       Date: 04/17/24 Room / Location:  [facility] OR  /  [facility] OR    Anesthesia Start: 0715 Anesthesia Stop: 1346    Procedure: AORTIC ROOT REPLACEMENT, ASCENDING AORTIC HEMIARCH REPLACEMENT, AORTIC VALVE REPLACEMENT WITH MECHANICAL VALVE WITH CIRCULATORY ARREST, TRANSESOPHAGEAL ECHOCARDIOGRAM (Chest) Diagnosis:       Aneurysm of the ascending aorta, without rupture      (Aneurysm of the ascending aorta, without rupture [I71.21])    Surgeons: Sandeep Duran MD Provider: Danny Izaguirre CRNA    Anesthesia Type: general, Geraldine, CVL, PAC ASA Status: 4            Anesthesia Type: general, Geraldine, CVL, PAC    Vitals  Vitals Value Taken Time   /84 04/17/24 1346   Temp 97 °F (36.1 °C) 04/17/24 1343   Pulse 66 04/17/24 1346   Resp 20 04/17/24 1343   SpO2 100 % 04/17/24 1346   Vitals shown include unfiled device data.        Post Anesthesia Care and Evaluation    Patient location during evaluation: ICU  Patient participation: complete - patient cannot participate  Level of consciousness: obtunded/minimal responses  Pain score: 0  Pain management: adequate    Airway patency: patent  Anesthetic complications: No anesthetic complications  PONV Status: none  Cardiovascular status: acceptable  Respiratory status: acceptable, ETT and ventilator  Hydration status: acceptable    Comments: Blood pressure 138/89, pulse 66, temperature 97 °F (36.1 °C), temperature source Core, resp. rate 20, height 179 cm (70.47\"), weight 91.9 kg (202 lb 9.6 oz), SpO2 100%.      "

## 2024-04-17 NOTE — PROGRESS NOTES
"Pharmacy Dosing Service  Pharmacokinetics  Vancomycin Initial Evaluation  Assessment/Action/Plan:  Pre-op dose: 1500 mg   Current Order: Vancomycin 1500 mg IVPB every 12 hours  Current end date:4/18/2024  Levels: no levels ordered  Additional systemic antimicrobial agent(s): none    Vancomycin dosage initiated based on population pharmacokinetic parameters. Pharmacy will continue to follow daily and adjust dose accordingly.     Subjective:  Nic Castro is a 48 y.o. male with a Vancomycin \"Pharmacy to Dose\" consult for the treatment of surgical prophylaxis , day 1 of 2 of treatment.    AUC Model Data:  Regimen: 1500 mg IV every 12 hours.  Start time: 19:30 on 04/17/2024  Exposure target: AUC24 (range)400-600 mg/L.hr   AUC24,ss: 552 mg/L.hr  PAUC*: 80 %  Ctrough,ss: 16.5 mg/L  Pconc*: 36 %  Tox.: 12 %      Objective:  Ht: 179 cm (70.47\"); Wt: 91.9 kg (202 lb 9.6 oz)  Estimated Creatinine Clearance: 137.2 mL/min (A) (by C-G formula based on SCr of 0.74 mg/dL (L)).   Creatinine   Date Value Ref Range Status   04/15/2024 0.74 (L) 0.76 - 1.27 mg/dL Final   12/29/2023 0.83 0.76 - 1.27 mg/dL Final   12/21/2023 0.81 0.76 - 1.27 mg/dL Final   11/06/2023 0.92 0.60 - 1.30 mg/dL Final      Lab Results   Component Value Date    WBC 15.37 (H) 04/17/2024    WBC 7.26 04/15/2024    WBC 10.56 12/29/2023      Baseline culture results:  Microbiology Results (last 10 days)       ** No results found for the last 240 hours. **            Jersey Valente, PharmD  04/17/24 13:45 CDT    "

## 2024-04-17 NOTE — ANESTHESIA PROCEDURE NOTES
Central Line    Pre-sedation assessment completed: 4/17/2024 7:29 AM    Patient reassessed immediately prior to procedure    Patient location during procedure: OR  Start time: 4/17/2024 7:29 AM  Stop Time:4/17/2024 7:35 AM  Indications: vascular access  Staff  Anesthesiologist: Refugio Du MD  Preanesthetic Checklist  Completed: patient identified, IV checked, site marked, risks and benefits discussed, surgical consent, monitors and equipment checked, pre-op evaluation and timeout performed  Central Line Prep  Sterile Tech:cap, gloves, mask, sterile barriers and gown  Prep: chloraprep  no medical exclusion documented for following all elements of maximal sterile barrier technique  Patient monitoring: blood pressure monitoring, continuous pulse oximetry and EKG  Central Line Procedure  Laterality:right  Location:internal jugular  Catheter Type:MAC and [place]  Catheter Size:9 Fr  Guidance:ultrasound guided  PROCEDURE NOTE/ULTRASOUND INTERPRETATION.  Using ultrasound guidance the potential vascular sites for insertion of the catheter were visualized to determine the patency of the vessel to be used for vascular access.  After selecting the appropriate site for insertion, the needle was visualized under ultrasound being inserted into the internal jugular vein, followed by ultrasound confirmation of wire and catheter placement. There were no abnormalities seen on ultrasound; an image was taken; and the patient tolerated the procedure with no complications. Images: still images not obtained  Assessment  Post procedure:occlusive dressing applied, line sutured and biopatch applied  Assessement:blood return through all ports, free fluid flow, chest x-ray ordered and Nawaf Test  Complications:no  Patient Tolerance:patient tolerated the procedure well with no apparent complications  Additional Notes  PCW 55

## 2024-04-17 NOTE — PROGRESS NOTES
RT EQUIPMENT DEVICE RELATED - SKIN ASSESSMENT    Tam Score:        RT Medical Equipment/Device:     ETT [place]/Anchorfast    Skin Assessment:      Cheek:  Intact  Lips:  Intact  Mouth:  Intact    Device Skin Pressure Protection:  Skin-to-device areas padded:  Anchorfast    Nurse Notification:  Alexandra Raymond, RRT

## 2024-04-17 NOTE — ANESTHESIA PROCEDURE NOTES
Intra-Op Anesthesia MADISYN    Procedure Performed: Intra-Op Anesthesia MADISYN       Start Time:  4/17/2024 7:55 AM       End Time:   4/17/2024 7:55 AM    Preanesthesia Checklist:  Patient identified, IV assessed, risks and benefits discussed, monitors and equipment assessed, procedure being performed at surgeon's request and anesthesia consent obtained.    General Procedure Information  MADISYN Placed for monitoring purposes only -- This is not a diagnostic MADISYN

## 2024-04-18 ENCOUNTER — APPOINTMENT (OUTPATIENT)
Dept: GENERAL RADIOLOGY | Facility: HOSPITAL | Age: 49
DRG: 221 | End: 2024-04-18
Payer: MEDICAID

## 2024-04-18 LAB
ALBUMIN SERPL-MCNC: 4.3 G/DL (ref 3.5–5.2)
ANION GAP SERPL CALCULATED.3IONS-SCNC: 9 MMOL/L (ref 5–15)
BASOPHILS # BLD AUTO: 0.02 10*3/MM3 (ref 0–0.2)
BASOPHILS NFR BLD AUTO: 0.1 % (ref 0–1.5)
BH BB BLOOD EXPIRATION DATE: NORMAL
BH BB BLOOD EXPIRATION DATE: NORMAL
BH BB BLOOD TYPE BARCODE: 5100
BH BB BLOOD TYPE BARCODE: 6200
BH BB DISPENSE STATUS: NORMAL
BH BB DISPENSE STATUS: NORMAL
BH BB PRODUCT CODE: NORMAL
BH BB PRODUCT CODE: NORMAL
BH BB UNIT NUMBER: NORMAL
BH BB UNIT NUMBER: NORMAL
BUN SERPL-MCNC: 15 MG/DL (ref 6–20)
BUN/CREAT SERPL: 20.3 (ref 7–25)
CA-I BLD-MCNC: 4.85 MG/DL (ref 4.6–5.4)
CALCIUM SPEC-SCNC: 8.7 MG/DL (ref 8.6–10.5)
CHLORIDE SERPL-SCNC: 105 MMOL/L (ref 98–107)
CO2 SERPL-SCNC: 24 MMOL/L (ref 22–29)
CREAT SERPL-MCNC: 0.74 MG/DL (ref 0.76–1.27)
DEPRECATED RDW RBC AUTO: 46.7 FL (ref 37–54)
EGFRCR SERPLBLD CKD-EPI 2021: 110.4 ML/MIN/1.73
EOSINOPHIL # BLD AUTO: 0 10*3/MM3 (ref 0–0.4)
EOSINOPHIL NFR BLD AUTO: 0 % (ref 0.3–6.2)
ERYTHROCYTE [DISTWIDTH] IN BLOOD BY AUTOMATED COUNT: 15.1 % (ref 12.3–15.4)
GLUCOSE BLDC GLUCOMTR-MCNC: 123 MG/DL (ref 70–130)
GLUCOSE BLDC GLUCOMTR-MCNC: 128 MG/DL (ref 70–130)
GLUCOSE BLDC GLUCOMTR-MCNC: 143 MG/DL (ref 70–130)
GLUCOSE BLDC GLUCOMTR-MCNC: 152 MG/DL (ref 70–130)
GLUCOSE BLDC GLUCOMTR-MCNC: 157 MG/DL (ref 70–130)
GLUCOSE BLDC GLUCOMTR-MCNC: 158 MG/DL (ref 70–130)
GLUCOSE SERPL-MCNC: 121 MG/DL (ref 65–99)
HCT VFR BLD AUTO: 29.5 % (ref 37.5–51)
HGB BLD-MCNC: 9.5 G/DL (ref 13–17.7)
INR PPP: 1.18 (ref 0.91–1.09)
LYMPHOCYTES # BLD AUTO: 1.09 10*3/MM3 (ref 0.7–3.1)
LYMPHOCYTES NFR BLD AUTO: 7.2 % (ref 19.6–45.3)
Lab: NORMAL
MAGNESIUM SERPL-MCNC: 1.7 MG/DL (ref 1.6–2.6)
MCH RBC QN AUTO: 27.4 PG (ref 26.6–33)
MCHC RBC AUTO-ENTMCNC: 32.2 G/DL (ref 31.5–35.7)
MCV RBC AUTO: 85 FL (ref 79–97)
MONOCYTES # BLD AUTO: 1.2 10*3/MM3 (ref 0.1–0.9)
MONOCYTES NFR BLD AUTO: 7.9 % (ref 5–12)
NEUTROPHILS NFR BLD AUTO: 12.84 10*3/MM3 (ref 1.7–7)
NEUTROPHILS NFR BLD AUTO: 84.5 % (ref 42.7–76)
PHOSPHATE SERPL-MCNC: 3.7 MG/DL (ref 2.5–4.5)
PLATELET # BLD AUTO: 132 10*3/MM3 (ref 140–450)
PMV BLD AUTO: 9.9 FL (ref 6–12)
POTASSIUM SERPL-SCNC: 4.7 MMOL/L (ref 3.5–5.2)
PROTHROMBIN TIME: 15.5 SECONDS (ref 11.8–14.8)
QT INTERVAL: 398 MS
QT INTERVAL: 454 MS
QTC INTERVAL: 478 MS
QTC INTERVAL: 482 MS
RBC # BLD AUTO: 3.47 10*6/MM3 (ref 4.14–5.8)
SODIUM SERPL-SCNC: 138 MMOL/L (ref 136–145)
UNIT  ABO: NORMAL
UNIT  ABO: NORMAL
UNIT  RH: NORMAL
UNIT  RH: NORMAL
WBC NRBC COR # BLD AUTO: 15.2 10*3/MM3 (ref 3.4–10.8)

## 2024-04-18 PROCEDURE — 25010000002 ENOXAPARIN PER 10 MG: Performed by: SURGERY

## 2024-04-18 PROCEDURE — 97116 GAIT TRAINING THERAPY: CPT

## 2024-04-18 PROCEDURE — P9041 ALBUMIN (HUMAN),5%, 50ML: HCPCS | Performed by: SURGERY

## 2024-04-18 PROCEDURE — 83735 ASSAY OF MAGNESIUM: CPT | Performed by: SURGERY

## 2024-04-18 PROCEDURE — 71045 X-RAY EXAM CHEST 1 VIEW: CPT

## 2024-04-18 PROCEDURE — 94799 UNLISTED PULMONARY SVC/PX: CPT

## 2024-04-18 PROCEDURE — 85610 PROTHROMBIN TIME: CPT | Performed by: SURGERY

## 2024-04-18 PROCEDURE — 82948 REAGENT STRIP/BLOOD GLUCOSE: CPT

## 2024-04-18 PROCEDURE — 94664 DEMO&/EVAL PT USE INHALER: CPT

## 2024-04-18 PROCEDURE — 80069 RENAL FUNCTION PANEL: CPT | Performed by: SURGERY

## 2024-04-18 PROCEDURE — 25010000002 VANCOMYCIN 10 G RECONSTITUTED SOLUTION: Performed by: SURGERY

## 2024-04-18 PROCEDURE — 25010000002 ALBUMIN HUMAN 5% PER 50 ML: Performed by: SURGERY

## 2024-04-18 PROCEDURE — 85025 COMPLETE CBC W/AUTO DIFF WBC: CPT | Performed by: SURGERY

## 2024-04-18 PROCEDURE — 93005 ELECTROCARDIOGRAM TRACING: CPT | Performed by: SURGERY

## 2024-04-18 PROCEDURE — 25810000003 SODIUM CHLORIDE 0.9 % SOLUTION: Performed by: SURGERY

## 2024-04-18 PROCEDURE — 25010000002 MAGNESIUM SULFATE 4 GM/100ML SOLUTION: Performed by: SURGERY

## 2024-04-18 PROCEDURE — 82330 ASSAY OF CALCIUM: CPT

## 2024-04-18 PROCEDURE — 97162 PT EVAL MOD COMPLEX 30 MIN: CPT

## 2024-04-18 PROCEDURE — 93010 ELECTROCARDIOGRAM REPORT: CPT | Performed by: INTERNAL MEDICINE

## 2024-04-18 PROCEDURE — 94761 N-INVAS EAR/PLS OXIMETRY MLT: CPT

## 2024-04-18 RX ORDER — QUETIAPINE FUMARATE 100 MG/1
100 TABLET, FILM COATED ORAL NIGHTLY
Status: DISCONTINUED | OUTPATIENT
Start: 2024-04-18 | End: 2024-04-23 | Stop reason: HOSPADM

## 2024-04-18 RX ORDER — MAGNESIUM SULFATE HEPTAHYDRATE 40 MG/ML
4 INJECTION, SOLUTION INTRAVENOUS ONCE
Status: COMPLETED | OUTPATIENT
Start: 2024-04-18 | End: 2024-04-18

## 2024-04-18 RX ORDER — WARFARIN SODIUM 5 MG/1
10 TABLET ORAL
Status: COMPLETED | OUTPATIENT
Start: 2024-04-18 | End: 2024-04-18

## 2024-04-18 RX ORDER — METHOCARBAMOL 500 MG/1
500 TABLET, FILM COATED ORAL EVERY 8 HOURS SCHEDULED
Status: DISCONTINUED | OUTPATIENT
Start: 2024-04-18 | End: 2024-04-23 | Stop reason: HOSPADM

## 2024-04-18 RX ADMIN — OXYCODONE HYDROCHLORIDE 10 MG: 10 TABLET ORAL at 19:41

## 2024-04-18 RX ADMIN — ACETAMINOPHEN 650 MG: 325 TABLET, FILM COATED ORAL at 05:11

## 2024-04-18 RX ADMIN — METOPROLOL TARTRATE 12.5 MG: 25 TABLET, FILM COATED ORAL at 08:08

## 2024-04-18 RX ADMIN — IPRATROPIUM BROMIDE AND ALBUTEROL SULFATE 3 ML: .5; 3 SOLUTION RESPIRATORY (INHALATION) at 06:33

## 2024-04-18 RX ADMIN — PANTOPRAZOLE SODIUM 40 MG: 40 TABLET, DELAYED RELEASE ORAL at 05:11

## 2024-04-18 RX ADMIN — SERTRALINE HYDROCHLORIDE 200 MG: 100 TABLET, FILM COATED ORAL at 08:08

## 2024-04-18 RX ADMIN — WARFARIN SODIUM 10 MG: 5 TABLET ORAL at 17:17

## 2024-04-18 RX ADMIN — CHLORHEXIDINE GLUCONATE 0.12% ORAL RINSE 15 ML: 1.2 LIQUID ORAL at 17:17

## 2024-04-18 RX ADMIN — ALBUMIN (HUMAN) 500 ML: 12.5 INJECTION, SOLUTION INTRAVENOUS at 08:09

## 2024-04-18 RX ADMIN — OXYCODONE HYDROCHLORIDE 5 MG: 5 TABLET ORAL at 04:31

## 2024-04-18 RX ADMIN — BISACODYL 10 MG: 5 TABLET, COATED ORAL at 20:18

## 2024-04-18 RX ADMIN — Medication 1 APPLICATION: at 05:11

## 2024-04-18 RX ADMIN — QUETIAPINE FUMARATE 100 MG: 100 TABLET ORAL at 20:18

## 2024-04-18 RX ADMIN — VANCOMYCIN HYDROCHLORIDE 1500 MG: 10 INJECTION, POWDER, LYOPHILIZED, FOR SOLUTION INTRAVENOUS at 19:31

## 2024-04-18 RX ADMIN — METHOCARBAMOL 500 MG: 500 TABLET, FILM COATED ORAL at 14:17

## 2024-04-18 RX ADMIN — ARIPIPRAZOLE 5 MG: 5 TABLET ORAL at 08:08

## 2024-04-18 RX ADMIN — CHLORHEXIDINE GLUCONATE 0.12% ORAL RINSE 15 ML: 1.2 LIQUID ORAL at 05:11

## 2024-04-18 RX ADMIN — ATORVASTATIN CALCIUM 20 MG: 10 TABLET, FILM COATED ORAL at 20:18

## 2024-04-18 RX ADMIN — VANCOMYCIN HYDROCHLORIDE 1500 MG: 10 INJECTION, POWDER, LYOPHILIZED, FOR SOLUTION INTRAVENOUS at 08:08

## 2024-04-18 RX ADMIN — CLONAZEPAM 0.5 MG: 0.5 TABLET ORAL at 08:07

## 2024-04-18 RX ADMIN — CALCIUM CHLORIDE 1000 MG: 100 INJECTION INTRAVENOUS; INTRAVENTRICULAR at 09:22

## 2024-04-18 RX ADMIN — METHOCARBAMOL 500 MG: 500 TABLET, FILM COATED ORAL at 22:18

## 2024-04-18 RX ADMIN — MAGNESIUM SULFATE HEPTAHYDRATE 4 G: 40 INJECTION, SOLUTION INTRAVENOUS at 03:52

## 2024-04-18 RX ADMIN — METOPROLOL TARTRATE 12.5 MG: 25 TABLET, FILM COATED ORAL at 20:18

## 2024-04-18 RX ADMIN — CHLORHEXIDINE GLUCONATE 1 APPLICATION: 500 CLOTH TOPICAL at 04:18

## 2024-04-18 RX ADMIN — BISACODYL 10 MG: 5 TABLET, COATED ORAL at 08:08

## 2024-04-18 RX ADMIN — ACETAMINOPHEN 650 MG: 325 TABLET, FILM COATED ORAL at 23:50

## 2024-04-18 RX ADMIN — Medication 1 APPLICATION: at 17:17

## 2024-04-18 RX ADMIN — ASPIRIN 162 MG: 81 TABLET, CHEWABLE ORAL at 08:08

## 2024-04-18 RX ADMIN — ACETAMINOPHEN 650 MG: 325 TABLET, FILM COATED ORAL at 11:02

## 2024-04-18 RX ADMIN — METHOCARBAMOL 500 MG: 500 TABLET, FILM COATED ORAL at 08:08

## 2024-04-18 RX ADMIN — OXYCODONE HYDROCHLORIDE 10 MG: 10 TABLET ORAL at 14:58

## 2024-04-18 RX ADMIN — OXYCODONE HYDROCHLORIDE 10 MG: 10 TABLET ORAL at 08:21

## 2024-04-18 RX ADMIN — POLYETHYLENE GLYCOL 3350 17 G: 17 POWDER, FOR SOLUTION ORAL at 08:08

## 2024-04-18 RX ADMIN — ENOXAPARIN SODIUM 40 MG: 100 INJECTION SUBCUTANEOUS at 08:08

## 2024-04-18 RX ADMIN — ACETAMINOPHEN 650 MG: 325 TABLET, FILM COATED ORAL at 17:17

## 2024-04-18 RX ADMIN — CLONAZEPAM 0.5 MG: 0.5 TABLET ORAL at 20:18

## 2024-04-18 NOTE — PLAN OF CARE
Problem: Adult Inpatient Plan of Care  Goal: Plan of Care Review  Outcome: Ongoing, Progressing  Goal: Patient-Specific Goal (Individualized)  Outcome: Ongoing, Progressing  Goal: Absence of Hospital-Acquired Illness or Injury  Outcome: Ongoing, Progressing  Intervention: Identify and Manage Fall Risk  Recent Flowsheet Documentation  Taken 4/18/2024 0544 by Amarjit Perez RN  Safety Promotion/Fall Prevention: safety round/check completed  Taken 4/18/2024 0500 by Amarjit Perez RN  Safety Promotion/Fall Prevention: safety round/check completed  Taken 4/18/2024 0400 by Amarjit Perez RN  Safety Promotion/Fall Prevention: safety round/check completed  Taken 4/18/2024 0300 by Amarjit Perez RN  Safety Promotion/Fall Prevention: safety round/check completed  Taken 4/18/2024 0200 by Amarjit Perez RN  Safety Promotion/Fall Prevention: safety round/check completed  Taken 4/18/2024 0100 by Amarjit Perez RN  Safety Promotion/Fall Prevention: safety round/check completed  Taken 4/18/2024 0000 by Amarjit Perez RN  Safety Promotion/Fall Prevention: safety round/check completed  Taken 4/17/2024 2339 by Amarjit Perez RN  Safety Promotion/Fall Prevention: toileting scheduled  Taken 4/17/2024 2300 by Amarjit Perez RN  Safety Promotion/Fall Prevention: safety round/check completed  Taken 4/17/2024 2200 by Amarjit Perez RN  Safety Promotion/Fall Prevention: safety round/check completed  Taken 4/17/2024 2100 by Amarjit Perez RN  Safety Promotion/Fall Prevention: safety round/check completed  Taken 4/17/2024 1942 by Amarjit Perez RN  Safety Promotion/Fall Prevention:   safety round/check completed   fall prevention program maintained   clutter free environment maintained   assistive device/personal items within reach  Taken 4/17/2024 1900 by Amarjit Perez RN  Safety Promotion/Fall Prevention: safety round/check completed  Intervention: Prevent Skin Injury  Recent Flowsheet Documentation  Taken 4/18/2024 0400 by Amarjit Perez RN  Body  Position:   upper extremity elevated   legs elevated  Taken 4/17/2024 2339 by Amarjit Perez RN  Body Position:   supine   upper extremity elevated  Taken 4/17/2024 1942 by Amarjit Perez RN  Body Position:   supine   upper extremity elevated  Intervention: Prevent and Manage VTE (Venous Thromboembolism) Risk  Recent Flowsheet Documentation  Taken 4/18/2024 0400 by Amarjit Perez, RN  Activity Management: up in chair  VTE Prevention/Management:   bilateral   sequential compression devices on  Taken 4/17/2024 2339 by Amarjit Perez RN  Activity Management: bedrest  VTE Prevention/Management:   bilateral   sequential compression devices on  Taken 4/17/2024 1942 by Amarjit Perez RN  VTE Prevention/Management:   bilateral   sequential compression devices on  Goal: Optimal Comfort and Wellbeing  Outcome: Ongoing, Progressing  Intervention: Monitor Pain and Promote Comfort  Recent Flowsheet Documentation  Taken 4/18/2024 0400 by Amarjit Perez RN  Pain Management Interventions: see MAR  Taken 4/17/2024 2339 by Amajrit Perez RN  Pain Management Interventions: see MAR  Intervention: Provide Person-Centered Care  Recent Flowsheet Documentation  Taken 4/18/2024 0400 by Amarjit Perez RN  Trust Relationship/Rapport: care explained  Taken 4/17/2024 2339 by Amarjit Perez RN  Trust Relationship/Rapport: care explained  Taken 4/17/2024 1942 by Amarjit Perez RN  Trust Relationship/Rapport: care explained  Goal: Readiness for Transition of Care  Outcome: Ongoing, Progressing  Intervention: Mutually Develop Transition Plan  Recent Flowsheet Documentation  Taken 4/17/2024 2017 by Amarjit Perez RN  Transportation Anticipated: family or friend will provide  Patient/Family Anticipated Services at Transition: none  Patient/Family Anticipates Transition to: home  Taken 4/17/2024 2015 by Amarjit Perez, RN  Equipment Currently Used at Home: bp cuff     Problem: Communication Impairment (Mechanical Ventilation, Invasive)  Goal: Effective  Communication  Outcome: Ongoing, Progressing     Problem: Device-Related Complication Risk (Mechanical Ventilation, Invasive)  Goal: Optimal Device Function  Outcome: Ongoing, Progressing     Problem: Inability to Wean (Mechanical Ventilation, Invasive)  Goal: Mechanical Ventilation Liberation  Outcome: Ongoing, Progressing  Intervention: Promote Extubation and Mechanical Ventilation Liberation  Recent Flowsheet Documentation  Taken 4/17/2024 1942 by Amarjit Perez RN  Medication Review/Management: medications reviewed     Problem: Skin and Tissue Injury (Mechanical Ventilation, Invasive)  Goal: Absence of Device-Related Skin and Tissue Injury  Outcome: Ongoing, Progressing     Problem: Ventilator-Induced Lung Injury (Mechanical Ventilation, Invasive)  Goal: Absence of Ventilator-Induced Lung Injury  Outcome: Ongoing, Progressing  Intervention: Prevent Ventilator-Associated Pneumonia  Recent Flowsheet Documentation  Taken 4/18/2024 0400 by Amarjit Perez RN  Oral Care: oral rinse provided  Taken 4/17/2024 2339 by Amarjit Perez RN  Head of Bed (HOB) Positioning: HOB at 30-45 degrees  Taken 4/17/2024 1942 by Amarjit Perez RN  Head of Bed ([facility]) Positioning: HOB at 20-30 degrees     Problem: Skin Injury Risk Increased  Goal: Skin Health and Integrity  Outcome: Ongoing, Progressing  Intervention: Optimize Skin Protection  Recent Flowsheet Documentation  Taken 4/17/2024 2339 by Amarjit Perez RN  Head of Bed (HOB) Positioning: HOB at 30-45 degrees  Taken 4/17/2024 1942 by Amarjit Perez RN  Head of Bed (HOB) Positioning: HOB at 20-30 degrees     Problem: Hypertension Comorbidity  Goal: Blood Pressure in Desired Range  Outcome: Ongoing, Progressing  Intervention: Maintain Blood Pressure Management  Recent Flowsheet Documentation  Taken 4/17/2024 1942 by mAarjit Perez RN  Medication Review/Management: medications reviewed     Problem: Fall Injury Risk  Goal: Absence of Fall and Fall-Related Injury  Outcome: Ongoing,  Progressing  Intervention: Identify and Manage Contributors  Recent Flowsheet Documentation  Taken 4/17/2024 1942 by Amarjit Perez RN  Medication Review/Management: medications reviewed  Intervention: Promote Injury-Free Environment  Recent Flowsheet Documentation  Taken 4/18/2024 0544 by Amarjit Perez RN  Safety Promotion/Fall Prevention: safety round/check completed  Taken 4/18/2024 0500 by Amarjit Perez RN  Safety Promotion/Fall Prevention: safety round/check completed  Taken 4/18/2024 0400 by Amarjit Perez RN  Safety Promotion/Fall Prevention: safety round/check completed  Taken 4/18/2024 0300 by Amarjit Perez RN  Safety Promotion/Fall Prevention: safety round/check completed  Taken 4/18/2024 0200 by Amarjit Perez RN  Safety Promotion/Fall Prevention: safety round/check completed  Taken 4/18/2024 0100 by Amarjit Perez RN  Safety Promotion/Fall Prevention: safety round/check completed  Taken 4/18/2024 0000 by Amarjit Perez RN  Safety Promotion/Fall Prevention: safety round/check completed  Taken 4/17/2024 2339 by Amarjit Perez RN  Safety Promotion/Fall Prevention: toileting scheduled  Taken 4/17/2024 2300 by Amarjit Perez RN  Safety Promotion/Fall Prevention: safety round/check completed  Taken 4/17/2024 2200 by Amarjit Perez RN  Safety Promotion/Fall Prevention: safety round/check completed  Taken 4/17/2024 2100 by Amarjit Perez RN  Safety Promotion/Fall Prevention: safety round/check completed  Taken 4/17/2024 1942 by Amarjit Perez RN  Safety Promotion/Fall Prevention:   safety round/check completed   fall prevention program maintained   clutter free environment maintained   assistive device/personal itemswithin reach  Taken 4/17/2024 1900 by Amarjit Perez RN  Safety Promotion/Fall Prevention: safety round/check completed   Goal Outcome Evaluation:   Patient alert and oriented times 4. Tmax 99.7. NSR with PACs 70-90. Borderline hypotensive overnight. 1 liter of albumin given briefly required low dose of  levophed to maintain bp goal. Off at 0515. Extubated at 2325 to 2lnc o2 sat in upper 90's. [place] removed at 0100 CO 7.28 CI 3.45 at that time. Patient expresses adequate pain control prn oxycodone 5 given times 2. UOP approx 40mls/hr. 210 output from MST drains since 1900.

## 2024-04-18 NOTE — PROGRESS NOTES
"Patient name: Nic Castro  Patient : 1975  VISIT # [id number]  MR #[id number]    Procedure:Procedure(s):  AORTIC ROOT REPLACEMENT, ASCENDING AORTIC HEMIARCH REPLACEMENT, AORTIC VALVE REPLACEMENT WITH MECHANICAL VALVE WITH CIRCULATORY ARREST, TRANSESOPHAGEAL ECHOCARDIOGRAM  Procedure Date:2024  POD:1 Day Post-Op    Subjective     Extubated overnight tolerating 2 L nasal cannula.  Weight is up 6 pounds from baseline.  Hemodynamically stable.  Pain is well-controlled.  Due to walk with physical therapy.  On low-dose Levophed    Telemetry: Sinus rhythm 90s  IV drips: IV fluids, Levophed, insulin       Objective     Visit Vitals  /55   Pulse 100   Temp 98.2 °F (36.8 °C) (Oral)   Resp 15   Ht 179 cm (70.47\")   Wt 93 kg (205 lb)   SpO2 91%   BMI 29.02 kg/m²   Baseline weight 199 pounds    Intake/Output Summary (Last 24 hours) at 2024 0822  Last data filed at 2024 0400  Gross per 24 hour   Intake 5990.53 ml   Output 3682 ml   Net 2308.53 ml     MCT: 505 mL in 24 hours, serosanguineous, no airleak    Lab:     CBC:  Results from last 7 days   Lab Units 24  1336   WBC 10*3/mm3 15.20* 11.19* 18.29*   HEMATOCRIT % 29.5* 38.5 35.6*   PLATELETS 10*3/mm3 132* 154 115*          BMP:  Results from last 7 days   Lab Units 24  1349 24  1336   SODIUM mmol/L 138 141  --  143   SODIUM, ARTERIAL mmol/L  --   --  142  --    POTASSIUM mmol/L 4.7 4.4  --  4.0   CHLORIDE mmol/L 105 107  --  111*   CO2 mmol/L 24.0 26.0  --  24.0   GLUCOSE mg/dL 121* 189*  --  160*   BUN mg/dL 15 11  --  11   CREATININE mg/dL 0.74* 0.71*  --  0.65*          COAG:  Results from last 7 days   Lab Units 24  1336 24  1228   INR  1.18*   < > 1.50*   APTT seconds  --   --  42.1*    < > = values in this interval not displayed.       IMAGES:       Imaging Results (Last 24 Hours)       Procedure Component Value Units Date/Time    XR " Chest 1 View [[id number]] Collected: 04/18/24 0701     Updated: 04/18/24 0706    Narrative:      EXAMINATION: XR CHEST 1 VW-  4/18/2024 7:01 AM 1 view     HISTORY: Post-Op Heart Surgery     COMPARISON: 4/17/2024     TECHNIQUE: A single frontal view of the chest was obtained.     FINDINGS:  Postoperative changes of median sternotomy are again seen. Introducer  sheath in the RIGHT IJ is again noted. The [place]-Kelley catheter has been  removed. Mediastinal drains are again seen. No pneumothorax. Some mild  LEFT basilar atelectasis suspected. No large effusion or concerning  consolidation. The osseous structures and surrounding soft tissues  demonstrate no acute abnormality.       Impression:         1.  Expected postprocedural changes but no definite acute  cardiopulmonary process. There has been interval removal of the  endotracheal tube and the G[place]-Kelley catheter.           This report was signed and finalized on 4/18/2024 7:03 AM by Dr. Jered Negrete MD.       XR Chest 1 View [[id number]] Collected: 04/17/24 1428     Updated: 04/17/24 1433    Narrative:      EXAM: XR CHEST 1 VW-      DATE: 4/17/2024 1:26 PM     HISTORY: Post-Op Check Line & Tube Placement; I71.21-Aneurysm of the  ascending aorta, without rupture       COMPARISON: 4/15/2024.     TECHNIQUE:  Frontal view(s) of the chest submitted.     FINDINGS:    Patient is status post median sternotomy. Tracheostomy tube tip is 5 cm  above the katey. Right IJ Gakona-Kelley catheter tip at the pulmonary  outflow tract. Probable chest tube noted to the left of midline. No  effusion or pneumothorax is seen. There is probable pneumomediastinum or  pneumopericardium likely postoperative. Continued attention on follow-up  recommended.          Impression:         1. Postoperative chest with properly positioned support tubes and lines.     This report was signed and finalized on 4/17/2024 2:30 PM by Harsh Larsen.             CXR: Chest tubes in stable position with no  pneumothorax.  Trace bibasilar atelectasis.    Physical Exam:  General: Alert, oriented. No apparent distress.   Cardiovascular: Regular rate and rhythm without murmur, rubs, or gallops.    Pulmonary: Clear to auscultation bilaterally without wheezing, rubs, or rales.  Chest: Sternotomy incision clean, dry, and intact. Sternum stable. No clicks.  Mediastinal chest tubes to 20 cm suction. No air leak. Fluid is serosanguineous.   Abdomen: Soft, nondistended, and nontender.  Extremities: Warm, moves all extremities. No edema.    Neurologic:  Grossly intact with no focal deficits.            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  Daily PT/INR  Discontinue insulin drip  Calcium chloride 1 g IV x 1 dose now  Albumin 250 mL x 1 now  Multimodality pain control regimen  Encourage pulmonary toilet and ambulation  Routine postcardiac surgery care  Keep chest tubes today  Remain in ICU for now  Discussed with patient and nursing      ISABEL Yin  04/18/24  08:22 CDT

## 2024-04-18 NOTE — THERAPY EVALUATION
Patient Name: Nic Castro  : 1975    MRN: [id number]                              Today's Date: 2024       Admit Date: 2024    Visit Dx:     ICD-10-CM ICD-9-CM   1. Impaired mobility [Z74.09]  Z74.09 799.89   2. Aneurysm of the ascending aorta, without rupture  I71.21 441.2     Patient Active Problem List   Diagnosis    Aneurysm of the ascending aorta, without rupture    Chest pain    Dyslipidemia    Tobacco use    Overweight with body mass index (BMI) of 28 to 28.9 in adult    Aortic root aneurysm     Past Medical History:   Diagnosis Date    AAA (abdominal aortic aneurysm)     Anxiety     Arthritis     GERD (gastroesophageal reflux disease)     Hepatitis C     Hyperlipidemia     Hypertension     Pancreatitis     Seizure     from head injury    Substance abuse      Past Surgical History:   Procedure Laterality Date    ANKLE LIGAMENT RECONSTRUCTION Left 2015    ASCENDING ARCH/HEMIARCH REPLACEMENT N/A 2024    Procedure: AORTIC ROOT REPLACEMENT, ASCENDING AORTIC HEMIARCH REPLACEMENT, AORTIC VALVE REPLACEMENT WITH MECHANICAL VALVE WITH CIRCULATORY ARREST, TRANSESOPHAGEAL ECHOCARDIOGRAM;  Surgeon: Sandeep Duran MD;  Location:  [facility] OR;  Service: Cardiothoracic;  Laterality: N/A;    CARDIAC CATHETERIZATION N/A 2023    Procedure: Left Heart Cath;  Surgeon: Nikolas Ramos MD;  Location:  [facility];  Service: Cardiology;  Laterality: N/A;    TOOTH EXTRACTION      Full mouth extraction      General Information       Row Name 24 08          Physical Therapy Time and Intention    Document Type evaluation  Dx; aneurysm of ascending aorta leading to aortic root replacement, aortic valave replacement and ascending aortic hemiarch replacement. H/o L heart cath in 2023.  -MARCELO (florentin) ALOAN (michael) MARCELO (jared)     Mode of Treatment physical therapy  -MARCELO (florentin) ALONA (michael) MARCELO (jared)       Row Name 24 08          General Information    Patient Profile Reviewed yes  -MARCELO wolf) ALONA herron) MARCELO vanegas)      Prior Level of Function independent:;all household mobility;community mobility;gait;transfer;bed mobility;ADL's;home management  -MARCELO (r) AJ (t) MARCELO (c)     Existing Precautions/Restrictions fall;sternal;oxygen therapy device and L/min;other (see comments)   2.5 L/min, 1 chest tube, coon  -MARCELO (r) AJ (t) MARCELO (c)     Barriers to Rehab physical barrier  -MARCELO (r) AJ (t) MARCELO (c)       Row Name 04/18/24 01          Living Environment    People in Home spouse  -MARCELO (r) AJ (t) MARCELO (c)       Row Name 04/18/24 01          Home Main Entrance    Number of Stairs, Main Entrance six  -MARCELO (r) AJ (t) MARCELO (c)     Stair Railings, Main Entrance railing on left side (ascending);railings safe and in good condition  -MARCELO (r) AJ (t) MARCELO (c)       Row Name 04/18/24 01          Stairs Within Home, Primary    Number of Stairs, Within Home, Primary none  -MARCELO (r) AJ (t) MARCELO (c)     Stair Railings, Within Home, Primary none  -MARCELO (r) AJ (t) MARCELO (c)       Row Name 04/18/24 01          Cognition    Orientation Status (Cognition) oriented x 4  -MARCELO (r) AJ (t) MARCELO (c)       Row Name 04/18/24 01          Safety Issues, Functional Mobility    Safety Issues Affecting Function (Mobility) impulsivity;safety precaution awareness;safety precautions follow-through/compliance  -MARCELO (r) AJ (t) MARCELO (c)     Impairments Affecting Function (Mobility) pain;endurance/activity tolerance  -MARCELO (r) AJ (t) MARCELO (c)               User Key  (r) = Recorded By, (t) = Taken By, (c) = Cosigned By      Initials Name Provider Type    Jake Herndon, PT DPT Physical Therapist    Sandeep Valdivia, PT Student PT Student                   Mobility       Row Name 04/18/24 01          Sit-Stand Transfer    Sit-Stand Jones (Transfers) minimum assist (75% patient effort);1 person assist;1 person to manage equipment  -MARCELO (r) AJ (t) MARCELO (c)     Assistive Device (Sit-Stand Transfers) other (see comments)  heart pillow supported under elbow  -MARCELO (r) AJ (t) MARCELO (c)       Row Name  04/18/24 0801          Gait/Stairs (Locomotion)    [place] Level (Gait) minimum assist (75% patient effort);1 person assist;1 person to manage equipment  -MARCELO (r) AJ (t) MARCELO (c)     Distance in Feet (Gait) 200  -MARCELO (r) AJ (t) MARCELO (c)               User Key  (r) = Recorded By, (t) = Taken By, (c) = Cosigned By      Initials Name Provider Type    Jake Herndon, PT DPT Physical Therapist    Sandeep Valdivia, PT Student PT Student                   Obj/Interventions       Row Name 04/18/24 0801          Range of Motion Comprehensive    General Range of Motion no range of motion deficits identified;bilateral lower extremity ROM WFL  -MARCELO (r) AJ (t) MARCELO (c)       Row Name 04/18/24 0801          Strength Comprehensive (MMT)    General Manual Muscle Testing (MMT) Assessment no strength deficits identified  -MARCELO (r) AJ (t) MARCELO (c)     Comment, General Manual Muscle Testing (MMT) Assessment grossly WFL  -MARCELO (r) AJ (t) MARCELO (c)       Row Name 04/18/24 0801          Balance    Balance Assessment sitting static balance;sitting dynamic balance;standing static balance;standing dynamic balance  -MARCELO (r) AJ (t) MARCELO (c)     Static Sitting Balance independent  -MARCELO (r) AJ (t) MARCELO (c)     Dynamic Sitting Balance independent  -MARCELO (r) AJ (t) MARCELO (c)     Position, Sitting Balance supported;unsupported;sitting in chair  -MARCELO (r) AJ (t) MARCELO (c)     Static Standing Balance standby assist  -MARCELO (r) AJ (t) MARCELO (c)     Dynamic Standing Balance standby assist  -MARCELO (r) AJ (t) MARCELO (c)     Position/Device Used, Standing Balance supported  -MARCELO (r) AJ (t) MARCELO (c)     Balance Interventions sitting;standing;sit to stand;supported;dynamic;static  -MARCELO (r) AJ (t) MARCELO (c)       Row Name 04/18/24 0801          Sensory Assessment (Somatosensory)    Sensory Assessment (Somatosensory) sensation intact  -MARCELO (r) AJ (t) MARCELO (c)               User Key  (r) = Recorded By, (t) = Taken By, (c) = Cosigned By      Initials Name Provider Type    Jake Herndon PT DPT  Physical Therapist    Sandeep Valdivia, PT Student PT Student                   Goals/Plan       Row Name 04/18/24 0801          Bed Mobility Goal 1 (PT)    Activity/Assistive Device (Bed Mobility Goal 1, PT) rolling to left;rolling to right;sit to supine;supine to sit  -MARCELO (r) AJ (t) MARCELO (c)     Towner Level/Cues Needed (Bed Mobility Goal 1, PT) independent  -MARCELO (r) AJ (t) MARCELO (c)     Time Frame (Bed Mobility Goal 1, PT) long term goal (LTG);10 days  -MARCELO (r) AJ (t) MARCELO (c)     Progress/Outcomes (Bed Mobility Goal 1, PT) new goal  -MARCELO (r) AJ (t) MARCELO (c)       Row Name 04/18/24 0801          Transfer Goal 1 (PT)    Activity/Assistive Device (Transfer Goal 1, PT) sit-to-stand/stand-to-sit;bed-to-chair/chair-to-bed;toilet;tub  -MARCELO (r) AJ (t) MARCELO (c)     Towner Level/Cues Needed (Transfer Goal 1, PT) independent  -MARCELO (r) AJ (t) MARCELO (c)     Time Frame (Transfer Goal 1, PT) long term goal (LTG);10 days  -MARCELO (r) AJ (t) MARCELO (c)     Progress/Outcome (Transfer Goal 1, PT) new goal  -MARCELO (r) AJ (t) MARCELO (c)       Row Name 04/18/24 0801          Gait Training Goal 1 (PT)    Activity/Assistive Device (Gait Training Goal 1, PT) gait (walking locomotion);decrease fall risk;forward stepping;improve balance and speed;increase endurance/gait distance  -MARCELO (r) AJ (t) MARCELO (c)     Towner Level (Gait Training Goal 1, PT) supervision required  -MARCELO     Distance (Gait Training Goal 1, PT) 500'  -MARCELO (r) AJ (t) MARCELO (c)     Time Frame (Gait Training Goal 1, PT) long term goal (LTG);10 days  -MARCELO (r) AJ (t) MARCELO (c)     Progress/Outcome (Gait Training Goal 1, PT) new goal  -MARCELO (r) AJ (t) MARCELO (c)       Row Name 04/18/24 0801          Stairs Goal 1 (PT)    Activity/Assistive Device (Stairs Goal 1, PT) ascending stairs;descending stairs;step-to-step;using handrail, left;decrease fall risk;improve balance and speed  -MARCELO (r) ALONA (t) MARCELO (c)     Towner Level/Cues Needed (Stairs Goal 1, PT) standby assist  -MARCELO (r) ALONA (t) MARCELO (c)     Number of  Stairs (Stairs Goal 1, PT) 6  -MARCELO (r) AJ (t) MARCELO (c)     Time Frame (Stairs Goal 1, PT) long term goal (LTG);10 days  -MARCELO (r) AJ (t) MARCELO (c)     Progress/Outcome (Stairs Goal 1, PT) new goal  -MARCELO (r) AJ (t) MARCELO (c)       Row Name 04/18/24 0801          Therapy Assessment/Plan (PT)    Planned Therapy Interventions (PT) balance training;bed mobility training;gait training;home exercise program;patient/family education;transfer training;ROM (range of motion);stair training;strengthening  -MARCELO (r) AJ (t) MARCELO (c)               User Key  (r) = Recorded By, (t) = Taken By, (c) = Cosigned By      Initials Name Provider Type    Jake Herndon, PT DPT Physical Therapist    Sandeep Valdivia, PT Student PT Student                   Clinical Impression       Row Name 04/18/24 0801          Pain    Pretreatment Pain Rating 7/10  -MARCELO (r) AJ (t) MARCELO (c)     Posttreatment Pain Rating 7/10  -MARCELO (r) AJ (t) MARCELO (c)     Pain Location generalized  -MARCELO (r) AJ (t) MARCELO (c)     Pain Location - chest  -MARCELO (r) AJ (t) MARCELO (c)     Pain Intervention(s) Medication (See MAR);Repositioned;Ambulation/increased activity  -MARCELO       Row Name 04/18/24 0801          Plan of Care Review    Plan of Care Reviewed With patient  -MARCELO (r) AJ (t) MARCELO (c)     Outcome Evaluation PT eval completed. Pt resting in bedside chair, AOx4, requiring 2.5 L/min, with pain reported over chest. Pt reports independence at his baseline and still works but knows it will take some time. Pt pre vitals , SpO2 95% on 2.5 L/min, /66, RR 15. Pt demo functional AROM and functional B LE strength. Pt demo sit to stand with Min Ax1 and 1 person for equipment, pt demo posterior lean once initially standing but corrected independently. Pt tolerated 200' walk with no complaints and stated the IV pole was slowing him down most. Pt return to bed and educated on continuing OOB activity with PT services and maintaining sternal precautions. Pt will benefit from skilled pt services to  regain safe stair negotiation, activity tolerance, return to PLOF. Pt post vitals , SpO2 94, RR 24, /62. Anticipate d.c home with assist from spouse when medically stable  -MARCELO (r) AJ (t) MARCELO (c)       Row Name 04/18/24 0801          Therapy Assessment/Plan (PT)    Patient/Family Therapy Goals Statement (PT) get well  -MARCELO (r) AJ (t) MARCELO (c)     Rehab Potential (PT) good, to achieve stated therapy goals  -MARCELO (r) AJ (t) MARCELO (c)     Criteria for Skilled Interventions Met (PT) yes;meets criteria;skilled treatment is necessary  -MARCELO (r) AJ (t) MARCELO (c)     Therapy Frequency (PT) 2 times/day  -MARCELO (r) AJ (t) MARCELO (c)     Predicted Duration of Therapy Intervention (PT) until d/c  -MARCELO (r) AJ (t) MARCELO (c)       Row Name 04/18/24 0801          Vital Signs    Pre Systolic BP Rehab 114  -MARCELO (r) AJ (t) MARCELO (c)     Pre Treatment Diastolic BP 66  -MARCELO (r) AJ (t) MARCELO (c)     Post Systolic BP Rehab 109  -MARCELO (r) AJ (t) MARCELO (c)     Post Treatment Diastolic BP 62  -MARCELO (r) AJ (t) MARCELO (c)     Pretreatment Heart Rate (beats/min) 101  -MARCELO (r) AJ (t) MARCELO (c)     Posttreatment Heart Rate (beats/min) 103  -MARCELO (r) AJ (t) MARCELO (c)     Pretreatment Resp Rate (breaths/min) 15  -MARCELO (r) AJ (t) MARCELO (c)     Posttreatment Resp Rate (breaths/min) 24  -MARCELO (r) AJ (t) MARCELO (c)     Pre SpO2 (%) 95  -MARCELO (r) AJ (t) MARCELO (c)     O2 Delivery Pre Treatment nasal cannula  2.5 L  -MARCELO (r) AJ (t) MARCELO (c)     Post SpO2 (%) 94  -MARCELO (r) AJ (t) MARCELO (c)     O2 Delivery Post Treatment nasal cannula  2.5 L  -MARCELO (r) AJ (t) MARCELO (c)     Pre Patient Position Sitting  -MARCELO (r) AJ (t) MARCELO (c)     Intra Patient Position Standing  -MARCELO (r) AJ (t) MARCELO (c)     Post Patient Position Sitting  -MARCELO (r) AJ (t) MARCELO (jared)       Row Name 04/18/24 0801          Positioning and Restraints    Pre-Treatment Position sitting in chair/recliner  -MARCELO wolf) ALONA (michael) MARCELO (jared)     Post Treatment Position chair  -MARCELO wolf) ALONA (michael) MARCELO (jared)     In Chair reclined;call light within reach;encouraged to call for assist;patient within staff  view;legs elevated;notified nsg  -MARCELO (r) AJ (t) MARCELO (c)               User Key  (r) = Recorded By, (t) = Taken By, (c) = Cosigned By      Initials Name Provider Type    Jake Herndon, PT DPT Physical Therapist    Sandeep Valdivia, PT Student PT Student                   Outcome Measures       Row Name 04/18/24 0821 04/18/24 0801       How much help from another person do you currently need...    Turning from your back to your side while in flat bed without using bedrails? 3  -HL (r) KJ (t) HL (c) 3  -MARCELO (r) AJ (t) MARCELO (c)    Moving from lying on back to sitting on the side of a flat bed without bedrails? 3  -HL (r) KJ (t) HL (c) 3  -MARCELO (r) AJ (t) MARCELO (c)    Moving to and from a bed to a chair (including a wheelchair)? 3  -HL (r) KJ (t) HL (c) 3  -MARCELO (r) AJ (t) MARCELO (c)    Standing up from a chair using your arms (e.g., wheelchair, bedside chair)? 3  -HL (r) KJ (t) HL (c) 3  -MARCELO    Climbing 3-5 steps with a railing? 2  -HL (r) KJ (t) HL (c) 3  -MARCELO (r) AJ (t) MARCELO (c)    To walk in hospital room? 3  -HL (r) KJ (t) HL (c) 3  -MARCELO (r) AJ (t) MARCELO (c)    AM-PAC 6 Clicks Score (PT) 17  -HL (r) KJ (t) 18  -MARCELO    Highest Level of Mobility Goal 5 --> Static standing  -HL (r) KJ (t) 6 --> Walk 10 steps or more  -MARCELO (r) AJ (t)      Row Name 04/18/24 0400          How much help from another person do you currently need...    Turning from your back to your side while in flat bed without using bedrails? 3  -SY     Moving from lying on back to sitting on the side of a flat bed without bedrails? 3  -SY     Moving to and from a bed to a chair (including a wheelchair)? 2  -SY     Standing up from a chair using your arms (e.g., wheelchair, bedside chair)? 2  -SY     Climbing 3-5 steps with a railing? 2  -SY     To walk in hospital room? 2  -SY     AM-PAC 6 Clicks Score (PT) 14  -SY     Highest Level of Mobility Goal 4 --> Transfer to chair/commode  -SY       Row Name 04/18/24 0833          Functional Assessment    Outcome Measure Options  AM-PAC 6 Clicks Basic Mobility (PT)  -MARCELO (r) ALONA (t) MARCELO (c)               User Key  (r) = Recorded By, (t) = Taken By, (c) = Cosigned By      Initials Name Provider Type    Jake Herndon, PT DPT Physical Therapist    Amarjit Jackman, RN Registered Nurse    Manisha Thacker, RN, BSN Registered Nurse    Uyen Whyte, Nursing Student Nursing Student    Sandeep Valdivia, PT Student PT Student                                 Physical Therapy Education       Title: PT OT SLP Therapies (Done)       Topic: Physical Therapy (Done)       Point: Mobility training (Done)       Learning Progress Summary             Patient Acceptance, E, VU by ALONA at 4/18/2024 0853    Comment: safe mobility, fall risk, sternal precautions                         Point: Home exercise program (Done)       Learning Progress Summary             Patient Acceptance, E, VU by ALONA at 4/18/2024 0853    Comment: safe mobility, fall risk, sternal precautions                         Point: Body mechanics (Done)       Learning Progress Summary             Patient Acceptance, E, VU by ALONA at 4/18/2024 0853    Comment: safe mobility, fall risk, sternal precautions                         Point: Precautions (Done)       Learning Progress Summary             Patient Acceptance, E, VU by ALONA at 4/18/2024 0853    Comment: safe mobility, fall risk, sternal precautions                                         User Key       Initials Effective Dates Name Provider Type Discipline     02/22/24 -  Sandeep Person, PT Student PT Student PT                  PT Recommendation and Plan  Planned Therapy Interventions (PT): balance training, bed mobility training, gait training, home exercise program, patient/family education, transfer training, ROM (range of motion), stair training, strengthening  Plan of Care Reviewed With: patient  Outcome Evaluation: PT eval completed. Pt resting in bedside chair, AOx4, requiring 2.5 L/min, with pain reported over chest. Pt  reports independence at his baseline and still works but knows it will take some time. Pt pre vitals , SpO2 95% on 2.5 L/min, /66, RR 15. Pt demo functional AROM and functional B LE strength. Pt demo sit to stand with Min Ax1 and 1 person for equipment, pt demo posterior lean once initially standing but corrected independently. Pt tolerated 200' walk with no complaints and stated the IV pole was slowing him down most. Pt return to bed and educated on continuing OOB activity with PT services and maintaining sternal precautions. Pt will benefit from skilled pt services to regain safe stair negotiation, activity tolerance, return to PLOF. Pt post vitals , SpO2 94, RR 24, /62. Anticipate d.c home with assist from spouse when medically stable     Time Calculation:         PT Charges       Row Name 04/18/24 0801             Time Calculation    Start Time 0801  -MARCELO (r) AJ (t) MARCELO (c)      Stop Time 0841  -MARCELO (r) AJ (t) MARCELO (c)      Time Calculation (min) 40 min  -MARCELO (r) AJ (t)      PT Received On 04/18/24  -MARCELO (r) AJ (t) MARCELO (c)      PT Goal Re-Cert Due Date 04/28/24  -MARCELO (r) AJ (t) MARCELO (c)         Untimed Charges    PT Eval/Re-eval Minutes 40  -MARCELO (r) AJ (t) MARCELO (c)         Total Minutes    Untimed Charges Total Minutes 40  -MARCELO (r) AJ (t)       Total Minutes 40  -MARCELO (r) AJ (t)                User Key  (r) = Recorded By, (t) = Taken By, (c) = Cosigned By      Initials Name Provider Type    Jake Herndon, PT DPT Physical Therapist    Sandeep Valdivia, PT Student PT Student                      PT G-Codes  Outcome Measure Options: AM-PAC 6 Clicks Basic Mobility (PT)  AM-PAC 6 Clicks Score (PT): 17  PT Discharge Summary  Anticipated Discharge Disposition (PT): home with assist    Sandeep Person PT Student  4/18/2024

## 2024-04-18 NOTE — PAYOR COMM NOTE
"ADMIT INPT 4-17-24  [id number]    [phone]    Raj Castro (48 y.o. Male)       Date of Birth   1975    Social Security Number       Address   [address] [facility] [address]    Home Phone   [phone]    MRN   [id number]       Holiness   None    Marital Status                               Admission Date   4/17/24    Admission Type   Elective    Admitting Provider   Sandeep Duran MD    Attending Provider   Sandeep Duran MD    Department, Room/Bed   [facility] INTENSIVE CARE, I006/1       Discharge Date       Discharge Disposition       Discharge Destination                                 Attending Provider: Sandeep Duran MD    Allergies: No Known Allergies    Isolation: None   Infection: None   Code Status: CPR    Ht: 179 cm (70.47\")   Wt: 93 kg (205 lb)    Admission Cmt: None   Principal Problem: Aneurysm of the ascending aorta, without rupture [I71.21]                   Active Insurance as of 4/17/2024       Primary Coverage       Payor Plan Insurance Group Employer/Plan Group    [place] [id number]       Payor Plan Address Payor Plan Phone Number Payor Plan Fax Number Effective Dates    [employer] [address] [address] [phone]  3/1/2024 - None Entered    [facility] [id number]         Subscriber Name Subscriber Birth Date Member ID       RAJ CASTRO 1975 [id number]                     Emergency Contacts        (Rel.) Home Phone Work Phone Mobile Phone    Lubna Castro (Spouse) [phone] -- [phone]                 History & Physical        Duran, Sandeep HECTOR MD at 04/17/24 0628          Since H&P in clinic has undergone full mouth extraction.  Coronary angiography without significant obstructive disease.  Long discussions with patient regarding valve type, he wants to proceed with mechanical valve replacement as part of the aortic root replacement.  He understands the need for life long anticoagulation " with coumadin.  Discussed overall risks and benefits of surgery again with him, he understands and agrees to proceed.    Sandeep Duran M.D.  Cardiothoracic Surgeon    Cardiothoracic Surgery Consultation     Referring Physician: Dr. Kyle Villagran     Primary Care Physician: Dr. Jhon Pittman          Chief Complaint   Patient presents with    Aortic Aneurysm       New patient from Dr Villagran               Subjective  History of Present Illness     Nic Castro is a 48-year-old male who presents for evaluation of an aortic aneurysm. He is accompanied by his wife.     The patient reports that about 4 years ago, he was having some dizziness and had a strange sharp pain in his stomach. The abdominal pain did not last very long, but he still felt dizzy, so he went to the hospital. He was discovered to have an aortic aneurysm that was 3.5 cm, however they would not operate on them until they were 5.5 cm. He had another CT scan between then and the one he just had, and it showed the aortic aneurysm in his chest was changing.      He has received conflicting information about the size of his aortic aneurysm.      He denies chest pain but does sometimes feels tight in his chest.     He denies any heart problems before. He denies any stroke or mini stroke that he is aware of. He is otherwise healthy.     He denies any surgery on his heart, lungs, or chest.      He does not go to the dentist regularly. He has 6 teeth that could be of concern. He does not have dental insurance.     He has no known family history of aortic aneurysms.         He smokes about half a pack of cigarettes a day. He has been trying to quit lately. He denies any illegal drug use.     He denies any family history of aortic aneurysm.        Review of Systems      A complete review of systems was performed, is negative except stated above.     Medical History        Past Medical History:   Diagnosis Date    Anxiety      Pancreatitis      Substance abuse            Surgical History         Past Surgical History:   Procedure Laterality Date    ANKLE LIGAMENT RECONSTRUCTION Left 2015         History reviewed. No pertinent family history.  Social History   Social History            Tobacco Use    Smoking status: Every Day       Packs/day: .5       Types: Cigarettes       Start date: 2003    Smokeless tobacco: Never   Substance Use Topics    Alcohol use: Not Currently    Drug use: Never         Current Medications          Current Outpatient Medications   Medication Sig Dispense Refill    ARIPiprazole (ABILIFY) 5 MG tablet Take 1 tablet by mouth Daily.        aspirin 81 MG EC tablet Take 1 tablet by mouth Daily.        atorvastatin (Lipitor) 10 MG tablet Take 1 tablet by mouth Daily. 90 tablet 0    busPIRone (BUSPAR) 15 MG tablet Take 1 tablet by mouth 3 (Three) Times a Day.        clonazePAM (KlonoPIN) 0.5 MG tablet Take 1 tablet by mouth Every 12 (Twelve) Hours.        diazePAM (VALIUM) 5 MG tablet Take 1 tablet by mouth Every 8 (Eight) Hours As Needed. for anxiety        folic acid (FOLVITE) 1 MG tablet          hydrOXYzine (ATARAX) 25 MG tablet Take 1 tablet by mouth 3 (Three) Times a Day As Needed.        naltrexone (DEPADE) 50 MG tablet Take 1 tablet by mouth Daily.        pantoprazole (PROTONIX) 40 MG EC tablet Take 1 tablet by mouth Daily.        PARoxetine (PAXIL) 40 MG tablet Take 1 tablet by mouth Daily.        prazosin (MINIPRESS) 2 MG capsule Take 1 capsule by mouth Every Night.        QUEtiapine (SEROquel) 100 MG tablet Take 1 tablet by mouth every night at bedtime.        sertraline (ZOLOFT) 100 MG tablet Take 2 tablets by mouth Daily.        SM Nicotine 14 MG/24HR patch Place 1 patch on the skin as directed by provider Daily.        traZODone (DESYREL) 100 MG tablet Take 1 tablet by mouth Daily.          No current facility-administered medications for this visit.         Allergies:  Patient has no known allergies.           Objective  Vital Signs  Visit  "Vitals  /82 (BP Location: Right arm, Patient Position: Sitting, Cuff Size: Adult)   Pulse 102   Ht 180.3 cm (71\")   Wt 86.6 kg (191 lb)   SpO2 98%   BMI 26.64 kg/m²            Physical Exam  Vitals and nursing note reviewed.   Constitutional:       Appearance: He is well-developed.   HENT:      Head: Normocephalic.   Neck:      Thyroid: No thyroid mass.      Vascular: No carotid bruit or JVD.      Trachea: Trachea and phonation normal.   Cardiovascular:      Rate and Rhythm: Normal rate and regular rhythm.      Pulses:           Radial pulses are 2+ on the right side and 2+ on the left side.        Posterior tibial pulses are 2+ on the right side and 2+ on the left side.      Heart sounds: Normal heart sounds. No murmur heard.     No friction rub. No gallop.   Pulmonary:      Effort: Pulmonary effort is normal. No respiratory distress.      Breath sounds: Normal breath sounds. No wheezing or rales.   Musculoskeletal:         General: No swelling. Normal range of motion.      Cervical back: Neck supple.   Skin:     General: Skin is warm and dry.      Capillary Refill: Capillary refill takes less than 2 seconds.      Findings: No rash.   Neurological:      Mental Status: He is alert and oriented to person, place, and time.   Psychiatric:         Speech: Speech normal.         Behavior: Behavior normal.         Thought Content: Thought content normal.         Judgment: Judgment normal.            Results Review:         WBC   Date Value Ref Range Status   12/21/2023 8.92 3.40 - 10.80 10*3/mm3 Final            RBC   Date Value Ref Range Status   12/21/2023 5.35 4.14 - 5.80 10*6/mm3 Final            Hemoglobin   Date Value Ref Range Status   12/21/2023 14.6 13.0 - 17.7 g/dL Final            Hematocrit   Date Value Ref Range Status   12/21/2023 45.5 37.5 - 51.0 % Final            MCV   Date Value Ref Range Status   12/21/2023 85.0 79.0 - 97.0 fL Final            MCH   Date Value Ref Range Status   12/21/2023 27.3 " 26.6 - 33.0 pg Final            MCHC   Date Value Ref Range Status   12/21/2023 32.1 31.5 - 35.7 g/dL Final            RDW   Date Value Ref Range Status   12/21/2023 13.2 12.3 - 15.4 % Final            RDW-SD   Date Value Ref Range Status   12/21/2023 40.3 37.0 - 54.0 fl Final            MPV   Date Value Ref Range Status   12/21/2023 9.5 6.0 - 12.0 fL Final            Platelets   Date Value Ref Range Status   12/21/2023 211 140 - 450 10*3/mm3 Final            Neutrophil %   Date Value Ref Range Status   12/21/2023 58.4 42.7 - 76.0 % Final            Lymphocyte %   Date Value Ref Range Status   12/21/2023 26.3 19.6 - 45.3 % Final            Monocyte %   Date Value Ref Range Status   12/21/2023 10.2 5.0 - 12.0 % Final            Eosinophil %   Date Value Ref Range Status   12/21/2023 4.0 0.3 - 6.2 % Final            Basophil %   Date Value Ref Range Status   12/21/2023 0.8 0.0 - 1.5 % Final            Immature Grans %   Date Value Ref Range Status   12/21/2023 0.3 0.0 - 0.5 % Final            Neutrophils, Absolute   Date Value Ref Range Status   12/21/2023 5.20 1.70 - 7.00 10*3/mm3 Final            Lymphocytes, Absolute   Date Value Ref Range Status   12/21/2023 2.35 0.70 - 3.10 10*3/mm3 Final            Monocytes, Absolute   Date Value Ref Range Status   12/21/2023 0.91 (H) 0.10 - 0.90 10*3/mm3 Final            Eosinophils, Absolute   Date Value Ref Range Status   12/21/2023 0.36 0.00 - 0.40 10*3/mm3 Final            Basophils, Absolute   Date Value Ref Range Status   12/21/2023 0.07 0.00 - 0.20 10*3/mm3 Final            Immature Grans, Absolute   Date Value Ref Range Status   12/21/2023 0.03 0.00 - 0.05 10*3/mm3 Final            nRBC   Date Value Ref Range Status   12/21/2023 0.0 0.0 - 0.2 /100 WBC Final            Glucose   Date Value Ref Range Status   12/21/2023 92 65 - 99 mg/dL Final            Sodium   Date Value Ref Range Status   12/21/2023 138 136 - 145 mmol/L Final   11/06/2023 139 135 - 145 mmol/L Final               Potassium   Date Value Ref Range Status   12/21/2023 4.3 3.5 - 5.2 mmol/L Final       Comment:       Slight hemolysis detected by analyzer. Result may be falsely elevated.   11/06/2023 3.7 3.5 - 5.0 mmol/L Final            CO2   Date Value Ref Range Status   12/21/2023 26.0 22.0 - 29.0 mmol/L Final            Total CO2   Date Value Ref Range Status   11/06/2023 29 21 - 32 mmol/L Final            Chloride   Date Value Ref Range Status   12/21/2023 102 98 - 107 mmol/L Final   11/06/2023 102 98 - 107 mmol/L Final            Anion Gap   Date Value Ref Range Status   12/21/2023 10.0 5.0 - 15.0 mmol/L Final   11/06/2023 8 6 - 16 mmol/L Final            Creatinine   Date Value Ref Range Status   12/21/2023 0.81 0.76 - 1.27 mg/dL Final   11/06/2023 0.92 0.60 - 1.30 mg/dL Final            BUN   Date Value Ref Range Status   12/21/2023 9 6 - 20 mg/dL Final   11/06/2023 10 7 - 18 mg/dL Final            BUN/Creatinine Ratio   Date Value Ref Range Status   12/21/2023 11.1 7.0 - 25.0 Final   11/06/2023 10.9 11.7 - 13.9 Final              Calcium   Date Value Ref Range Status   12/21/2023 9.4 8.6 - 10.5 mg/dL Final   11/06/2023 9.0 8.5 - 10.1 mg/dL Final       Comment:       Calcium measurements are adversely affected by the use of Omniscan during MRI. Analysis of calcium is not recommended for 12 to 24 hours after the use of the contrast agent.             eGFR Non  Am   Date Value Ref Range Status   11/06/2023 >60.0 >=60.0 mL/min/1.73m2 Final            Alkaline Phosphatase   Date Value Ref Range Status   12/21/2023 83 39 - 117 U/L Final   11/06/2023 62 50 - 136 U/L Final            Total Protein   Date Value Ref Range Status   12/21/2023 7.3 6.0 - 8.5 g/dL Final   11/06/2023 7.2 6.4 - 8.2 g/dL Final            ALT (SGPT)   Date Value Ref Range Status   12/21/2023 61 (H) 1 - 41 U/L Final   11/06/2023 62 16 - 62 U/L Final            AST (SGOT)   Date Value Ref Range Status   12/21/2023 47 (H) 1 - 40 U/L Final    11/06/2023 37 (H) 7 - 34 U/L Final              Total Bilirubin   Date Value Ref Range Status   12/21/2023 0.5 0.0 - 1.2 mg/dL Final   11/06/2023 0.3 0.0 - 1.0 mg/dL Final       Comment:       Use of this assay is not recommended for patients undergoing treatment with eltrombopag due to the potential for falsely elevated results.            Albumin   Date Value Ref Range Status   12/21/2023 4.2 3.5 - 5.2 g/dL Final   11/06/2023 3.6 3.4 - 5.0 g/dL Final            Globulin   Date Value Ref Range Status   12/21/2023 3.1 gm/dL Final                     I reviewed the patient's clinical results and discussed with patient.     CT Chest:: CT chest with contrast performed on 11/06/2023.  FINDINGS:     Lung parenchyma and central airways: Normal.   Pleura: Normal.   Heart and great vessels: Heart size is normal. Some coronary artery   calcification is noted. There is also some scattered ossification of   the pericardium. There is significant enlargement of the ascending   thoracic aorta. The diameter of the thoracic aorta at the level of the   sinuses of Valsalva and sinotubular junction is about 5.4 cm. No   evidence of dissection. There are no prior exam for comparison. The   aorta tapers up to the aortic arch where it has a more normal caliber.   Caliber is also normal in descending thoracic aorta. Small amount of   calcification in some of the great vessels and coronary arteries.   Mediastinum and dana: Small amount of calcification in the mediastinum   consistent was some old granulomatous infection.   Chest wall: Normal.   Spine and other bones: Mild degenerative thoracic spondylosis.   Lower neck: Normal.   Upper abdomen: Normal.      IMPRESSION:     1. Prominent aneurysmal enlargement of the ascending thoracic aorta.   No evidence of dissection or other acute change.   2. Atherosclerotic calcification and coronary arteries consistent with   coronary artery disease.   3. Some pericardial calcification is noted.  This may result in   significant restricted diastolic dysfunction.      ECHO:     I personally reviewed images of following exams, the following is my interpretation:     CT Chest: CT chest shows severely dilated aneurysmal aortic root. I measured it to be 6.1 cm in maximum dimension extends to the level of the root. Aortic arch has a normal branching pattern and the aorta tapers to a 3.9 cm distal ascending/proximal arch. No dissection, IMH, or VANNA. Lungs appear normal.                 Assessment & Plan  Mr. Castro is a 48-year-old male who presents to me with aortic root and ascending aortic aneurysm. This measures 6.1 cm in maximum dimension. He has known about this for many years and per report back in 2019, it measured 5.6 cm. He was referred to Dr. Villagran and subsequently to me. He does not have any symptoms from this. He is a smoker. He has a history of pancreatitis, substance abuse, and anxiety, but he is sober and clean now. He has never had surgery on his heart, lungs, or chest.     I discussed with him and his wife today the natural history of aortic root aneurysms. He has not had an echocardiogram yet. I will obtain one to assess his valve anatomy and valve dysfunction, but I suspect bicuspid valve given the appearance of his aneurysm. We discussed treatment options, and I would recommend surgical replacement of his aorta given its large size at 6.1 cm. The risk of dissection is much higher than the risk of surgery at this point. We discussed preoperative, operative, postoperative expectations at length as well as the risk and benefits of surgery.      We discussed the risk of surgery including but not limited to bleeding, infection, injury to major organs or vessels, chronic heart failure, arrhythmias, need for pacemaker, prolonged ventilation, renal failure, stroke, risk of anesthesia, risk of sternal wound or bone healing problems, reoperation, and/or death. He is going to try to get a dental  appointment as soon as possible and if he is unable by next week, he will call and discuss this with us further. I will refer him to Dr. Nikolas Ramos for a cardiac catheterization given his smoking history and age. I discussed with him valve options of biologic versus mechanical and he wants to think about it. I will discuss this with him further prior to surgery.     I will complete his work-up and plan for surgery soon. Thank you for trusting me with the care of Mr. Castro. Please do not hesitate to call with questions or concerns.           Sandeep Duran MD  Cardiothoracic Surgeon    Electronically signed by Sandeep Duran MD at 04/17/24 0630       Facility-Administered Medications as of 4/18/2024   Medication Dose Route Frequency Provider Last Rate Last Admin    [COMPLETED] acetaminophen (OFIRMEV) injection 1,000 mg  1,000 mg Intravenous Q8H Sandeep Duran MD   1,000 mg at 04/17/24 2205    [COMPLETED] acetaminophen (TYLENOL) tablet 1,000 mg  1,000 mg Oral Once Mehnaz Pizano APRN   1,000 mg at 04/17/24 0602    acetaminophen (TYLENOL) tablet 650 mg  650 mg Oral Q6H Sandeep Duran MD   650 mg at 04/18/24 0511    albumin human 5 % solution 500 mL  500 mL Intravenous PRN Sandeep Duran MD   500 mL at 04/17/24 2239    albuterol (PROVENTIL) nebulizer solution 0.083% 2.5 mg/3mL  2.5 mg Nebulization Q4H PRN Sandeep Duran MD        ARIPiprazole (ABILIFY) tablet 5 mg  5 mg Oral Daily Sandeep Duran MD        aspirin chewable tablet 162 mg  162 mg Oral Once Sandeep uDran MD        [START ON 4/19/2024] aspirin EC tablet 81 mg  81 mg Oral Daily Sandeep Duran MD        atorvastatin (LIPITOR) tablet 20 mg  20 mg Oral Nightly Sandeep Duran MD        bisacodyl (DULCOLAX) EC tablet 10 mg  10 mg Oral BID Sandeep Duran MD        Calcium Replacement - Follow Nurse / BPA Driven Protocol   Does not apply PRN Sandeep Duran MD        chlorhexidine (PERIDEX) 0.12 % solution 15 mL  15 mL Mouth/Throat Q12H Sandeep Duran  MD TAWNY   15 mL at 24 0511    Chlorhexidine Gluconate Cloth 2 % pads 1 Application  1 Application Topical Q24H Duran, Sandeep HECTOR MD   1 Application at 24 0418    clevidipine (CLEVIPREX) infusion 0.5 mg/mL  2-32 mg/hr Intravenous Continuous PRN Duran, Sandeep HECTOR MD        clonazePAM (KlonoPIN) tablet 0.5 mg  0.5 mg Oral Q12H Duran, Sandeep HECTOR MD        dexmedetomidine (PRECEDEX) 400 mcg in 100 mL NS infusion  0.2-1.5 mcg/kg/hr Intravenous Titrated Duran, Sandeep HECTOR MD   Stopped at 24 1735    dextrose (D50W) (25 g/50 mL) IV injection 10-50 mL  10-50 mL Intravenous Q15 Min PRN Roger, Sandeep HECTOR MD        dextrose (GLUTOSE) oral gel 15 g  15 g Oral Q15 Min PRN Duran, Sandeep HECTOR MD        dextrose 5 % with KCl 20 mEq/L infusion  30 mL/hr Intravenous Continuous Duran, Sandeep HECTOR MD 30 mL/hr at 24 1423 30 mL/hr at 24 1423    And    dextrose 5 % with KCl 20 mEq/L infusion  30 mL/hr Intravenous Continuous Duran, Sandeep HECTOR MD 30 mL/hr at 24 1354 30 mL/hr at 24 1354    Enoxaparin Sodium (LOVENOX) syringe 40 mg  40 mg Subcutaneous Daily Duran, Sandeep HECTOR MD        glucagon (GLUCAGEN) injection 1 mg  1 mg Intramuscular Q15 Min PRN Duran, Sandeep HECTOR MD        insulin regular (HumuLIN R,NovoLIN R) 100 Units in sodium chloride 0.9 % 100 mL (1 Units/mL) infusion  0-100 Units/hr Intravenous Titrated Duran, Sandeep HECTOR MD 1.6 mL/hr at 24 0254 1.6 Units/hr at 24 0254    ipratropium-albuterol (DUO-NEB) nebulizer solution 3 mL  3 mL Nebulization 4x Daily - RT Duran, Sandeep HECTOR MD   3 mL at 24 1830    Magnesium Cardiology Dose Replacement - Follow Nurse / BPA Driven Protocol   Does not apply PRN Roger, Sandeep HECTOR MD        magnesium sulfate 4g/100mL (PREMIX) infusion  4 g Intravenous Once Duran, Sandeep HECTOR MD   4 g at 24 0352    [] meperidine (DEMEROL) injection 25 mg  25 mg Intravenous Q1H PRN Duran, Sandeep HECTOR MD        [COMPLETED] methylPREDNISolone sodium succinate (SOLU-Medrol) 500 mg in  sodium chloride 0.9 % 100 mL IVPB  500 mg Intravenous Once Danny Izaguirre, CRNA   500 mg at 04/17/24 0857    metoprolol tartrate (LOPRESSOR) tablet 12.5 mg  12.5 mg Oral Q12H Duran, Sandeep HECTOR MD        Morphine sulfate (PF) injection 2 mg  2 mg Intravenous Q30 Min PRN Duran, Sandeep HECTOR MD        [COMPLETED] mupirocin (BACTROBAN) 2 % nasal ointment   Each Nare Once Mehnaz Pizano APRN   1 Application at 04/17/24 0602    mupirocin (BACTROBAN) 2 % nasal ointment   Each Nare Q12H Duran, Sandeep HECTOR MD   1 Application at 04/18/24 0511    niCARdipine (CARDENE) 25 mg in 250 mL NS infusion kit  5-15 mg/hr Intravenous Continuous PRN Duran, Sandeep HECTOR MD   Held at 04/17/24 1600    nitroglycerin (NITROSTAT) SL tablet 0.4 mg  0.4 mg Sublingual Q5 Min PRN Duran, Sandeep HECTOR MD        norepinephrine (LEVOPHED) 8 mg in 250 mL NS infusion (premix)  0.02-0.3 mcg/kg/min Intravenous Continuous PRN Duran, Sandeep HECTOR MD   Stopped at 04/18/24 0515    ondansetron (ZOFRAN) injection 4 mg  4 mg Intravenous Q6H PRN Duran, Sandeep HECTOR MD        oxyCODONE (ROXICODONE) immediate release tablet 10 mg  10 mg Oral Q4H PRN Duran, Sandeep HECTOR MD        oxyCODONE (ROXICODONE) immediate release tablet 5 mg  5 mg Oral Q4H PRN Duran, Sandeep HECTOR MD   5 mg at 04/18/24 0431    pantoprazole (PROTONIX) EC tablet 40 mg  40 mg Oral Q AM Mehnaz Pizano APRN   40 mg at 04/18/24 0511    Phosphorus Replacement - Follow Nurse / BPA Driven Protocol   Does not apply PRN Roger, Sandeep HECTOR MD        polyethylene glycol (MIRALAX) packet 17 g  17 g Oral Daily Duran, Sandeep HECTOR MD        Potassium Replacement - Follow Nurse / BPA Driven Protocol   Does not apply PRN Duran, Sandeep HECTOR MD        propofol (DIPRIVAN) infusion 10 mg/mL 100 mL  5-50 mcg/kg/min Intravenous Continuous PRN Duran, Sandeep HECTOR MD        QUEtiapine (SEROquel) tablet 200 mg  200 mg Oral Nightly Duran, Sandeep HECTOR MD   200 mg at 04/17/24 2338    sertraline (ZOLOFT) tablet 200 mg  200 mg Oral Daily Duran, Sandeep HECTOR MD         "vancomycin IVPB 1500 mg in 0.9% NaCl (Premix) 500 mL  1,500 mg Intravenous Q12H Duran, Sandeep HECTOR .3 mL/hr at 04/17/24 1938 1,500 mg at 04/17/24 1938     Orders (all)        Start     Ordered    04/20/24 0600  XR Chest PA & Lateral  1 Time Imaging         04/17/24 1335    04/19/24 1200  Remove All Dressings 48 Hours Post-Op  Once         04/17/24 1335    04/19/24 1200  Leave Incisions Open to Air Unless Draining or Edges Are Not Approximated  Continuous         04/17/24 1335    04/19/24 0900  aspirin EC tablet 81 mg  Daily         04/17/24 1335    04/19/24 0600  Wound Care  Daily       04/17/24 1335    04/19/24 0600  CBC (No Diff)  Daily       04/17/24 1335    04/19/24 0600  Basic Metabolic Panel  Daily       04/17/24 1335    04/19/24 0000  Magnesium  Morning Draw         04/18/24 0347    04/18/24 2100  atorvastatin (LIPITOR) tablet 20 mg  Nightly         04/17/24 1335    04/18/24 1400  Intake & Output  Every Shift       04/17/24 1335    04/18/24 1321  acetaminophen (TYLENOL) tablet 650 mg  Every 4 Hours PRN,   Status:  Discontinued        Placed in \"Or\" Linked Group    04/17/24 1335    04/18/24 1321  acetaminophen (TYLENOL) 160 MG/5ML oral solution 650 mg  Every 4 Hours PRN,   Status:  Discontinued        Placed in \"Or\" Linked Group    04/17/24 1335    04/18/24 1321  acetaminophen (TYLENOL) suppository 650 mg  Every 4 Hours PRN,   Status:  Discontinued        Placed in \"Or\" Linked Group    04/17/24 1335    04/18/24 1200  Vital Signs  Every 4 Hours      Comments: Perform Vitals Less Frequently Only if Patient Stable      04/17/24 1335    04/18/24 0900  ARIPiprazole (ABILIFY) tablet 5 mg  Daily         04/17/24 1324    04/18/24 0900  clonazePAM (KlonoPIN) tablet 0.5 mg  Every 12 Hours Scheduled         04/17/24 1324    04/18/24 0900  sertraline (ZOLOFT) tablet 200 mg  Daily         04/17/24 1324    04/18/24 0900  aspirin chewable tablet 162 mg  Once         04/17/24 1335    04/18/24 0900  metoprolol tartrate " (LOPRESSOR) tablet 12.5 mg  Every 12 Hours Scheduled         04/17/24 1335    04/18/24 0900  bisacodyl (DULCOLAX) EC tablet 10 mg  2 Times Daily         04/17/24 1335    04/18/24 0900  polyethylene glycol (MIRALAX) packet 17 g  Daily         04/17/24 1335    04/18/24 0900  Enoxaparin Sodium (LOVENOX) syringe 40 mg  Daily         04/17/24 1335    04/18/24 0800  Wean & Extubate Per Rapid Wean and Extubation Protocol  Every Morning,   Status:  Canceled       04/17/24 1335    04/18/24 0800  Wean FiO2 per Respiratory Therapist for SpO2  for SpO2 >92%, until at 30 % FiO2  Every Morning,   Status:  Canceled      Comments: Wean FiO2 per Respiratory Therapist for SpO2  for SpO2 >92%, until at 30 % FiO2    04/17/24 1335 04/18/24 0800  Wean Respiratory Rate by a minimum of 2 every hour if indicated until the rate of 4 is achieved.  Every Morning,   Status:  Canceled      Comments: Wean Respiratory Rate by a minimum of 2 every hour if indicated until the rate of 4 is achieved.    04/17/24 1335 04/18/24 0600  XR Chest 1 View  Daily       04/17/24 1335    04/18/24 0600  ECG 12 Lead Other; s/p aortic root replacement  Daily       04/17/24 1335    04/18/24 0600  pantoprazole (PROTONIX) EC tablet 40 mg  Every Early Morning         04/17/24 1339    04/18/24 0600  acetaminophen (TYLENOL) tablet 650 mg  Every 6 Hours Scheduled         04/17/24 1343    04/18/24 0600  Comprehensive Metabolic Panel  Morning Draw,   Status:  Canceled         04/17/24 2048    04/18/24 0545  POC Glucose Once  PROCEDURE ONCE        Comments: Complete no more than 45 minutes prior to patient eating      04/18/24 0523    04/18/24 0445  magnesium sulfate 4g/100mL (PREMIX) infusion  Once         04/18/24 0347    04/18/24 0409  Calcium, Ionized  PROCEDURE ONCE         04/18/24 0410    04/18/24 0400  Chlorhexidine Gluconate Cloth 2 % pads 1 Application  Every 24 Hours         04/17/24 1350    04/18/24 0348  Calcium, Ionized  Once         04/18/24 0348     04/18/24 0314  POC Glucose Once  PROCEDURE ONCE        Comments: Complete no more than 45 minutes prior to patient eating      04/18/24 0252    04/18/24 0309  Manual Differential  Once,   Status:  Canceled         04/18/24 0308    04/18/24 0300  CBC & Differential  Once         04/17/24 1335    04/18/24 0300  Renal Function Panel  Once         04/17/24 1335    04/18/24 0300  Magnesium  Once         04/17/24 1335    04/18/24 0300  Protime-INR  Once         04/17/24 1335    04/18/24 0300  CBC Auto Differential  PROCEDURE ONCE         04/17/24 1901    04/18/24 0218  POC Glucose Once  PROCEDURE ONCE        Comments: Complete no more than 45 minutes prior to patient eating      04/18/24 0156    04/18/24 0007  POC Glucose Once  PROCEDURE ONCE        Comments: Complete no more than 45 minutes prior to patient eating      04/17/24 2349    04/17/24 2318  Blood Gas, Arterial -  PROCEDURE ONCE         04/17/24 2318    04/17/24 2214  POC Glucose Once  PROCEDURE ONCE        Comments: Complete no more than 45 minutes prior to patient eating      04/17/24 2152    04/17/24 2107  POC Glucose Once  PROCEDURE ONCE        Comments: Complete no more than 45 minutes prior to patient eating      04/17/24 2055    04/17/24 2100  QUEtiapine (SEROquel) tablet 200 mg  Nightly         04/17/24 1324    04/17/24 2049  STAT Lactic Acid, Reflex  PROCEDURE ONCE,   Status:  Canceled         04/17/24 1820    04/17/24 2013  POC Glucose Once  PROCEDURE ONCE        Comments: Complete no more than 45 minutes prior to patient eating      04/17/24 1952    04/17/24 1930  vancomycin IVPB 1500 mg in 0.9% NaCl (Premix) 500 mL  Every 12 Hours         04/17/24 1347    04/17/24 1902  POC Glucose Once  PROCEDURE ONCE        Comments: Complete no more than 45 minutes prior to patient eating      04/17/24 1850    04/17/24 1800  Ambulate Patient  3 Times Daily         04/17/24 1335    04/17/24 1800  mupirocin (BACTROBAN) 2 % nasal ointment  Every 12 Hours          04/17/24 1335    04/17/24 1800  chlorhexidine (PERIDEX) 0.12 % solution 15 mL  Every 12 Hours         04/17/24 1335    04/17/24 1800  POC Glucose Once  PROCEDURE ONCE        Comments: Complete no more than 45 minutes prior to patient eating      04/17/24 1748    04/17/24 1720  Blood Gas, Arterial -  Once        Comments: if no ABG has been obtained in the previous 4 hours during Vent Liberation      04/17/24 1335    04/17/24 1654  POC Glucose Once  PROCEDURE ONCE        Comments: Complete no more than 45 minutes prior to patient eating      04/17/24 1640    04/17/24 1636  STAT Lactic Acid, Reflex  PROCEDURE ONCE         04/17/24 1418    04/17/24 1600  Check Peripheral Pulses Every 4 Hours  Every 4 Hours       04/17/24 1335    04/17/24 1551  Blood Gas, Arterial -  PROCEDURE ONCE         04/17/24 1552    04/17/24 1549  POC Glucose Once  PROCEDURE ONCE        Comments: Complete no more than 45 minutes prior to patient eating      04/17/24 1537    04/17/24 1500  ipratropium-albuterol (DUO-NEB) nebulizer solution 3 mL  4 Times Daily - RT         04/17/24 1335    04/17/24 1446  POC Glucose Once  PROCEDURE ONCE        Comments: Complete no more than 45 minutes prior to patient eating      04/17/24 1431    04/17/24 1430  chlorhexidine (PERIDEX) 0.12 % solution 15 mL  Every 12 Hours Scheduled,   Status:  Discontinued         04/17/24 1335    04/17/24 1430  insulin regular (HumuLIN R,NovoLIN R) 100 Units in sodium chloride 0.9 % 100 mL (1 Units/mL) infusion  Titrated        Note to Pharmacy: Upon initiation of Glucommander insulin drip, make sure all other insulin orders and anti-diabetic medications have been discontinued.    04/17/24 1335    04/17/24 1430  dexmedetomidine (PRECEDEX) 400 mcg in 100 mL NS infusion  Titrated         04/17/24 1335    04/17/24 1400  Vital Signs Every Hour Until 24 Hours Post Op  Every Hour      Comments: Perform Vitals Less Frequently Only if Patient Stable      04/17/24 1335    04/17/24 1400   Check Peripheral Pulses Every 1 Hour x4  Every Hour       04/17/24 1335    04/17/24 1400  Intake & Output Every 15 Minutes x2, Every 30 Minutes x4  Every Hour       04/17/24 1335    04/17/24 1400  Intake & Output Every Hour Until 24 Hours Post Op  Every Hour       04/17/24 1335    04/17/24 1400  Cardiac Output Parameters On Admission, Then Every 1 Hour x4  Every Hour       04/17/24 1335    04/17/24 1400  Cardiac Output Parameters Every 2 Hours Until 24 Hours Post Op  Every 2 Hours       04/17/24 1335    04/17/24 1400  Nurse and RT to Assess Patient for Readiness to Wean Criteria as Follows:  Every Hour,   Status:  Canceled      Comments: Patient is awake and following commands, Patient is spontaneously breathing, respiratory rate <25/min, Patient's hemodynamics are stable, Patient has no evidence of clinically significant bleeding, SpO2 >92% on <30% FiO2, PEEP < or = to 8.    04/17/24 1335    04/17/24 1400  acetaminophen (OFIRMEV) injection 1,000 mg  Every 8 Hours Scheduled         04/17/24 1335    04/17/24 1350  Calcium, Ionized  PROCEDURE ONCE         04/17/24 1350    04/17/24 1348  Blood Gas, Arterial With Co-Ox  PROCEDURE ONCE         04/17/24 1349    04/17/24 1343  Prepare Cryoprecipitate 5 Pack, 1 Units  Blood - Once         04/17/24 1342    04/17/24 1342  Transfuse Cryoprecipitate  Transfusion         04/17/24 1342    04/17/24 1342  Verify Informed Consent for Blood Product Administration  Once         04/17/24 1342    04/17/24 1342  Prepare Platelet Pheresis, 1 Units  Blood - Once         04/17/24 1342    04/17/24 1341  Transfuse Pheresed Platelets  Transfusion         04/17/24 1342    04/17/24 1337  CBC Auto Differential  Once         04/17/24 1336    04/17/24 1336  Code Status and Medical Interventions:  Continuous         04/17/24 1335    04/17/24 1336  Vital Signs & Post-Op Checks Every 15 Minutes x2, Every 30 Minutes x4  Per Hospital Policy        Comments: Perform Vitals Less Frequently Only if Patient  Stable    04/17/24 1335    04/17/24 1336  Daily Weights  Daily       04/17/24 1335    04/17/24 1336  Continuous Cardiac Monitoring  Continuous        Comments: Follow Standing Orders As Outlined in Process Instructions (Open Order Report to View Full Instructions)    04/17/24 1335    04/17/24 1336  Telemetry - Maintain IV Access  Continuous         04/17/24 1335    04/17/24 1336  Telemetry - Place Orders & Notify Provider of Results When Patient Experiences Acute Chest Pain, Dysrhythmia or Respiratory Distress  Until Discontinued         04/17/24 1335    04/17/24 1336  May Be Off Telemetry for Tests  Continuous         04/17/24 1335    04/17/24 1336  Continuous Pulse Oximetry  Continuous         04/17/24 1335    04/17/24 1336  Discontinue NG After Extubation  Once         04/17/24 1335    04/17/24 1336  Chest & Mediastinal Tubes to 20cm Continuous Suction  Once         04/17/24 1335    04/17/24 1336  Begin Rewarming with Thermal Unit As Needed For Temp Less Than 96F  Once         04/17/24 1335    04/17/24 1336  ACE Wraps to Vein [place] Donor Site for 24 Hours, Then Apply ANDREW Hose  Continuous         04/17/24 1335    04/17/24 1336  Wound Dressing  Continuous         04/17/24 1335    04/17/24 1336  Notify Surgeon if Drainage From Surgical Incision Site  Until Discontinued         04/17/24 1335    04/17/24 1336  ISOLATE PACER WIRES  Continuous         04/17/24 1335    04/17/24 1336  Turn Patient Every 2 Hours or Begin Bed Rotation Once Hemodynamically Stable  Now Then Every 2 Hours         04/17/24 1335    04/17/24 1336  Advance PROM to AROM  Now Then Every 4 Hours         04/17/24 1335    04/17/24 1336  Up in Chair for Meals  Until Discontinued         04/17/24 1335    04/17/24 1336  Notify Provider - Urine Output  Until Discontinued         04/17/24 1335    04/17/24 1336  Notify Provider - Chest Tube Output  Until Discontinued         04/17/24 1335    04/17/24 1336  Notify Provider (Specify)  Until Discontinued          04/17/24 1335    04/17/24 1336  Cardiac Sternal Precautions - Maintain at All Times & Teach Patient  Continuous        Comments: Maintain Sternal Precautions at All Times & Teach Patient    04/17/24 1335    04/17/24 1336  Fall Precautions  Continuous         04/17/24 1335    04/17/24 1336  Initial Ventilator Settings Per Pulmonologist / Anesthesiologist  Once         04/17/24 1335    04/17/24 1336  Incentive Spirometry  Every Hour While Awake       04/17/24 1335 04/17/24 1336  Ventilator - Vent Mode: Alternate (Custom) Mode: See Comments  Continuous,   Status:  Canceled        Comments: Initial Mechanical Ventilation Settings upon Arrival to the ICU: SIMV, Tidal Volume 5-7 mL/kg ideal body weight based on height formula, Respiratory Rate 14-16/min, PEEP 5 cmH2O if <90kg, PEEP 8 cmH2O if >90kg, FiO2 60%.    04/17/24 1335 04/17/24 1336  Ventilator - Vent Mode: Alternate (Custom) Mode: See Comments  Continuous,   Status:  Canceled        Comments: RT adjusts Mechanical Ventilation as needed to achieve: pH 7.35-7.45, pCO2 34-45 mmHg, PaO2 > 90 mmHg, SpO2 > 92%.    04/17/24 1335 04/17/24 1336  Extubate Patient If  All of the Following Criteria are Met:  Once        Comments: Patient tolerated Spontaneous Breathing Trial for 20 minutes, respiratory rate < 25/min, SpO2 >94%, patient remains hemodynamically stable, patient is awake and following commands, RSBI f/vt < 90, Elevate HOB > 35 degrees, Vital Capacity: 10-15 mL/kg, NIF >-25 cm H2O, minute ventilation <14 l/min, Obtain ABG: pH of 7.33 to 7.55, confirm with nurse if ok to extubate. Prior to extubation, Propofol must be off, ok to proceed with Precedex with provider order. If criteria NOT met: Wait 1 hour, reassess. If still does not meet criteria: call Physician.    04/17/24 1335 04/17/24 1336  Notify Physician if Vapotherm Mask Needed to Keep SpO2 Greater Than 90% or Continuing Respiratory Distress  Until Discontinued        Comments: Notify  Physician if Vapotherm mask needed to keep SpO2 >90% or continuing respiratory distress.r    04/17/24 1335    04/17/24 1336  Call Physician if Racemic needed.  Until Discontinued        Comments: Call Physician if Racemic needed.    04/17/24 1335    04/17/24 1336  RT To Evaluate & Demonstrate Use of IS  Once        Comments: RT To Evaluate & Demonstrate Use of IS    04/17/24 1335    04/17/24 1336  XR Chest 1 View  1 Time Imaging         04/17/24 1335    04/17/24 1336  ECG 12 Lead Other; s/p aortic root replacement  STAT         04/17/24 1335    04/17/24 1336  Protime-INR  STAT         04/17/24 1335    04/17/24 1336  aPTT  STAT         04/17/24 1335    04/17/24 1336  Calcium, Ionized  STAT         04/17/24 1335    04/17/24 1336  Blood Gas, Arterial -  STAT         04/17/24 1335    04/17/24 1336  CBC & Differential  STAT         04/17/24 1335    04/17/24 1336  Fibrinogen  STAT         04/17/24 1335    04/17/24 1336  CBC (No Diff)  Now Then Every 4 Hours       04/17/24 1335    04/17/24 1336  Renal Function Panel  Now Then Every 4 Hours       04/17/24 1335    04/17/24 1336  Draw Labs and Notify Provider if Chest Tube Output Greater Than 100mL/hr  Until Discontinued         04/17/24 1335    04/17/24 1336  Advance Diet As Tolerated -  Until Discontinued         04/17/24 1335    04/17/24 1336  Cardiac Rehab Evaluation  Once        Provider:  (Not yet assigned)    04/17/24 1335    04/17/24 1336  Consult to Case Management /   Once        Provider:  (Not yet assigned)    04/17/24 1335    04/17/24 1336  PT Consult: Eval & Treat  Once         04/17/24 1335    04/17/24 1336  Place Sequential Compression Device  Once         04/17/24 1335    04/17/24 1336  Maintain Sequential Compression Device  Continuous         04/17/24 1335    04/17/24 1336  RN to Release PRN POC Glucose Orders Per Glucommander  Continuous         04/17/24 1335    04/17/24 1336  RN to Order STAT Glucose for BG Less Than 10 mg/dl or Greater  "Than 600 mg/dl  Continuous        Comments: Do not delay treatment of unstable patient in order to obtain glucose sample from Lab.   Inform provider of results.    04/17/24 1335    04/17/24 1336  Prior to Initiating Glucommander™, Ensure All Prior Insulin Orders are Discontinued  Once         04/17/24 1335    04/17/24 1336  PRIOR to START of Intravenous Insulin Infusion, Notify Provider if K+ is Less Than 3.3, for Extra Potassium Orders, if Indicated. Do Not Start Insulin Drip if K+ Less Than 3.3.  Per Order Details         04/17/24 1335    04/17/24 1336  Use a Dedicated Line for Insulin Infusion (If Possible).  May Use a Carrier Fluid of NS at KVO Rate if Insulin Rate is Insufficient to Maintain IV Patency.  Prime IV Line With Insulin Infusion  Continuous         04/17/24 1335    04/17/24 1336  If Insulin Infusion is Discontinued, Glucommander Must Also be Discontinued. If Insulin Infusion is Re-Ordered Discontinue Previous Settings in Glucommander & Start New  Per Order Details         04/17/24 1335    04/17/24 1336  Patient is on Glucommander  Continuous         04/17/24 1335    04/17/24 1336  Notify Provider  Continuous        Comments: Open Order Report to View Parameters Requiring Provider Notification    04/17/24 1335    04/17/24 1336  If Patient Has Diet Order or Bolus Tube Feeds Utilize the Start Meal / Meal Bolus Feature in Glucommander  Continuous         04/17/24 1335    04/17/24 1336  NPO Diet NPO Type: Sips with Meds  Diet Effective Now         04/17/24 1335    04/17/24 1335  dextrose 5 % with KCl 20 mEq/L infusion  Continuous        Placed in \"And\" Linked Group    04/17/24 1335    04/17/24 1335  dextrose 5 % with KCl 20 mEq/L infusion  Continuous        Placed in \"And\" Linked Group    04/17/24 1335    04/17/24 1335  albumin human 5 % solution 500 mL  As Needed         04/17/24 1335    04/17/24 1335  propofol (DIPRIVAN) infusion 10 mg/mL 100 mL  Continuous PRN         04/17/24 1335    04/17/24 1335  " meperidine (DEMEROL) injection 25 mg  Every 1 Hour PRN         04/17/24 1335    04/17/24 1335  Potassium Replacement - Follow Nurse / BPA Driven Protocol  As Needed         04/17/24 1335    04/17/24 1335  Magnesium Cardiology Dose Replacement - Follow Nurse / BPA Driven Protocol  As Needed         04/17/24 1335    04/17/24 1335  Phosphorus Replacement - Follow Nurse / BPA Driven Protocol  As Needed         04/17/24 1335    04/17/24 1335  Calcium Replacement - Follow Nurse / BPA Driven Protocol  As Needed         04/17/24 1335    04/17/24 1335  ondansetron (ZOFRAN) injection 4 mg  Every 6 Hours PRN         04/17/24 1335    04/17/24 1335  Pharmacy to dose vancomycin  Continuous PRN,   Status:  Discontinued         04/17/24 1335    04/17/24 1335  oxyCODONE (ROXICODONE) immediate release tablet 5 mg  Every 4 Hours PRN         04/17/24 1335    04/17/24 1335  oxyCODONE (ROXICODONE) immediate release tablet 10 mg  Every 4 Hours PRN         04/17/24 1335    04/17/24 1335  Morphine sulfate (PF) injection 2 mg  Every 30 Minutes PRN         04/17/24 1335    04/17/24 1335  niCARdipine (CARDENE) 25 mg in 250 mL NS infusion kit  Continuous PRN         04/17/24 1335    04/17/24 1335  norepinephrine (LEVOPHED) 8 mg in 250 mL NS infusion (premix)  Continuous PRN         04/17/24 1335    04/17/24 1335  clevidipine (CLEVIPREX) infusion 0.5 mg/mL  Continuous PRN         04/17/24 1335    04/17/24 1335  dextrose (GLUTOSE) oral gel 15 g  Every 15 Minutes PRN         04/17/24 1335    04/17/24 1335  dextrose (D50W) (25 g/50 mL) IV injection 10-50 mL  Every 15 Minutes PRN         04/17/24 1335    04/17/24 1335  glucagon (GLUCAGEN) injection 1 mg  Every 15 Minutes PRN         04/17/24 1335    04/17/24 1335  nitroglycerin (NITROSTAT) SL tablet 0.4 mg  Every 5 Minutes PRN         04/17/24 1335    04/17/24 1335  Begin Weaning When Criteria Met:  As Needed,   Status:  Canceled      Comments: If all of the weaning criteria are met, Respiratory  "Therapist is to initiate spontaneous breathing trial with PSV pressure support mode with PEEP 5 cmH2O if <90kg, PEEP 8 cmH2O if > or = 90kg. If criteria are not met, RT or nurse is to re-evaluate in 15-30 minutes.    04/17/24 1335    04/17/24 1335  albuterol (PROVENTIL) nebulizer solution 0.083% 2.5 mg/3mL  Every 4 Hours PRN         04/17/24 1335    04/17/24 1326  atorvastatin (LIPITOR) tablet 10 mg  Daily,   Status:  Discontinued         04/17/24 1324    04/17/24 1324  Lactic Acid, Plasma  STAT         04/17/24 1324    04/17/24 1324  Target Arousal Level RASS -1 to -2  Continuous         04/17/24 1324    04/17/24 1320  Inpatient Admission  Once         04/17/24 1324    04/17/24 1320  Continue Indwelling Urinary Catheter  Once        Placed in \"And\" Linked Group    04/17/24 1324    04/17/24 1320  Assess Need for Indwelling Urinary Catheter - Follow Removal Protocol  Continuous        Comments: Indwelling Urinary Catheter Removal Criteria  Discontinue Indwelling Urinary Catheter Unless One of the Following is Present  Urinary Retention or Obstruction  Chronic Mccoy Catheter Use  End of Life  Critical Illness with Strict I/O   Tract or Abdominal Surgery  Stage 3/4 Sacral / Perineal Wound  Required Activity Restriction: Trauma  Required Activity Restriction: Spine Surgery  If Patient is Being Followed by Urology Contact Them PRIOR to Removal  Do Not Remove Indwelling Urinary Catheter Order is Present with a CLINICAL REASON to Maintain the Catheter. Provider is Required to Include a Clinical Reason to Maintain a Urinary Catheter    Chronic Mccoy Catheter Use (Present on Admission)  Assess for Continued Need & Document Medical Necessity  If Infection is Suspected, Contact the Provider       Placed in \"And\" Linked Group    04/17/24 1324    04/17/24 1320  Urinary Catheter Care  Every Shift      Placed in \"And\" Linked Group    04/17/24 1324    04/17/24 1229  CBC (No Diff)  RELEASE UPON ORDERING         04/17/24 1229    " 04/17/24 1229  Fibrinogen  RELEASE UPON ORDERING         04/17/24 1229    04/17/24 1229  Protime-INR  RELEASE UPON ORDERING         04/17/24 1229    04/17/24 1224  Blood Gas, Arterial With Co-Ox  PROCEDURE ONCE         04/17/24 1222    04/17/24 1102  Blood Gas, Arterial With Co-Ox  PROCEDURE ONCE         04/17/24 1059    04/17/24 1041  Tissue Pathology Exam  RELEASE UPON ORDERING         04/17/24 1041    04/17/24 1035  Blood Gas, Arterial With Co-Ox  PROCEDURE ONCE         04/17/24 1033    04/17/24 0905  Blood Gas, Arterial With Co-Ox  PROCEDURE ONCE         04/17/24 0903    04/17/24 0900  sodium chloride 0.9 % flush 3 mL  Every 12 Hours Scheduled,   Status:  Discontinued         04/17/24 0543    04/17/24 0900  sodium chloride 0.9 % flush 10 mL  Every 12 Hours Scheduled,   Status:  Discontinued         04/17/24 0543    04/17/24 0858  methylPREDNISolone sodium succinate (SOLU-Medrol) 500 mg in sodium chloride 0.9 % 100 mL IVPB  Once         04/17/24 0856    04/17/24 0835  Blood Gas, Arterial With Co-Ox  PROCEDURE ONCE         04/17/24 0832    04/17/24 0820  bupivacaine liposome 20 mL mixture  As Needed,   Status:  Discontinued         04/17/24 0820    04/17/24 0819  sodium chloride 1,000 mL with vancomycin 1,000 mg irrigation  As Needed,   Status:  Discontinued         04/17/24 0819    04/17/24 0818  thrombin topical  As Needed,   Status:  Discontinued         04/17/24 0818    04/17/24 0818  bupivacaine (PF) 0.5 % 30 mL, EPINEPHrine PF 0.15 mL mixture  As Needed,   Status:  Discontinued         04/17/24 0819    04/17/24 0817  sodium chloride (NS) irrigation solution  As Needed,   Status:  Discontinued         04/17/24 0817    04/17/24 0817  sterile water irrigation solution  As Needed,   Status:  Discontinued         04/17/24 0817    04/17/24 0817  electrolyte-A 1,000 mL with heparin (porcine) 1,000 Units mixture  As Needed,   Status:  Discontinued         04/17/24 0818    04/17/24 0752  Blood Gas, Arterial With  Co-Ox  PROCEDURE ONCE         04/17/24 0750    04/17/24 0642  OR Blood Pickup  Once,   Status:  Canceled         04/17/24 0642    04/17/24 0633  Intra-Op TAVR Transesophageal Echo  Once         04/17/24 0633    04/17/24 0545  lactated ringers infusion  Continuous,   Status:  Discontinued         04/17/24 0543    04/17/24 0545  insulin regular (HumuLIN R,NovoLIN R) 100 Units in sodium chloride 0.9 % 100 mL (1 Units/mL) infusion  Titrated,   Status:  Discontinued        Note to Pharmacy: Upon initiation of Glucommander insulin drip, make sure all other insulin orders and anti-diabetic medications have been discontinued.    04/17/24 0543    04/17/24 0545  acetaminophen (TYLENOL) tablet 1,000 mg  Once         04/17/24 0543    04/17/24 0545  mupirocin (BACTROBAN) 2 % nasal ointment  Once         04/17/24 0543    04/17/24 0545  ceFAZolin 2000 mg IVPB in 100 mL NS (MBP)  Once,   Status:  Discontinued         04/17/24 0543    04/17/24 0545  lactated ringers infusion 1,000 mL  Continuous,   Status:  Discontinued         04/17/24 0543    04/17/24 0544  Follow Anesthesia Guidelines / Protocol  Once,   Status:  Canceled         04/17/24 0543    04/17/24 0544  Insert Peripheral IV  Once,   Status:  Canceled         04/17/24 0543    04/17/24 0544  Maintain IV Access  Continuous,   Status:  Canceled         04/17/24 0543    04/17/24 0543  lidocaine PF 1% (XYLOCAINE) injection 0.5 mL  Once As Needed,   Status:  Discontinued         04/17/24 0543    04/17/24 0543  sodium chloride 0.9 % flush 3 mL  As Needed,   Status:  Discontinued         04/17/24 0543    04/17/24 0543  Vital Signs - Per Anesthesia Protocol  As Needed,   Status:  Canceled       04/17/24 0543    04/17/24 0543  Oxygen Therapy- Nasal Cannula; Titrate 1-6 LPM Per SpO2; 90 - 95%  Continuous,   Status:  Canceled         04/17/24 0543    04/17/24 0543  Continuous Pulse Oximetry  Continuous,   Status:  Canceled         04/17/24 0543    04/17/24 0543  Insert Peripheral IV   Once,   Status:  Canceled         04/17/24 0543    04/17/24 0543  Saline Lock & Maintain IV Access  Continuous,   Status:  Canceled         04/17/24 0543    04/17/24 0543  sodium chloride 0.9 % flush 3-10 mL  As Needed,   Status:  Discontinued         04/17/24 0543 04/17/24 0543  sodium chloride 0.9 % infusion 40 mL  As Needed,   Status:  Discontinued         04/17/24 0543    04/17/24 0543  midazolam (VERSED) injection 2 mg  Every 10 Minutes PRN,   Status:  Discontinued         04/17/24 0543    04/17/24 0543  Follow Anesthesia Guidelines / Protocol  Once,   Status:  Canceled         04/17/24 0543    04/17/24 0543  STS Risk Score has been calculated and discussed with the patient and family  Continuous,   Status:  Canceled         04/17/24 0543    04/17/24 0543  Obtain Informed Consent  Once,   Status:  Canceled         04/17/24 0543    04/17/24 0543  Verify NPO Status  Continuous,   Status:  Canceled         04/17/24 0543    04/17/24 0543  Place Sequential Compression Device  Once         04/17/24 0543 04/17/24 0543  Chlorhexidine Shower / Bath After Skin Prep  Once,   Status:  Canceled        Comments: Chlorhexidine Skin Prep and Instructions For All Patients Having A Procedure Requiring an Outward Incision if Not Allergic.  If Allergic, Give Antibacterial Skin Wipes and Instructions.  Do Not Use For Facial Cases or on Any Mucus Membranes.    04/17/24 0543 04/17/24 0543  Clip Hair Chin to Ankles  Once,   Status:  Canceled         04/17/24 0543    04/17/24 0543  Additional Pre-Op Care Order / Instruction  Continuous,   Status:  Canceled         04/17/24 0543    04/17/24 0543  Notify Physician - Standard  Until Discontinued,   Status:  Canceled         04/17/24 0543    04/17/24 0543  Prepare RBC, 2 Units  Blood - Once         04/17/24 0543    04/17/24 0543  Prepare Platelet Pheresis, 1 Units  Blood - Once,   Status:  Canceled         04/17/24 0543    04/17/24 0543  Prepare Fresh Frozen Plasma, 2 Units  Blood  - Once,   Status:  Canceled         04/17/24 0543    04/17/24 0543  Insert Peripheral IV x2  Once,   Status:  Canceled         04/17/24 0543    04/17/24 0543  Saline Lock & Maintain IV Access  Continuous,   Status:  Canceled         04/17/24 0543    04/17/24 0543  sodium chloride 0.9 % flush 10 mL  As Needed,   Status:  Discontinued         04/17/24 0543    04/17/24 0543  sodium chloride 0.9 % infusion 40 mL  As Needed,   Status:  Discontinued         04/17/24 0543    04/17/24 0543  sodium chloride 0.9 % flush 30 mL  Once As Needed,   Status:  Discontinued         04/17/24 0543    04/17/24 0543  sodium chloride 0.9 % infusion  Continuous PRN,   Status:  Discontinued         04/17/24 0543    04/17/24 0543  dextrose (GLUTOSE) oral gel 15 g  Every 15 Minutes PRN,   Status:  Discontinued         04/17/24 0543    04/17/24 0543  dextrose (D50W) (25 g/50 mL) IV injection 10-50 mL  Every 15 Minutes PRN,   Status:  Discontinued         04/17/24 0543    04/17/24 0543  glucagon (GLUCAGEN) injection 1 mg  Every 15 Minutes PRN,   Status:  Discontinued         04/17/24 0543    04/15/24 1601  Oxygen Therapy- Nasal Cannula; Titrate 1-6 LPM Per SpO2; 90 - 95%  Continuous,   Status:  Canceled         04/15/24 1600    Unscheduled  Check Peripheral Pulses As Needed  As Needed       04/17/24 1335    Unscheduled  Cardiac Output Parameters PRN Until 24 Hours Post-Op  As Needed       04/17/24 1335    Unscheduled  Insert Nasogastric Tube If Indicated & Not Already in Place  As Needed      Comments: Indications: Nausea, Vomiting, Prolonged Intubation or to Administer Medications  Attach to Low Wall Suction if Any Residual    04/17/24 1335    Unscheduled  Wound Care  As Needed       04/17/24 1335    Unscheduled  Dangle at Bedside After Extubation  As Needed       04/17/24 1335    Unscheduled  Up in Chair As Tolerated After Extubation  As Needed       04/17/24 1335    Unscheduled  Oxygen Therapy- Nasal Cannula; 2 LPM  Continuous PRN        04/17/24 1335    Unscheduled  Blood Gas, Arterial -  As Needed      Comments: Obtain ABG as needed for ventilator changes      04/17/24 1335    Unscheduled  Blood Gas, Arterial -  As Needed      Comments: Prior to Extubation      04/17/24 1335    Unscheduled  Oxygen Therapy- Nasal Cannula; Titrate 1-6 LPM Per SpO2; 90 - 95%  Continuous PRN       04/17/24 1335    Unscheduled  CBC & Differential  As Needed        Comments: Chest Tube Drainage Greater Than 100mL/hr      04/17/24 1335    Unscheduled  aPTT  As Needed        Comments: Chest Tube Drainage Greater Than 100mL/hr      04/17/24 1335    Unscheduled  Protime-INR  As Needed        Comments: Chest Tube Drainage Greater Than 100mL/hr      04/17/24 1335    Unscheduled  Fibrin Split Products  As Needed        Comments: Chest Tube Drainage Greater Than 100mL/hr      04/17/24 1335    Unscheduled  Fibrinogen  As Needed        Comments: Chest Tube Drainage Greater Than 100mL/hr      04/17/24 1335    Unscheduled  Treat Hypoglycemia As Recommended By [employer]™ & Notify Provider of Treatment  As Needed      Comments: Follow Hypoglycemia Orders As Outlined in Process Instructions (Open Order Report to View Full Instructions)  Notify Provider Any Time Hypoglycemia Treatment is Administered    04/17/24 1335    Unscheduled  If Insulin Infusion is Paused - Follow [employer] Instructions  As Needed       04/17/24 1335    Unscheduled  POC Glucose PRN  As Needed      Comments: [employer] recommended POC testing will vary between 15 minutes and 2 hours.      04/17/24 1335                  Ventilator/Non-Invasive Ventilation Settings (From admission, onward)       Start     Ordered    04/17/24 1336  Ventilator - Vent Mode: Alternate (Custom) Mode: See Comments  (Initial Vent Settings upon arrival to ICU - PAD)  Continuous,   Status:  Canceled        Comments: Initial Mechanical Ventilation Settings upon Arrival to the ICU: SIMV, Tidal Volume 5-7 mL/kg ideal body weight based  on height formula, Respiratory Rate 14-16/min, PEEP 5 cmH2O if <90kg, PEEP 8 cmH2O if >90kg, FiO2 60%.   Question:  Vent Mode  Answer:  Alternate (Custom) Mode: See Comments    04/17/24 1335    04/17/24 1336  Ventilator - Vent Mode: Alternate (Custom) Mode: See Comments  (Within 30 Minutes Arrival to ICU - PAD)  Continuous,   Status:  Canceled        Comments: RT adjusts Mechanical Ventilation as needed to achieve: pH 7.35-7.45, pCO2 34-45 mmHg, PaO2 > 90 mmHg, SpO2 > 92%.   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 Ascending aorta and aortic root replacement with valved conduit and coronary reconstruction, Mechanical-Bentall Procedure; CPT 92619  2. Aortic Hemiarch Replacement with beveled open distal anastomosis under arch vessels with circulatory arrest; CPT +49122     Procedure Summary:   Aortic Root Replacement with Mechanical Bentall (27/29 On-X Valved Conduit with 26mm Graft), Ascending Aortic Replacement with Hemiarch Replacement with 26mm Side Branched Dacron graft        Surgeon:  Sandeep Duran MD  Assistants:  Lynne Nguyen CFA  Anesthesia Staff:  Refugio Garcia MD  Anesthesia Type:  General     Estimated Blood Loss:  Minimal (Cell Saver)     Drains:  1. 24 French Jeff drains x2-anterior and posterior mediastinum     Specimens:  Aorta and Aortic Valve     Operative Indications:   Mr. Castro     Operative Findings:        Very enlarged mid ascending aorta and aortic root, greater than 6cm in size  Bicuspid aortic valve with  fusion of the left and right coronary cusp, Melyssa 1, mild calcification at the base of each leaflet    Two ostiums of coronary arteries in the right cusp, they were reimplanted as one large RCA button     Debrided to clean annulus.  Aortic root replacement with a On-X valved conduit, 27/29 On-X valve in a Valsalva graft with 26 mm graft        Ascending aorta and hemiarch replacement with a beveled distal anastomosis underneath head vessels with circulatory arrest with retrograde cerebral perfusion, 26 mm side branched Dacron graft used      Transesophageal echocardiography showed normal LV and RV function beginning the case with bicuspid aortic valve with without significant valvular dysfunction, no significant MR.  At end of case well-seated aortic valve normal LV and RV function     Total Circulatory Arrest time (with RCP): 15 minutes   Total aortic cross-clamp time: 140 minutes  Total cardiac bypass time: 188 minutes     Operative description in detail:  The patient was taken to the operative suite where he was placed in a supine position.  Induction of general anesthesia and placement of a single-lumen endotracheal tube was performed without remark.  Appropriate arterial and venous access was established without remark.  A right IJ central venous catheter was placed followed by a [place] pulmonary artery catheter by anesthesia staff. The patient was then prepped and draped in the usual and sterile surgical fashion.  A timeout was performed.  Perioperative antibiotics were administered. Beta blocker was given.     Median sternotomy was performed in standard fashion. Hemostasis was ensured along sternal edges.  Midline mediastinal fat and thymus were divided and pericardium opened.  A pericardial well was created.  The distal ascending aorta and right atrial appendage were cannulated for cardiopulmonary bypass standard fashion.  Aortic line was tested and was satisfactory.  The SVC was cannulated for retrograde  cerebral perfusion in standard fashion.  A right superior pulmonary vein LV vent was placed in standard fashion.  A combined cardioplegia/aortic root vent set was secured with a horizontal mattress 4-0 Prolene suture.  Retrograde cardioplegia catheter was inserted into the coronary sinus to the free wall the right atrium.  With an appropriate ACT cardiopulmonary bypass was commenced.        Cooling was begun with a goal temperature of 22°C.  During cooling the ascending aorta was dissected out further until it was free from the pulmonary artery and arch vessels were identified.  With that, I proceeded to apply the aortic cross-clamp and administered cardioplegia utilizing standard cardioplegia in a retrograde fashion.  I then opened the aorta and placed a sutures at the top of each commissure and directly gave ostial coronary cardioplegia.  With this there was prompt diastolic arrest.  Total standard dose was given.  Cardioplegia was given every 15-20 mins via retrograde, antegrade and direct coronary administration.     I then began dissection of the aortic root.  The valve was inspected and was per above findings.  The aortic valve was excised in its entirety.  I then sharply developed left and right coronary buttons and tagged the buttons with pledgeted 4-0 Prolene sutures at the top.  The aortic root was then carefully dissected out down to the level of the annulus.  With this being achieved the patient's temperature was near 22 degrees and appropriate level for circulatory arrest.  An additional dose of cardioplegia was given.     Propofol was bolused until the BIS monitor had a reading of 0.  The head was packed in ice.  Steroids were given.  The cardiopulmonary bypass circuit was stopped aortic cannula removed and the cross-clamp removed.  We began RCP perfusion at 10 mL/min/kg.  The aorta was trimmed up to the previously marked hemiarch position.  The 26 mm branched Dacron graft was beveled and then trimmed  with an appropriate bevel for the distal anastomosis.  Distal anastomosis was constructed with a running 3-0 Prolene suture.  After completion of the anastomosis the aortic arch and graft was de-aired.  Cardiopulmonary bypass was resumed at full flow through the sidebranch of the ascending aortic graft.  Hemostasis was ensured along the distal anastomosis.  Suture line was reinforced with BioGlue.  After 5 minutes rewarming was begun.     We then returned attention to the aortic root.  The annulus was measured and measured to easily fit a 27/29 mm On-X valve.  Valved conduit was opened.  The aortic annulus had horizontal mattress 2-0 Tycron sutures placed around its entirety with pledgets on the ventricular side.  The sutures were then passed through the mechanical valved graft conduit.  It was seated and secured in place with cor knot suture securement.  The aortic root graft was trimmed.  We then measured the left button and marked its appropriate position on the graft.  Coronary button hole was created with eye cautery, left coronary was anastomosed to the aortic valve conduit with a running 5-0 Prolene suture.  We then turned our attention to the right coronary button, it was measured to appropriate position and marked on the aortic root graft.  Coronary buttonhole was created with eye cautery, right coronary was anastomosed to the aortic valve conduit with a running 5-0 Prolene suture.  The aortic root was test pressurized and there was adequate hemostasis at both buttons and a dose of cardioplegia was given.  Bioglue was used to reinforce the left coronary button suture lines.     I then measured the ascending aortic graft and aortic root graft to appropriate length and trimmed appropriately.  A graft to graft anastomosis was created with a running 4-0 Prolene suture.  With that being accomplished aortic root vent was secured in place just above the graft to graft anastomosis.  I did reinforce the suture line  with Bio-Glue.     With that being accomplished, a terminal hotshot was administered.  The patient was placed in trendelenburg position.  Upon completion of terminal hotshot and placement of temporary epicardial pacing wires, with the aortic vent on high and pump flows diminished, the aortic cross-clamp was released.  With that, full support was implemented.  A nonworking beating phase was implemented.  Ventilation restored.  The retrograde cardioplegia catheter was removed and site oversewn as a matter of routine.       While on cardiopulmonary bypass, vent in the right superior pulmonary vein was removed and there was adequate hemostasis.  With all in readiness, the heart was allowed to fill and then weaned off cardiopulmonary bypass.  He  from bypass well without problem.  With this there was excellent hemostasis.  We removed the aortic root vent/cardioplegia cannula set once we ensured no intracardiac air on echocardiogram.  Its associated pursestring suture was tied securely.  I decannulated both venous lines and snared down their associated pursestring sutures.  Systemic intravenous protamine was administered.  All associated blood volume was returned to the patient.  With continued good hemodynamics, I divided the arterial line by securing suture around the sidebranch of the aortic graft.  At this time I tied down the previously snared venous pursestring suture.  The mediastinum was drained with 24 Amharic Jeff drains placed anteriorly and posteriorly.  I surveyed the chest and hemostasis was pristine.  The sternum was reapproximated with stainless sterile wires placed in an interrupted fashion.  In layers anatomically the soft tissue planes were reapproximated. Instruments, sharps, and sponge counts were reported as correct.      Complications: None     Disposition: Transferred to ICU in stable and guarded condition.     Sandeep Duran M.D.  Cardiothoracic Surgeon    Electronically signed by Roger  Sandeep HECTOR MD at 04/17/24 1335       Physician Progress Notes (last 72 hours)  Notes from 04/15/24 0548 through 04/18/24 0548   No notes of this type exist for this encounter.       Consult Notes (last 48 hours)  Notes from 04/16/24 0548 through 04/18/24 0548   No notes of this type exist for this encounter.

## 2024-04-18 NOTE — THERAPY TREATMENT NOTE
Acute Care - Physical Therapy Treatment Note  [facility]     Patient Name: Nic Castro  : 1975  MRN: [id number]  Today's Date: 2024      Visit Dx:     ICD-10-CM ICD-9-CM   1. Impaired mobility [Z74.09]  Z74.09 799.89   2. Aneurysm of the ascending aorta, without rupture  I71.21 441.2     Patient Active Problem List   Diagnosis    Aneurysm of the ascending aorta, without rupture    Chest pain    Dyslipidemia    Tobacco use    Overweight with body mass index (BMI) of 28 to 28.9 in adult    Aortic root aneurysm     Past Medical History:   Diagnosis Date    AAA (abdominal aortic aneurysm)     Anxiety     Arthritis     GERD (gastroesophageal reflux disease)     Hepatitis C     Hyperlipidemia     Hypertension     Pancreatitis     Seizure     from head injury    Substance abuse      Past Surgical History:   Procedure Laterality Date    ANKLE LIGAMENT RECONSTRUCTION Left 2015    ASCENDING ARCH/HEMIARCH REPLACEMENT N/A 2024    Procedure: AORTIC ROOT REPLACEMENT, ASCENDING AORTIC HEMIARCH REPLACEMENT, AORTIC VALVE REPLACEMENT WITH MECHANICAL VALVE WITH CIRCULATORY ARREST, TRANSESOPHAGEAL ECHOCARDIOGRAM;  Surgeon: Sandeep Duran MD;  Location: [facility] OR;  Service: Cardiothoracic;  Laterality: N/A;    CARDIAC CATHETERIZATION N/A 2023    Procedure: Left Heart Cath;  Surgeon: Nikolas Ramos MD;  Location: [facility] CATH INVASIVE LOCATION;  Service: Cardiology;  Laterality: N/A;    TOOTH EXTRACTION      Full mouth extraction     PT Assessment (Last 12 Hours)       PT Evaluation and Treatment       Row Name 24 3564          Physical Therapy Time and Intention    Subjective Information complains of;pain  -     Document Type therapy note (daily note)  -     Mode of Treatment physical therapy  -     Comment little impulsive-uses arms too much.  -       Row Name 24 1307          General Information    Existing Precautions/Restrictions fall;sternal  chest tube  -       Row Name 24  1309          Pain    Pretreatment Pain Rating 7/10  -     Posttreatment Pain Rating 7/10  -     Pain Location incisional  -     Pain Location - chest  -     Pain Intervention(s) Repositioned;Medication (See MAR);Ambulation/increased activity  -       Row Name 04/18/24 1309          Bed Mobility    Comment, (Bed Mobility) chair  -       Row Name 04/18/24 1309          Transfers    Comment, (Transfers) stood impulsively from chair during warm ups-pushed with him arms  -       Row Name 04/18/24 1309          Sit-Stand Transfer    Sit-Stand [place] (Transfers) standby assist  -       Row Name 04/18/24 1309          Stand-Sit Transfer    Stand-Sit [place] (Transfers) standby assist  -       Row Name 04/18/24 1309          Gait/Stairs (Locomotion)    [place] Level (Gait) contact guard  -     Distance in Feet (Gait) 400  1 standing rest  -       Row Name 04/18/24 1309          Motor Skills    Comments, Therapeutic Exercise Aps x 15-did  not finish warm ups due to pt standing impulsively  -       Row Name             Wound 04/17/24 0809 sternal Incision    Wound - Properties Group Placement Date: 04/17/24  - Placement Time: 0809  - Present on Original Admission: N  - Location: sternal  -LH Primary Wound Type: Incision  -LH    Retired Wound - Properties Group Placement Date: 04/17/24  - Placement Time: 0809  - Present on Original Admission: N  - Location: sternal  -LH Primary Wound Type: Incision  -LH    Retired Wound - Properties Group Date first assessed: 04/17/24  - Time first assessed: 0809  - Present on Original Admission: N  - Location: sternal  -LH Primary Wound Type: Incision  -LH      Row Name 04/18/24 1309          Vital Signs    Intratreatment Heart Rate (beats/min) 101  -MF     Post SpO2 (%) 94  -MF     O2 Delivery Post Treatment supplemental O2  2l  -       Row Name 04/18/24 1309          Positioning and Restraints    Pre-Treatment Position sitting in  chair/recliner  -MF     Post Treatment Position chair  -MF     In Chair notified nsg;reclined;call light within reach;encouraged to call for assist;RUE elevated;LUE elevated  -MF               User Key  (r) = Recorded By, (t) = Taken By, (c) = Cosigned By      Initials Name Provider Type     Sofi Reid, RN Registered Nurse    Rissa Colon, DOUG Physical Therapist Assistant                    Physical Therapy Education       Title: PT OT SLP Therapies (Done)       Topic: Physical Therapy (Done)       Point: Mobility training (Done)       Learning Progress Summary             Patient Acceptance, E, VU by  at 4/18/2024 0853    Comment: safe mobility, fall risk, sternal precautions                         Point: Home exercise program (Done)       Learning Progress Summary             Patient Acceptance, E, VU by  at 4/18/2024 0853    Comment: safe mobility, fall risk, sternal precautions                         Point: Body mechanics (Done)       Learning Progress Summary             Patient Acceptance, E, VU by  at 4/18/2024 0853    Comment: safe mobility, fall risk, sternal precautions                         Point: Precautions (Done)       Learning Progress Summary             Patient Acceptance, E, VU by  at 4/18/2024 0853    Comment: safe mobility, fall risk, sternal precautions                                         User Key       Initials Effective Dates Name Provider Type Discipline     02/22/24 -  Sandeep Person, PT Student PT Student PT                  PT Recommendation and Plan             Time Calculation:    PT Charges       Row Name 04/18/24 1333 04/18/24 0801          Time Calculation    Start Time 1309  - 0801  -MARCELO (r) AJ (t) MARCELO (c)     Stop Time 1333  -MF 0841  -MARCELO (r) AJ (t) MARCELO (c)     Time Calculation (min) 24 min  -MF 40 min  -MARCELO (r) AJ (t)     PT Received On -- 04/18/24  -MARCELO (r) AJ (t) MARCELO (c)     PT Goal Re-Cert Due Date -- 04/28/24  -MARCELO (r) AJ (t) MARCELO (c)        Time  Calculation- PT    Total Timed Code Minutes- PT 24 minute(s)  -MF --        Timed Charges    77310 - Gait Training Minutes  24  -MF --        Untimed Charges    PT Eval/Re-eval Minutes -- 40  -MARCELO (r) AJ (t) MARCELO (c)        Total Minutes    Timed Charges Total Minutes 24  -MF --     Untimed Charges Total Minutes -- 40  -MARCELO (r) AJ (t)      Total Minutes 24  -MF 40  -MARCELO (r) AJ (t)               User Key  (r) = Recorded By, (t) = Taken By, (c) = Cosigned By      Initials Name Provider Type    Jake Herndon, PT DPT Physical Therapist    Rissa Colon PTA Physical Therapist Assistant    Sandeep Valdivia, PT Student PT Student                  Therapy Charges for Today       Code Description Service Date Service Provider Modifiers Qty    [id number] HC GAIT TRAINING EA 15 MIN 4/18/2024 Rissa Alva PTA GP 2            PT G-Codes  Outcome Measure Options: AM-PAC 6 Clicks Basic Mobility (PT)  AM-PAC 6 Clicks Score (PT): 18    Rissa Alva PTA  4/18/2024

## 2024-04-18 NOTE — CASE MANAGEMENT/SOCIAL WORK
Discharge Planning Assessment   [facility]     Patient Name: Nic Castro  MRN: [id number]  Today's Date: 4/18/2024    Admit Date: 4/17/2024        Discharge Needs Assessment       Row Name 04/18/24 1021       Living Environment    People in Home spouse    Name(s) of People in Home Lubna Castro    Current Living Arrangements home    Potentially Unsafe Housing Conditions none    In the past 12 months has the electric, gas, oil, or water company threatened to shut off services in your home? No    Primary Care Provided by self    Provides Primary Care For no one    Quality of Family Relationships stressful    Able to Return to Prior Arrangements yes       Resource/Environmental Concerns    Resource/Environmental Concerns financial    Transportation Concerns no car       Transition Planning    Patient/Family Anticipates Transition to inpatient rehabilitation facility;home with family    Patient/Family Anticipated Services at Transition     Transportation Anticipated family or friend will provide       Discharge Needs Assessment    Readmission Within the Last 30 Days no previous admission in last 30 days    Concerns to be Addressed care coordination/care conferences;discharge planning    Equipment Needed After Discharge none    Outpatient/Agency/Support Group Needs inpatient rehabilitation facility    Discharge Facility/Level of Care Needs acute rehab    Current Discharge Risk chronically ill    Discharge Coordination/Progress LOUISA spoke with patient to assess for dc planning needs.  Patient advised he resides at home with his spouse prior to admission.  Patient expressed concern returning home at discharge due to his spouse drinking.  Patient advised he no longer drinks and it's hard for him to be in an environment with people that do.  Patient was tangential in his speech and was discussing his mental health history and diagnoses.  Patient was asking to speak to a therapist during hospitalization.  LOUISA  informed patient this was a contract service for the patient and his physician would have to consult psychiatry if desired.  Options for surgery recovery was discussed.  Patient expressed feeling overwhelmed and not sure what he was going to do.  SW inquired if this had been discussed with physician prior to surgery and patient stated he had not discussed his concerns with anyone.  PT evaluation pending.  Will await recommendations from therapy.                   Discharge Plan    No documentation.                 Continued Care and Services - Admitted Since 4/17/2024    No active coordination exists for this encounter.          Demographic Summary    No documentation.                  Functional Status    No documentation.                  Psychosocial    No documentation.                  Abuse/Neglect    No documentation.                  Legal    No documentation.                  Substance Abuse    No documentation.                  Patient Forms    No documentation.                     CHANCE Allison

## 2024-04-18 NOTE — PLAN OF CARE
Goal Outcome Evaluation:      Levo gtt off at 0900. Albumin and calcium given this morning. Pt ambulated with PT today, multiple laps around unit without difficulty. Adequate uop. Minimal bleeding. VSS. Afebrile. Pain 6-7/10, virgilio given prn. Okay to transfer to floor per DrTracy Duran.

## 2024-04-18 NOTE — PLAN OF CARE
Goal Outcome Evaluation:  Plan of Care Reviewed With: patient           Outcome Evaluation: PT eval completed. Pt resting in bedside chair, AOx4, requiring 2.5 L/min, with pain reported over chest. Pt reports independence at his baseline and still works but knows it will take some time. Pt pre vitals , SpO2 95% on 2.5 L/min, /66, RR 15. Pt demo functional AROM and functional B LE strength. Pt demo sit to stand with Min Ax1 and 1 person for equipment, pt demo posterior lean once initially standing but corrected independently. Pt tolerated 200' walk with no complaints and stated the IV pole was slowing him down most. Pt return to bed and educated on continuing OOB activity with PT services and maintaining sternal precautions. Pt will benefit from skilled pt services to regain safe stair negotiation, activity tolerance, return to PLOF. Pt post vitals , SpO2 94, RR 24, /62. Anticipate d.c home with assist from spouse when medically stable      Anticipated Discharge Disposition (PT): home with assist

## 2024-04-19 ENCOUNTER — APPOINTMENT (OUTPATIENT)
Dept: GENERAL RADIOLOGY | Facility: HOSPITAL | Age: 49
DRG: 221 | End: 2024-04-19
Payer: MEDICAID

## 2024-04-19 LAB
ANION GAP SERPL CALCULATED.3IONS-SCNC: 7 MMOL/L (ref 5–15)
BH BB BLOOD EXPIRATION DATE: NORMAL
BH BB BLOOD EXPIRATION DATE: NORMAL
BH BB BLOOD TYPE BARCODE: 600
BH BB BLOOD TYPE BARCODE: 600
BH BB DISPENSE STATUS: NORMAL
BH BB DISPENSE STATUS: NORMAL
BH BB PRODUCT CODE: NORMAL
BH BB PRODUCT CODE: NORMAL
BH BB UNIT NUMBER: NORMAL
BH BB UNIT NUMBER: NORMAL
BUN SERPL-MCNC: 21 MG/DL (ref 6–20)
BUN/CREAT SERPL: 32.3 (ref 7–25)
CALCIUM SPEC-SCNC: 8.8 MG/DL (ref 8.6–10.5)
CHLORIDE SERPL-SCNC: 104 MMOL/L (ref 98–107)
CO2 SERPL-SCNC: 25 MMOL/L (ref 22–29)
CREAT SERPL-MCNC: 0.65 MG/DL (ref 0.76–1.27)
CROSSMATCH INTERPRETATION: NORMAL
CROSSMATCH INTERPRETATION: NORMAL
DEPRECATED RDW RBC AUTO: 49.4 FL (ref 37–54)
EGFRCR SERPLBLD CKD-EPI 2021: 114.8 ML/MIN/1.73
ERYTHROCYTE [DISTWIDTH] IN BLOOD BY AUTOMATED COUNT: 15.7 % (ref 12.3–15.4)
GLUCOSE SERPL-MCNC: 122 MG/DL (ref 65–99)
HCT VFR BLD AUTO: 26.1 % (ref 37.5–51)
HGB BLD-MCNC: 8.4 G/DL (ref 13–17.7)
INR PPP: 1.21 (ref 0.91–1.09)
MAGNESIUM SERPL-MCNC: 2.2 MG/DL (ref 1.6–2.6)
MCH RBC QN AUTO: 27.6 PG (ref 26.6–33)
MCHC RBC AUTO-ENTMCNC: 32.2 G/DL (ref 31.5–35.7)
MCV RBC AUTO: 85.9 FL (ref 79–97)
PLATELET # BLD AUTO: 109 10*3/MM3 (ref 140–450)
PMV BLD AUTO: 11.1 FL (ref 6–12)
POTASSIUM SERPL-SCNC: 4.8 MMOL/L (ref 3.5–5.2)
PROTHROMBIN TIME: 15.8 SECONDS (ref 11.8–14.8)
QT INTERVAL: 368 MS
QTC INTERVAL: 432 MS
RBC # BLD AUTO: 3.04 10*6/MM3 (ref 4.14–5.8)
SODIUM SERPL-SCNC: 136 MMOL/L (ref 136–145)
UNIT  ABO: NORMAL
UNIT  ABO: NORMAL
UNIT  RH: NORMAL
UNIT  RH: NORMAL
WBC NRBC COR # BLD AUTO: 16.58 10*3/MM3 (ref 3.4–10.8)

## 2024-04-19 PROCEDURE — 85610 PROTHROMBIN TIME: CPT | Performed by: NURSE PRACTITIONER

## 2024-04-19 PROCEDURE — 93010 ELECTROCARDIOGRAM REPORT: CPT | Performed by: INTERNAL MEDICINE

## 2024-04-19 PROCEDURE — 71045 X-RAY EXAM CHEST 1 VIEW: CPT

## 2024-04-19 PROCEDURE — 83735 ASSAY OF MAGNESIUM: CPT | Performed by: SURGERY

## 2024-04-19 PROCEDURE — 97116 GAIT TRAINING THERAPY: CPT

## 2024-04-19 PROCEDURE — 25010000002 ENOXAPARIN PER 10 MG: Performed by: SURGERY

## 2024-04-19 PROCEDURE — 25010000002 FUROSEMIDE PER 20 MG: Performed by: NURSE PRACTITIONER

## 2024-04-19 PROCEDURE — 93005 ELECTROCARDIOGRAM TRACING: CPT | Performed by: SURGERY

## 2024-04-19 PROCEDURE — 85027 COMPLETE CBC AUTOMATED: CPT | Performed by: SURGERY

## 2024-04-19 PROCEDURE — 80048 BASIC METABOLIC PNL TOTAL CA: CPT | Performed by: SURGERY

## 2024-04-19 RX ORDER — POTASSIUM CHLORIDE 750 MG/1
20 CAPSULE, EXTENDED RELEASE ORAL 2 TIMES DAILY WITH MEALS
Status: DISCONTINUED | OUTPATIENT
Start: 2024-04-19 | End: 2024-04-23 | Stop reason: HOSPADM

## 2024-04-19 RX ORDER — FUROSEMIDE 10 MG/ML
20 INJECTION INTRAMUSCULAR; INTRAVENOUS
Status: DISCONTINUED | OUTPATIENT
Start: 2024-04-19 | End: 2024-04-23 | Stop reason: HOSPADM

## 2024-04-19 RX ORDER — BISACODYL 10 MG
10 SUPPOSITORY, RECTAL RECTAL ONCE
Status: COMPLETED | OUTPATIENT
Start: 2024-04-19 | End: 2024-04-19

## 2024-04-19 RX ORDER — WARFARIN SODIUM 5 MG/1
5 TABLET ORAL
Status: DISCONTINUED | OUTPATIENT
Start: 2024-04-19 | End: 2024-04-20

## 2024-04-19 RX ADMIN — POLYETHYLENE GLYCOL 3350 17 G: 17 POWDER, FOR SOLUTION ORAL at 08:30

## 2024-04-19 RX ADMIN — ATORVASTATIN CALCIUM 20 MG: 10 TABLET, FILM COATED ORAL at 22:04

## 2024-04-19 RX ADMIN — OXYCODONE HYDROCHLORIDE 5 MG: 5 TABLET ORAL at 17:40

## 2024-04-19 RX ADMIN — OXYCODONE HYDROCHLORIDE 10 MG: 10 TABLET ORAL at 10:20

## 2024-04-19 RX ADMIN — PANTOPRAZOLE SODIUM 40 MG: 40 TABLET, DELAYED RELEASE ORAL at 05:43

## 2024-04-19 RX ADMIN — CLONAZEPAM 0.5 MG: 0.5 TABLET ORAL at 22:05

## 2024-04-19 RX ADMIN — WARFARIN SODIUM 5 MG: 5 TABLET ORAL at 17:37

## 2024-04-19 RX ADMIN — METHOCARBAMOL 500 MG: 500 TABLET, FILM COATED ORAL at 22:04

## 2024-04-19 RX ADMIN — OXYCODONE HYDROCHLORIDE 5 MG: 5 TABLET ORAL at 04:53

## 2024-04-19 RX ADMIN — FUROSEMIDE 20 MG: 10 INJECTION, SOLUTION INTRAMUSCULAR; INTRAVENOUS at 10:20

## 2024-04-19 RX ADMIN — ACETAMINOPHEN 650 MG: 325 TABLET, FILM COATED ORAL at 23:46

## 2024-04-19 RX ADMIN — FUROSEMIDE 20 MG: 10 INJECTION, SOLUTION INTRAMUSCULAR; INTRAVENOUS at 17:37

## 2024-04-19 RX ADMIN — POTASSIUM CHLORIDE 20 MEQ: 750 CAPSULE, EXTENDED RELEASE ORAL at 10:20

## 2024-04-19 RX ADMIN — METOPROLOL TARTRATE 12.5 MG: 25 TABLET, FILM COATED ORAL at 22:04

## 2024-04-19 RX ADMIN — POTASSIUM CHLORIDE 20 MEQ: 750 CAPSULE, EXTENDED RELEASE ORAL at 17:37

## 2024-04-19 RX ADMIN — METHOCARBAMOL 500 MG: 500 TABLET, FILM COATED ORAL at 14:37

## 2024-04-19 RX ADMIN — ACETAMINOPHEN 650 MG: 325 TABLET, FILM COATED ORAL at 05:43

## 2024-04-19 RX ADMIN — METHOCARBAMOL 500 MG: 500 TABLET, FILM COATED ORAL at 05:43

## 2024-04-19 RX ADMIN — QUETIAPINE FUMARATE 100 MG: 100 TABLET ORAL at 22:04

## 2024-04-19 RX ADMIN — METOPROLOL TARTRATE 12.5 MG: 25 TABLET, FILM COATED ORAL at 08:30

## 2024-04-19 RX ADMIN — CLONAZEPAM 0.5 MG: 0.5 TABLET ORAL at 08:30

## 2024-04-19 RX ADMIN — Medication 1 APPLICATION: at 05:43

## 2024-04-19 RX ADMIN — BISACODYL 10 MG: 10 SUPPOSITORY RECTAL at 10:20

## 2024-04-19 RX ADMIN — CHLORHEXIDINE GLUCONATE 0.12% ORAL RINSE 15 ML: 1.2 LIQUID ORAL at 17:37

## 2024-04-19 RX ADMIN — BISACODYL 10 MG: 5 TABLET, COATED ORAL at 22:05

## 2024-04-19 RX ADMIN — ASPIRIN 81 MG: 81 TABLET, COATED ORAL at 08:30

## 2024-04-19 RX ADMIN — ACETAMINOPHEN 650 MG: 325 TABLET, FILM COATED ORAL at 11:57

## 2024-04-19 RX ADMIN — OXYCODONE HYDROCHLORIDE 10 MG: 10 TABLET ORAL at 22:04

## 2024-04-19 RX ADMIN — ARIPIPRAZOLE 5 MG: 5 TABLET ORAL at 08:30

## 2024-04-19 RX ADMIN — BISACODYL 10 MG: 5 TABLET, COATED ORAL at 08:30

## 2024-04-19 RX ADMIN — ENOXAPARIN SODIUM 40 MG: 100 INJECTION SUBCUTANEOUS at 08:30

## 2024-04-19 RX ADMIN — ACETAMINOPHEN 650 MG: 325 TABLET, FILM COATED ORAL at 17:37

## 2024-04-19 RX ADMIN — CHLORHEXIDINE GLUCONATE 0.12% ORAL RINSE 15 ML: 1.2 LIQUID ORAL at 05:44

## 2024-04-19 RX ADMIN — SERTRALINE HYDROCHLORIDE 200 MG: 100 TABLET, FILM COATED ORAL at 08:30

## 2024-04-19 NOTE — PROGRESS NOTES
"Patient name: Nic Castro  Patient : 1975  VISIT # [id number]  MR #[id number]    Procedure:Procedure(s):  AORTIC ROOT REPLACEMENT, ASCENDING AORTIC HEMIARCH REPLACEMENT, AORTIC VALVE REPLACEMENT WITH MECHANICAL VALVE WITH CIRCULATORY ARREST, TRANSESOPHAGEAL ECHOCARDIOGRAM  Procedure Date:2024  POD:2 Days Post-Op    Subjective     Transferred to  yesterday afternoon.  He is on 2 L nasal cannula.  Weight is up 5 pounds and he is 11 pounds above his baseline.  INR today is 1.2.  No bowel movement.  Walking 200 feet with physical therapy.    Telemetry: Sinus rhythm 84-94  IV drips: None       Objective     Visit Vitals  /67 (BP Location: Left arm, Patient Position: Sitting)   Pulse 83   Temp 98.2 °F (36.8 °C) (Oral)   Resp 16   Ht 180.3 cm (71\")   Wt 95.3 kg (210 lb 3.2 oz)   SpO2 95%   BMI 29.32 kg/m²   Baseline weight 199 pounds    Intake/Output Summary (Last 24 hours) at 2024 0908  Last data filed at 2024 0845  Gross per 24 hour   Intake 500 ml   Output 2635 ml   Net -2135 ml     MCT: 320 mL in 24 hours, serosanguineous, no airleak    Lab:     CBC:  Results from last 7 days   Lab Units 24  0303 04/18/24  0248 24  1749   WBC 10*3/mm3 16.58* 15.20* 11.19*   HEMATOCRIT % 26.1* 29.5* 38.5   PLATELETS 10*3/mm3 109* 132* 154          BMP:  Results from last 7 days   Lab Units 24  0303 04/18/24  0248 24  1749   SODIUM mmol/L 136 138 141   POTASSIUM mmol/L 4.8 4.7 4.4   CHLORIDE mmol/L 104 105 107   CO2 mmol/L 25.0 24.0 26.0   GLUCOSE mg/dL 122* 121* 189*   BUN mg/dL 21* 15 11   CREATININE mg/dL 0.65* 0.74* 0.71*          COAG:  Results from last 7 days   Lab Units 24  0303 24  1336 24  1228   INR  1.21*   < > 1.50*   APTT seconds  --   --  42.1*    < > = values in this interval not displayed.       IMAGES:       Imaging Results (Last 24 Hours)       Procedure Component Value Units Date/Time    XR Chest 1 View [[id number]] Collected: 24 0703 "     Updated: 04/19/24 0708    Narrative:      XR CHEST 1 VW- 4/19/2024 2:32 AM     HISTORY: Post-Op Heart Surgery       COMPARISON: Chest x-ray dated 1424     FINDINGS:  Upright frontal radiograph of the chest was obtained     Postop aortic root, valve and hemiarch replacement. There is mild  bibasilar atelectasis. No other consolidation. No pleural effusion or  pneumothorax. Surgical drains remain in place. Heart size is stable.  Discontinued vascular sheath.       Impression:      1.  Discontinued vascular sheath. Otherwise stable.  2.  Mild bibasilar atelectasis.     This report was signed and finalized on 4/19/2024 7:05 AM by Dr Balaji Chavez.             CXR: Mediastinal chest tubes in stable position with no pneumothorax.  Bibasilar atelectasis.    Physical Exam:  General: Alert, oriented. No apparent distress.   Cardiovascular: Regular rate and rhythm without murmur, rubs, or gallops.    Pulmonary: Clear to auscultation bilaterally without wheezing, rubs, or rales.  Chest: Sternotomy incision clean, dry, and intact. Sternum stable. No clicks.  Mediastinal chest tubes to 20 cm suction. No air leak. Fluid is serosanguineous.   Abdomen: Soft, nondistended, and nontender.  Extremities: Warm, moves all extremities.  Mild lower extremity edema.  Neurologic:  Grossly intact with no focal deficits.            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  Daily PT/INR.  Goal INR is 2-3  Diurese  Wean supplemental O2 as tolerated  Multimodality pain control regimen  Encourage pulmonary toilet and ambulation  Routine postcardiac surgery care  Discussed with patient and nursing  Keep chest tubes 1 more day as output is too high for removal today.  Plan on repeat CTA chest prior to discharge        Mehnaz Pizano,  ISABEL  04/19/24  09:08 CDT

## 2024-04-19 NOTE — PLAN OF CARE
Goal Outcome Evaluation:  Plan of Care Reviewed With: patient        Progress: improving  Outcome Evaluation: Pt transfer from ICU. Patient needs reinforcement with sternal precautions. Patient states that he is know to sleep walk at home and did attempt to get out of bed be self a couple of time. Bed/chair alarm placed for safety and patient reeducated on sternal precautions and risks and acknowledged understanding. Tele [id number]. Patient remains on 2LPM oxygen via nasal cannula. Mediastinal tube in place, output charted. Patient given PRN pain medication with relief.

## 2024-04-19 NOTE — PLAN OF CARE
Goal Outcome Evaluation:  Plan of Care Reviewed With: patient        Progress: improving  Outcome Evaluation: Pt up in chair c/o increased pain 8/10 notified nsg. sit-stand supervision. Pt cont to need cues for reminders of cardiac protocol and impulsiveness. Pt was able to amb entire hallway with only 1 standing rest. once amb back to room pt increased candence and c/o SOA o2 remained in 90s on RA during gait.

## 2024-04-19 NOTE — THERAPY TREATMENT NOTE
Acute Care - Physical Therapy Treatment Note  [facility]     Patient Name: Nic Castro  : 1975  MRN: [id number]  Today's Date: 2024      Visit Dx:     ICD-10-CM ICD-9-CM   1. Impaired mobility [Z74.09]  Z74.09 799.89   2. Aneurysm of the ascending aorta, without rupture  I71.21 441.2     Patient Active Problem List   Diagnosis    Aneurysm of the ascending aorta, without rupture    Chest pain    Dyslipidemia    Tobacco use    Overweight with body mass index (BMI) of 28 to 28.9 in adult    Aortic root aneurysm     Past Medical History:   Diagnosis Date    AAA (abdominal aortic aneurysm)     Anxiety     Arthritis     GERD (gastroesophageal reflux disease)     Hepatitis C     Hyperlipidemia     Hypertension     Pancreatitis     Seizure     from head injury    Substance abuse      Past Surgical History:   Procedure Laterality Date    ANKLE LIGAMENT RECONSTRUCTION Left 2015    ASCENDING ARCH/HEMIARCH REPLACEMENT N/A 2024    Procedure: AORTIC ROOT REPLACEMENT, ASCENDING AORTIC HEMIARCH REPLACEMENT, AORTIC VALVE REPLACEMENT WITH MECHANICAL VALVE WITH CIRCULATORY ARREST, TRANSESOPHAGEAL ECHOCARDIOGRAM;  Surgeon: Sandeep Duran MD;  Location: [facility] OR;  Service: Cardiothoracic;  Laterality: N/A;    CARDIAC CATHETERIZATION N/A 2023    Procedure: Left Heart Cath;  Surgeon: Nikolas Ramos MD;  Location:  [facility]ATH [facility];  Service: Cardiology;  Laterality: N/A;    TOOTH EXTRACTION      Full mouth extraction     PT Assessment (Last 12 Hours)       PT Evaluation and Treatment       Row Name 24 1003          Physical Therapy Time and Intention    Subjective Information complains of;pain  -NW     Document Type therapy note (daily note)  -NW     Mode of Treatment physical therapy  -NW       Row Name 24 1003          General Information    Existing Precautions/Restrictions fall;sternal  chest tube  -NW       Row Name 24 1003          Pain    Pretreatment Pain Rating 7/10   -NW     Posttreatment Pain Rating 8/10  -NW     Pain Location incisional  -NW     Pain Location - chest  -NW       Row Name 04/19/24 1003          Bed Mobility    Comment, (Bed Mobility) chair  -NW       Row Name 04/19/24 1003          Sit-Stand Transfer    Sit-Stand [place] (Transfers) standby assist  -NW       Row Name 04/19/24 1003          Stand-Sit Transfer    Stand-Sit [place] (Transfers) standby assist  -NW       Row Name 04/19/24 1003          Gait/Stairs (Locomotion)    [place] Level (Gait) contact guard  -NW     Distance in Feet (Gait) 600  standing rest x1  -NW     Comment, (Gait/Stairs) cues for safety pt w/ increased gait amb back to room  -NW       Row Name 04/19/24 1003          Safety Issues, Functional Mobility    Safety Issues Affecting Function (Mobility) impulsivity  -NW     Impairments Affecting Function (Mobility) endurance/activity tolerance;pain  -NW       Row Name 04/19/24 1003          Motor Skills    Comments, Therapeutic Exercise cardiac protocol  -NW       Row Name             Wound 04/17/24 0809 sternal Incision    Wound - Properties Group Placement Date: 04/17/24  - Placement Time: 0809  - Present on Original Admission: N  -LH Location: sternal  -LH Primary Wound Type: Incision  -LH    Retired Wound - Properties Group Placement Date: 04/17/24  - Placement Time: 0809  - Present on Original Admission: N  - Location: sternal  -LH Primary Wound Type: Incision  -LH    Retired Wound - Properties Group Date first assessed: 04/17/24  - Time first assessed: 0809  - Present on Original Admission: N  - Location: sternal  -LH Primary Wound Type: Incision  -LH      Row Name 04/19/24 1003          Vital Signs    Pre SpO2 (%) 94  -NW     O2 Delivery Pre Treatment nasal cannula  -NW     O2 Delivery Intra Treatment room air  -NW     Post SpO2 (%) 91  -NW     O2 Delivery Post Treatment room air  -NW       Row Name 04/19/24 1003          Positioning and Restraints     Pre-Treatment Position sitting in chair/recliner  -NW     Post Treatment Position chair  -NW     In Chair reclined;call light within reach;encouraged to call for assist  -NW               User Key  (r) = Recorded By, (t) = Taken By, (c) = Cosigned By      Initials Name Provider Type    NW Rosibel Dewitt, PTA Physical Therapist Assistant    Sofi Serrano, RN Registered Nurse                    Physical Therapy Education       Title: PT OT SLP Therapies (Done)       Topic: Physical Therapy (Done)       Point: Mobility training (Done)       Learning Progress Summary             Patient Acceptance, E, VU by  at 4/18/2024 0853    Comment: safe mobility, fall risk, sternal precautions                         Point: Home exercise program (Done)       Learning Progress Summary             Patient Acceptance, E, VU by  at 4/18/2024 0853    Comment: safe mobility, fall risk, sternal precautions                         Point: Body mechanics (Done)       Learning Progress Summary             Patient Acceptance, E, VU by  at 4/18/2024 0853    Comment: safe mobility, fall risk, sternal precautions                         Point: Precautions (Done)       Learning Progress Summary             Patient Acceptance, E, VU by  at 4/18/2024 0853    Comment: safe mobility, fall risk, sternal precautions                                         User Key       Initials Effective Dates Name Provider Type Discipline     02/22/24 -  Sandeep Person, PT Student PT Student PT                  PT Recommendation and Plan     Plan of Care Reviewed With: patient  Progress: improving  Outcome Evaluation: Pt up in chair c/o increased pain 8/10 notified nsg. sit-stand supervision. Pt cont to need cues for reminders of cardiac protocol and impulsiveness. Pt was able to amb entire hallway with only 1 standing rest. once amb back to room pt increased candence and c/o SOA o2 remained in 90s on RA during gait.       Time Calculation:    PT  Charges       Row Name 04/19/24 1038             Time Calculation    Start Time 1004  -NW      Stop Time 1020  -NW      Time Calculation (min) 16 min  -NW      PT Received On 04/19/24  -NW      PT Goal Re-Cert Due Date 04/28/24  -NW         Time Calculation- PT    Total Timed Code Minutes- PT 16 minute(s)  -NW         Timed Charges    67017 - Gait Training Minutes  16  -NW         Total Minutes    Timed Charges Total Minutes 16  -NW       Total Minutes 16  -NW                User Key  (r) = Recorded By, (t) = Taken By, (c) = Cosigned By      Initials Name Provider Type    NW Rosibel Dewitt PTA Physical Therapist Assistant                  Therapy Charges for Today       Code Description Service Date Service Provider Modifiers Qty    52879665291 HC GAIT TRAINING EA 15 MIN 4/19/2024 Rosibel Dewitt PTA GP 1            PT G-Codes  Outcome Measure Options: AM-PAC 6 Clicks Basic Mobility (PT)  AM-PAC 6 Clicks Score (PT): 18    Rosibel Dewitt PTA  4/19/2024

## 2024-04-20 ENCOUNTER — APPOINTMENT (OUTPATIENT)
Dept: GENERAL RADIOLOGY | Facility: HOSPITAL | Age: 49
DRG: 221 | End: 2024-04-20
Payer: MEDICAID

## 2024-04-20 LAB
ANION GAP SERPL CALCULATED.3IONS-SCNC: 7 MMOL/L (ref 5–15)
BUN SERPL-MCNC: 17 MG/DL (ref 6–20)
BUN/CREAT SERPL: 29.8 (ref 7–25)
CALCIUM SPEC-SCNC: 8.9 MG/DL (ref 8.6–10.5)
CHLORIDE SERPL-SCNC: 103 MMOL/L (ref 98–107)
CO2 SERPL-SCNC: 28 MMOL/L (ref 22–29)
CREAT SERPL-MCNC: 0.57 MG/DL (ref 0.76–1.27)
DEPRECATED RDW RBC AUTO: 47.8 FL (ref 37–54)
EGFRCR SERPLBLD CKD-EPI 2021: 119.4 ML/MIN/1.73
ERYTHROCYTE [DISTWIDTH] IN BLOOD BY AUTOMATED COUNT: 15.3 % (ref 12.3–15.4)
GLUCOSE SERPL-MCNC: 106 MG/DL (ref 65–99)
HCT VFR BLD AUTO: 26.5 % (ref 37.5–51)
HGB BLD-MCNC: 8.4 G/DL (ref 13–17.7)
INR PPP: 2.04 (ref 0.91–1.09)
MCH RBC QN AUTO: 27.2 PG (ref 26.6–33)
MCHC RBC AUTO-ENTMCNC: 31.7 G/DL (ref 31.5–35.7)
MCV RBC AUTO: 85.8 FL (ref 79–97)
PLATELET # BLD AUTO: 116 10*3/MM3 (ref 140–450)
PMV BLD AUTO: 10.9 FL (ref 6–12)
POTASSIUM SERPL-SCNC: 4 MMOL/L (ref 3.5–5.2)
PROTHROMBIN TIME: 23.9 SECONDS (ref 11.8–14.8)
QT INTERVAL: 356 MS
QTC INTERVAL: 442 MS
RBC # BLD AUTO: 3.09 10*6/MM3 (ref 4.14–5.8)
SODIUM SERPL-SCNC: 138 MMOL/L (ref 136–145)
WBC NRBC COR # BLD AUTO: 14.81 10*3/MM3 (ref 3.4–10.8)

## 2024-04-20 PROCEDURE — 85027 COMPLETE CBC AUTOMATED: CPT | Performed by: SURGERY

## 2024-04-20 PROCEDURE — 93005 ELECTROCARDIOGRAM TRACING: CPT | Performed by: SURGERY

## 2024-04-20 PROCEDURE — 25010000002 FUROSEMIDE PER 20 MG: Performed by: NURSE PRACTITIONER

## 2024-04-20 PROCEDURE — 80048 BASIC METABOLIC PNL TOTAL CA: CPT | Performed by: SURGERY

## 2024-04-20 PROCEDURE — 97116 GAIT TRAINING THERAPY: CPT

## 2024-04-20 PROCEDURE — 71046 X-RAY EXAM CHEST 2 VIEWS: CPT

## 2024-04-20 PROCEDURE — 93010 ELECTROCARDIOGRAM REPORT: CPT | Performed by: EMERGENCY MEDICINE

## 2024-04-20 PROCEDURE — 85610 PROTHROMBIN TIME: CPT | Performed by: NURSE PRACTITIONER

## 2024-04-20 RX ORDER — LIDOCAINE 4 G/G
2 PATCH TOPICAL
Status: DISCONTINUED | OUTPATIENT
Start: 2024-04-20 | End: 2024-04-23 | Stop reason: HOSPADM

## 2024-04-20 RX ORDER — WARFARIN SODIUM 2 MG/1
2 TABLET ORAL
Status: DISCONTINUED | OUTPATIENT
Start: 2024-04-20 | End: 2024-04-21

## 2024-04-20 RX ORDER — PREGABALIN 25 MG/1
25 CAPSULE ORAL EVERY 12 HOURS SCHEDULED
Status: DISCONTINUED | OUTPATIENT
Start: 2024-04-20 | End: 2024-04-23

## 2024-04-20 RX ADMIN — FUROSEMIDE 20 MG: 10 INJECTION, SOLUTION INTRAMUSCULAR; INTRAVENOUS at 18:17

## 2024-04-20 RX ADMIN — OXYCODONE HYDROCHLORIDE 10 MG: 10 TABLET ORAL at 21:45

## 2024-04-20 RX ADMIN — PREGABALIN 25 MG: 25 CAPSULE ORAL at 10:51

## 2024-04-20 RX ADMIN — METHOCARBAMOL 500 MG: 500 TABLET, FILM COATED ORAL at 05:43

## 2024-04-20 RX ADMIN — LIDOCAINE 2 PATCH: 4 PATCH TOPICAL at 10:51

## 2024-04-20 RX ADMIN — BISACODYL 10 MG: 5 TABLET, COATED ORAL at 21:49

## 2024-04-20 RX ADMIN — METHOCARBAMOL 500 MG: 500 TABLET, FILM COATED ORAL at 21:45

## 2024-04-20 RX ADMIN — METHOCARBAMOL 500 MG: 500 TABLET, FILM COATED ORAL at 15:15

## 2024-04-20 RX ADMIN — METOPROLOL TARTRATE 12.5 MG: 25 TABLET, FILM COATED ORAL at 09:12

## 2024-04-20 RX ADMIN — METOPROLOL TARTRATE 12.5 MG: 25 TABLET, FILM COATED ORAL at 21:44

## 2024-04-20 RX ADMIN — ASPIRIN 81 MG: 81 TABLET, COATED ORAL at 09:16

## 2024-04-20 RX ADMIN — QUETIAPINE FUMARATE 100 MG: 100 TABLET ORAL at 21:44

## 2024-04-20 RX ADMIN — PANTOPRAZOLE SODIUM 40 MG: 40 TABLET, DELAYED RELEASE ORAL at 05:43

## 2024-04-20 RX ADMIN — ARIPIPRAZOLE 5 MG: 5 TABLET ORAL at 09:12

## 2024-04-20 RX ADMIN — PREGABALIN 25 MG: 25 CAPSULE ORAL at 21:44

## 2024-04-20 RX ADMIN — ACETAMINOPHEN 650 MG: 325 TABLET, FILM COATED ORAL at 05:44

## 2024-04-20 RX ADMIN — WARFARIN SODIUM 2 MG: 2 TABLET ORAL at 18:17

## 2024-04-20 RX ADMIN — ACETAMINOPHEN 650 MG: 325 TABLET, FILM COATED ORAL at 18:17

## 2024-04-20 RX ADMIN — OXYCODONE HYDROCHLORIDE 10 MG: 10 TABLET ORAL at 04:42

## 2024-04-20 RX ADMIN — POTASSIUM CHLORIDE 20 MEQ: 750 CAPSULE, EXTENDED RELEASE ORAL at 09:16

## 2024-04-20 RX ADMIN — SERTRALINE HYDROCHLORIDE 200 MG: 100 TABLET, FILM COATED ORAL at 09:12

## 2024-04-20 RX ADMIN — ATORVASTATIN CALCIUM 20 MG: 10 TABLET, FILM COATED ORAL at 21:44

## 2024-04-20 RX ADMIN — OXYCODONE HYDROCHLORIDE 10 MG: 10 TABLET ORAL at 10:44

## 2024-04-20 RX ADMIN — CLONAZEPAM 0.5 MG: 0.5 TABLET ORAL at 09:16

## 2024-04-20 RX ADMIN — POTASSIUM CHLORIDE 20 MEQ: 750 CAPSULE, EXTENDED RELEASE ORAL at 18:17

## 2024-04-20 RX ADMIN — CLONAZEPAM 0.5 MG: 0.5 TABLET ORAL at 21:44

## 2024-04-20 RX ADMIN — FUROSEMIDE 20 MG: 10 INJECTION, SOLUTION INTRAMUSCULAR; INTRAVENOUS at 09:16

## 2024-04-20 NOTE — PAYOR COMM NOTE
"  4/19 CLINICAL  [id number]    [id number]  Raj Castro (48 y.o. Male)       Date of Birth   1975    Social Security Number       [address] [facility] [address]    Home Phone   [phone]    N   [id number]       [employer]   Other    Marital Status                               Admission Date   4/17/24    Admission Type   Elective    Admitting Provider   Sandeep Duran MD    Attending Provider   Sandeep Duran MD    Department, Room/Bed   [facility] 4B, 435/1       Discharge Date       Discharge Disposition       Discharge Destination                                 Attending Provider: Sandeep Duran MD    Allergies: No Known Allergies    Isolation: None   Infection: None   Code Status: CPR    Ht: 180.3 cm (71\")   Wt: 92.7 kg (204 lb 6.4 oz)    Admission Cmt: None   Principal Problem: Aneurysm of the ascending aorta, without rupture [I71.21]                   Active Insurance as of 4/17/2024       Primary Coverage       Payor Plan Insurance Group Employer/Plan Group    [place] [id number]       Payor Plan Address Payor Plan Phone Number Payor Plan Fax Number Effective Dates    [employer] [address] [address] [phone]  3/1/2024 - None Entered    [facility] [id number]         Subscriber Name Subscriber Birth Date Member ID       RAJ CASTRO 1975 [id number]                     Emergency Contacts        (Rel.) Home Phone Work Phone Mobile Phone    Lubna Castro (Spouse) [phone] -- [phone]              Current Facility-Administered Medications   Medication Dose Route Frequency Provider Last Rate Last Admin    acetaminophen (TYLENOL) tablet 650 mg  650 mg Oral Q6H Sandeep Duran MD   650 mg at 04/20/24 0544    ARIPiprazole (ABILIFY) tablet 5 mg  5 mg Oral Daily Sandeep Duran MD   5 mg at 04/19/24 0830    aspirin EC tablet 81 mg  81 mg Oral Daily Sandeep Duran MD   81 mg at 04/19/24 0830    " atorvastatin (LIPITOR) tablet 20 mg  20 mg Oral Nightly Duran, Sandeep HECTOR MD   20 mg at 04/19/24 2204    bisacodyl (DULCOLAX) EC tablet 10 mg  10 mg Oral BID Duran, Sandeep HECTOR MD   10 mg at 04/19/24 2205    Calcium Replacement - Follow Nurse / BPA Driven Protocol   Does not apply PRN Sandeep Duran MD        clonazePAM (KlonoPIN) tablet 0.5 mg  0.5 mg Oral Q12H Duarn, Sandeep HECTOR MD   0.5 mg at 04/19/24 2205    dextrose 5 % with KCl 20 mEq/L infusion  30 mL/hr Intravenous Continuous Duran, Sandeep HECTOR MD 30 mL/hr at 04/17/24 1423 30 mL/hr at 04/17/24 1423    And    dextrose 5 % with KCl 20 mEq/L infusion  30 mL/hr Intravenous Continuous Duran, Sandeep HECTOR MD 30 mL/hr at 04/17/24 1354 30 mL/hr at 04/17/24 1354    [Held by provider] Enoxaparin Sodium (LOVENOX) syringe 40 mg  40 mg Subcutaneous Daily Duran, aSndeep HECTOR MD   40 mg at 04/19/24 0830    furosemide (LASIX) injection 20 mg  20 mg Intravenous BID Mehnaz Pizano APRN   20 mg at 04/19/24 1737    Magnesium Cardiology Dose Replacement - Follow Nurse / BPA Driven Protocol   Does not apply PRN Roger, Sandeep HECTOR MD        methocarbamol (ROBAXIN) tablet 500 mg  500 mg Oral Q8H Mehnaz Pizano APRN   500 mg at 04/20/24 0543    metoprolol tartrate (LOPRESSOR) tablet 12.5 mg  12.5 mg Oral Q12H Duran, Sandeep HECTOR MD   12.5 mg at 04/19/24 2204    nitroglycerin (NITROSTAT) SL tablet 0.4 mg  0.4 mg Sublingual Q5 Min PRN Roger, Sandeep HECTOR MD        ondansetron (ZOFRAN) injection 4 mg  4 mg Intravenous Q6H PRN Roger, Sandeep HECTOR MD        oxyCODONE (ROXICODONE) immediate release tablet 10 mg  10 mg Oral Q4H PRN Roger, Sandeep HECTOR MD   10 mg at 04/20/24 0442    oxyCODONE (ROXICODONE) immediate release tablet 5 mg  5 mg Oral Q4H PRN Roger, Sandeep HECTOR MD   5 mg at 04/19/24 1740    pantoprazole (PROTONIX) EC tablet 40 mg  40 mg Oral Q AM Mehnaz Pizano APRN   40 mg at 04/20/24 0543    Phosphorus Replacement - Follow Nurse / BPA Driven Protocol   Does not apply PRN Sandeep Duran MD         polyethylene glycol (MIRALAX) packet 17 g  17 g Oral Daily Sandeep Duran MD   17 g at 04/19/24 0830    potassium chloride (MICRO-K/KLOR-CON) CR capsule  20 mEq Oral BID With Meals Mehnaz Pizano APRN   20 mEq at 04/19/24 1737    Potassium Replacement - Follow Nurse / BPA Driven Protocol   Does not apply PRN Sandeep Duran MD        QUEtiapine (SEROquel) tablet 100 mg  100 mg Oral Nightly Sandeep Duran MD   100 mg at 04/19/24 2204    sertraline (ZOLOFT) tablet 200 mg  200 mg Oral Daily Sandeep Duran MD   200 mg at 04/19/24 0830    warfarin (COUMADIN) tablet 5 mg  5 mg Oral Daily Mehnaz Pizano APRN   5 mg at 04/19/24 1737     Orders (last 24 hrs)        Start     Ordered    04/20/24 0600  XR Chest PA & Lateral  1 Time Imaging         04/17/24 1335    04/20/24 0451  ECG 12 Lead Dyspnea  STAT         04/20/24 0450    04/19/24 1800  warfarin (COUMADIN) tablet 5 mg  Daily Warfarin         04/19/24 0734    04/19/24 1200  Remove All Dressings 48 Hours Post-Op  Once         04/17/24 1335    04/19/24 1200  Leave Incisions Open to Air Unless Draining or Edges Are Not Approximated  Continuous         04/17/24 1335    04/19/24 1000  furosemide (LASIX) injection 20 mg  2 Times Daily (Diuretics)         04/19/24 0908    04/19/24 1000  potassium chloride (MICRO-K/KLOR-CON) CR capsule  2 Times Daily With Meals         04/19/24 0908    04/19/24 1000  bisacodyl (DULCOLAX) suppository 10 mg  Once         04/19/24 0909    04/19/24 0900  aspirin EC tablet 81 mg  Daily         04/17/24 1335    04/19/24 0600  Wound Care  Daily       04/17/24 1335    04/19/24 0600  CBC (No Diff)  Daily       04/17/24 1335    04/19/24 0600  Basic Metabolic Panel  Daily       04/17/24 1335    04/19/24 0600  Protime-INR  Daily       04/18/24 0822    04/18/24 2100  atorvastatin (LIPITOR) tablet 20 mg  Nightly         04/17/24 1335    04/18/24 2100  QUEtiapine (SEROquel) tablet 100 mg  Nightly         04/18/24 1505    04/18/24 1400  Intake & Output  " Every Shift       04/17/24 1335    04/18/24 1200  Vital Signs  Every 4 Hours      Comments: Perform Vitals Less Frequently Only if Patient Stable      04/17/24 1335    04/18/24 0900  ARIPiprazole (ABILIFY) tablet 5 mg  Daily         04/17/24 1324    04/18/24 0900  clonazePAM (KlonoPIN) tablet 0.5 mg  Every 12 Hours Scheduled         04/17/24 1324    04/18/24 0900  sertraline (ZOLOFT) tablet 200 mg  Daily         04/17/24 1324    04/18/24 0900  metoprolol tartrate (LOPRESSOR) tablet 12.5 mg  Every 12 Hours Scheduled         04/17/24 1335    04/18/24 0900  bisacodyl (DULCOLAX) EC tablet 10 mg  2 Times Daily         04/17/24 1335    04/18/24 0900  polyethylene glycol (MIRALAX) packet 17 g  Daily         04/17/24 1335    04/18/24 0900  [Held by provider]  Enoxaparin Sodium (LOVENOX) syringe 40 mg  Daily        (On hold since today at 0746 until manually unheld; held by Liliana Aguilar APRNHold Reason: Abnormal Labs)    04/17/24 1335    04/18/24 0745  methocarbamol (ROBAXIN) tablet 500 mg  Every 8 Hours Scheduled         04/18/24 0657    04/18/24 0600  XR Chest 1 View  Daily       04/17/24 1335    04/18/24 0600  pantoprazole (PROTONIX) EC tablet 40 mg  Every Early Morning         04/17/24 1339    04/18/24 0600  acetaminophen (TYLENOL) tablet 650 mg  Every 6 Hours Scheduled         04/17/24 1343    04/17/24 1800  chlorhexidine (PERIDEX) 0.12 % solution 15 mL  Every 12 Hours,   Status:  Discontinued         04/17/24 1335    04/17/24 1600  Check Peripheral Pulses Every 4 Hours  Every 4 Hours       04/17/24 1335    04/17/24 1400  Intake & Output Every 15 Minutes x2, Every 30 Minutes x4  Every Hour       04/17/24 1335    04/17/24 1336  Daily Weights  Daily       04/17/24 1335    04/17/24 1336  Incentive Spirometry  Every Hour While Awake       04/17/24 1335    04/17/24 1335  dextrose 5 % with KCl 20 mEq/L infusion  Continuous        Placed in \"And\" Linked Group    04/17/24 1335    04/17/24 1335  dextrose 5 % with KCl 20 " "mEq/L infusion  Continuous        Placed in \"And\" Linked Group    04/17/24 1335    04/17/24 1335  Potassium Replacement - Follow Nurse / BPA Driven Protocol  As Needed         04/17/24 1335    04/17/24 1335  Magnesium Cardiology Dose Replacement - Follow Nurse / BPA Driven Protocol  As Needed         04/17/24 1335    04/17/24 1335  Phosphorus Replacement - Follow Nurse / BPA Driven Protocol  As Needed         04/17/24 1335    04/17/24 1335  Calcium Replacement - Follow Nurse / BPA Driven Protocol  As Needed         04/17/24 1335    04/17/24 1335  ondansetron (ZOFRAN) injection 4 mg  Every 6 Hours PRN         04/17/24 1335    04/17/24 1335  oxyCODONE (ROXICODONE) immediate release tablet 5 mg  Every 4 Hours PRN         04/17/24 1335    04/17/24 1335  oxyCODONE (ROXICODONE) immediate release tablet 10 mg  Every 4 Hours PRN         04/17/24 1335    04/17/24 1335  nitroglycerin (NITROSTAT) SL tablet 0.4 mg  Every 5 Minutes PRN         04/17/24 1335    04/17/24 1320  Urinary Catheter Care  Every Shift      Placed in \"And\" Linked Group    04/17/24 1324    Unscheduled  Check Peripheral Pulses As Needed  As Needed       04/17/24 1335    Unscheduled  Insert Nasogastric Tube If Indicated & Not Already in Place  As Needed      Comments: Indications: Nausea, Vomiting, Prolonged Intubation or to Administer Medications  Attach to Low Wall Suction if Any Residual    04/17/24 1335    Unscheduled  Wound Care  As Needed       04/17/24 1335    Unscheduled  Dangle at Bedside After Extubation  As Needed       04/17/24 1335    Unscheduled  Oxygen Therapy- Nasal Cannula; 2 LPM  Continuous PRN       04/17/24 1335    Unscheduled  Blood Gas, Arterial -  As Needed      Comments: Obtain ABG as needed for ventilator changes      04/17/24 1335    Unscheduled  Blood Gas, Arterial -  As Needed      Comments: Prior to Extubation      04/17/24 1335    Unscheduled  Oxygen Therapy- Nasal Cannula; Titrate 1-6 LPM Per SpO2; 90 - 95%  Continuous PRN    "    24 1335    Unscheduled  CBC & Differential  As Needed        Comments: Chest Tube Drainage Greater Than 100mL/hr      24 1335    Unscheduled  aPTT  As Needed        Comments: Chest Tube Drainage Greater Than 100mL/hr      24 1335    Unscheduled  Protime-INR  As Needed        Comments: Chest Tube Drainage Greater Than 100mL/hr      24 1335    Unscheduled  Fibrin Split Products  As Needed        Comments: Chest Tube Drainage Greater Than 100mL/hr      24 1335    Unscheduled  Fibrinogen  As Needed        Comments: Chest Tube Drainage Greater Than 100mL/hr      24 1335                     Physician Progress Notes (last 48 hours)        Mehnaz Pizano APRN at 24 0768       Attestation signed by Sandeep Duran MD at 24 1503    I have personally seen and examined Nic Castro and reviewed the record. Agree with the aforementioned plan rendered jointly with Mehnaz Pizano.    Doing well today.  Chest tubes with slightly too much out.  Likely out tomorrow.  Diuresis starting today.  OOB walking, intensify bowel regimen.  Plan CTA prior to d/c.  Overall happy with progress.    Sandeep Duran M.D.  Cardiothoracic Surgeon                    Patient name: Nic Castro  Patient : 1975  VISIT # [id number]  MR #[id number]    Procedure:Procedure(s):  AORTIC ROOT REPLACEMENT, ASCENDING AORTIC HEMIARCH REPLACEMENT, AORTIC VALVE REPLACEMENT WITH MECHANICAL VALVE WITH CIRCULATORY ARREST, TRANSESOPHAGEAL ECHOCARDIOGRAM  Procedure Date:2024  POD:2 Days Post-Op    Subjective     Transferred to  yesterday afternoon.  He is on 2 L nasal cannula.  Weight is up 5 pounds and he is 11 pounds above his baseline.  INR today is 1.2.  No bowel movement.  Walking 200 feet with physical therapy.    Telemetry: Sinus rhythm 84-94  IV drips: None      Objective     Visit Vitals  /67 (BP Location: Left arm, Patient Position: Sitting)   Pulse 83   Temp 98.2 °F (36.8 °C)  "(Oral)   Resp 16   Ht 180.3 cm (71\")   Wt 95.3 kg (210 lb 3.2 oz)   SpO2 95%   BMI 29.32 kg/m²   Baseline weight 199 pounds    Intake/Output Summary (Last 24 hours) at 4/19/2024 0908  Last data filed at 4/19/2024 0845  Gross per 24 hour   Intake 500 ml   Output 2635 ml   Net -2135 ml     MCT: 320 mL in 24 hours, serosanguineous, no airleak    Lab:     CBC:  Results from last 7 days   Lab Units 04/19/24  0303 04/18/24  0248 04/17/24  1749   WBC 10*3/mm3 16.58* 15.20* 11.19*   HEMATOCRIT % 26.1* 29.5* 38.5   PLATELETS 10*3/mm3 109* 132* 154          BMP:  Results from last 7 days   Lab Units 04/19/24  0303 04/18/24  0248 04/17/24  1749   SODIUM mmol/L 136 138 141   POTASSIUM mmol/L 4.8 4.7 4.4   CHLORIDE mmol/L 104 105 107   CO2 mmol/L 25.0 24.0 26.0   GLUCOSE mg/dL 122* 121* 189*   BUN mg/dL 21* 15 11   CREATININE mg/dL 0.65* 0.74* 0.71*          COAG:  Results from last 7 days   Lab Units 04/19/24  0303 04/17/24  1336 04/17/24  1228   INR  1.21*   < > 1.50*   APTT seconds  --   --  42.1*    < > = values in this interval not displayed.       IMAGES:       Imaging Results (Last 24 Hours)       Procedure Component Value Units Date/Time    XR Chest 1 View [[id number]] Collected: 04/19/24 0703     Updated: 04/19/24 0708    Narrative:      XR CHEST 1 VW- 4/19/2024 2:32 AM     HISTORY: Post-Op Heart Surgery       COMPARISON: Chest x-ray dated 1424     FINDINGS:  Upright frontal radiograph of the chest was obtained     Postop aortic root, valve and hemiarch replacement. There is mild  bibasilar atelectasis. No other consolidation. No pleural effusion or  pneumothorax. Surgical drains remain in place. Heart size is stable.  Discontinued vascular sheath.       Impression:      1.  Discontinued vascular sheath. Otherwise stable.  2.  Mild bibasilar atelectasis.     This report was signed and finalized on 4/19/2024 7:05 AM by Dr Balaji Chavez.             CXR: Mediastinal chest tubes in stable position with no pneumothorax.  " Bibasilar atelectasis.    Physical Exam:  General: Alert, oriented. No apparent distress.   Cardiovascular: Regular rate and rhythm without murmur, rubs, or gallops.    Pulmonary: Clear to auscultation bilaterally without wheezing, rubs, or rales.  Chest: Sternotomy incision clean, dry, and intact. Sternum stable. No clicks.  Mediastinal chest tubes to 20 cm suction. No air leak. Fluid is serosanguineous.   Abdomen: Soft, nondistended, and nontender.  Extremities: Warm, moves all extremities.  Mild lower extremity edema.  Neurologic:  Grossly intact with no focal deficits.           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  Daily PT/INR.  Goal INR is 2-3  Diurese  Wean supplemental O2 as tolerated  Multimodality pain control regimen  Encourage pulmonary toilet and ambulation  Routine postcardiac surgery care  Discussed with patient and nursing  Keep chest tubes 1 more day as output is too high for removal today.  Plan on repeat CTA chest prior to discharge        ISABEL Yin  04/19/24  09:08 CDT      Electronically signed by Sandeep Duran MD at 04/19/24 1505       Consult Notes (last 48 hours)  Notes from 04/18/24 0753 through 04/20/24 0753   No notes of this type exist for this encounter.

## 2024-04-20 NOTE — PLAN OF CARE
Goal Outcome Evaluation:  Plan of Care Reviewed With: patient        Progress: no change  Outcome Evaluation: Pt has reports of ongoing pain this shift, despite being given prn medications he still reports pain at high levels after they have been administered. When pain is reassessed patient is seen barely awake and at one point seemed to barely be able to keep his eyes open. Pt O2 when resting dropped at various point to 88, 2LNC put on while resting for support. Pt was able to ambulate the box to the nurses station before looking pale, O2 sats were 88 but he quickly recovered. Pt not abiding by sternal precautions and was found several times putting tension on chest tube with movement. Bed alarm set. EKG obtained for precaution, see chart. S 87-97 per tele. Call light in reach.

## 2024-04-20 NOTE — THERAPY TREATMENT NOTE
Acute Care - Physical Therapy Treatment Note  [facility]     Patient Name: Nic Castro  : 1975  MRN: [id number]  Today's Date: 2024      Visit Dx:     ICD-10-CM ICD-9-CM   1. Impaired mobility [Z74.09]  Z74.09 799.89   2. Aneurysm of the ascending aorta, without rupture  I71.21 441.2     Patient Active Problem List   Diagnosis    Aneurysm of the ascending aorta, without rupture    Chest pain    Dyslipidemia    Tobacco use    Overweight with body mass index (BMI) of 28 to 28.9 in adult    Aortic root aneurysm     Past Medical History:   Diagnosis Date    AAA (abdominal aortic aneurysm)     Anxiety     Arthritis     GERD (gastroesophageal reflux disease)     Hepatitis C     Hyperlipidemia     Hypertension     Pancreatitis     Seizure     from head injury    Substance abuse      Past Surgical History:   Procedure Laterality Date    ANKLE LIGAMENT RECONSTRUCTION Left 2015    ASCENDING ARCH/HEMIARCH REPLACEMENT N/A 2024    Procedure: AORTIC ROOT REPLACEMENT, ASCENDING AORTIC HEMIARCH REPLACEMENT, AORTIC VALVE REPLACEMENT WITH MECHANICAL VALVE WITH CIRCULATORY ARREST, TRANSESOPHAGEAL ECHOCARDIOGRAM;  Surgeon: Sandeep Duran MD;  Location: [facility] OR;  Service: Cardiothoracic;  Laterality: N/A;    CARDIAC CATHETERIZATION N/A 2023    Procedure: Left Heart Cath;  Surgeon: Nikolas Ramos MD;  Location: [facility] CATH INVASIVE LOCATION;  Service: Cardiology;  Laterality: N/A;    TOOTH EXTRACTION      Full mouth extraction     PT Assessment (Last 12 Hours)       PT Evaluation and Treatment       Row Name 24 09          Physical Therapy Time and Intention    Subjective Information complains of;pain;fatigue  -ALFREDO     Document Type therapy note (daily note)  -ALFREDO     Mode of Treatment physical therapy  -     Patient Effort fair  -ALFREDO     Comment Impulsive, Over uses arms. Worked on purse lip breathing and incentive spirometer. Reviewed sternal restrictions  -ALFREDO       Row Name 24 0989           General Information    Existing Precautions/Restrictions fall;sternal  chest tube  -ALFREDO       Row Name 04/20/24 0921          Pain    Pretreatment Pain Rating 6/10  -ALFREDO     Posttreatment Pain Rating 8/10  -ALFREDO     Pain Location incisional  -ALFREDO     Pain Location - chest  -ALFREDO       Row Name 04/20/24 0921          Sit-Stand Transfer    Sit-Stand Badger (Transfers) supervision  -ALFREDO       Row Name 04/20/24 0921          Stand-Sit Transfer    Stand-Sit Badger (Transfers) verbal cues;standby assist  -ALFREDO       Row Name 04/20/24 0921          Gait/Stairs (Locomotion)    Badger Level (Gait) contact guard  -ALFREDO     Distance in Feet (Gait) 560  -ALFREDO     Deviations/Abnormal Patterns (Gait) stride length decreased  -ALFREDO     Comment, (Gait/Stairs) Cues for purse lip breathing, using pillow when coughs  -ALFREDO       Row Name 04/20/24 0921          Motor Skills    Comments, Therapeutic Exercise warm ups x15  -ALFREDO       Row Name             Wound 04/17/24 0809 sternal Incision    Wound - Properties Group Placement Date: 04/17/24  - Placement Time: 0809  - Present on Original Admission: N  - Location: sternal  -LH Primary Wound Type: Incision  -LH    Retired Wound - Properties Group Placement Date: 04/17/24  - Placement Time: 0809  - Present on Original Admission: N  - Location: sternal  -LH Primary Wound Type: Incision  -LH    Retired Wound - Properties Group Date first assessed: 04/17/24  - Time first assessed: 0809  - Present on Original Admission: N  - Location: sternal  -LH Primary Wound Type: Incision  -LH      Row Name 04/20/24 0921          Vital Signs    Pre SpO2 (%) 96  -ALFREDO     O2 Delivery Pre Treatment --  2 lpm  -ALFREDO     Intra SpO2 (%) 85  -ALFREDO     O2 Delivery Intra Treatment --  1 lpm  -ALFREDO     Post SpO2 (%) 92  -ALFREDO     O2 Delivery Post Treatment --  2 lpm  -ALFREDO       Row Name 04/20/24 0921          Positioning and Restraints    Pre-Treatment Position sitting in chair/recliner  -ALFREDO     In Chair  reclined;call light within reach;encouraged to call for assist;RUE elevated;LUE elevated  -               User Key  (r) = Recorded By, (t) = Taken By, (c) = Cosigned By      Initials Name Provider Type    Ellen Leslie, DOUG Physical Therapist Assistant    Sofi Serrano, RN Registered Nurse                    Physical Therapy Education       Title: PT OT SLP Therapies (In Progress)       Topic: Physical Therapy (In Progress)       Point: Mobility training (Done)       Learning Progress Summary             Patient Acceptance, E, VU by ALONA at 4/18/2024 0853    Comment: safe mobility, fall risk, sternal precautions                         Point: Home exercise program (Done)       Learning Progress Summary             Patient Acceptance, E, VU by ALONA at 4/18/2024 0853    Comment: safe mobility, fall risk, sternal precautions                         Point: Body mechanics (Done)       Learning Progress Summary             Patient Acceptance, E, VU by ALONA at 4/18/2024 0853    Comment: safe mobility, fall risk, sternal precautions                         Point: Precautions (In Progress)       Learning Progress Summary             Patient Acceptance, E,D, NR by ALFREDO at 4/20/2024 1142    Comment: sternal restrictions    Acceptance, E, VU by ALONA at 4/18/2024 0853    Comment: safe mobility, fall risk, sternal precautions                                         User Key       Initials Effective Dates Name Provider Type Discipline    ALFREDO 02/03/23 -  Ellen Huggins PTA Physical Therapist Assistant PT    ALONA 02/22/24 -  Sandeep Person, CARLITA Student PT Student PT                  PT Recommendation and Plan         Outcome Measures       Row Name 04/20/24 1100             How much help from another person do you currently need...    Turning from your back to your side while in flat bed without using bedrails? 4  -ALFREDO      Moving from lying on back to sitting on the side of a flat bed without bedrails? 3  -ALFREDO      Moving to and  from a bed to a chair (including a wheelchair)? 3  -ALFREDO      Standing up from a chair using your arms (e.g., wheelchair, bedside chair)? 3  -ALFREDO      Climbing 3-5 steps with a railing? 3  -ALFREDO      To walk in hospital room? 3  -ALFREDO      AM-PAC 6 Clicks Score (PT) 19  -ALFREDO      Highest Level of Mobility Goal 6 --> Walk 10 steps or more  -ALFREDO                User Key  (r) = Recorded By, (t) = Taken By, (c) = Cosigned By      Initials Name Provider Type    Ellen Leslie PTA Physical Therapist Assistant                     Time Calculation:    PT Charges       Row Name 04/20/24 1146             Time Calculation    Start Time 0921  -ALFREDO      Stop Time 0946  -ALFREDO      Time Calculation (min) 25 min  -ALFREDO         Timed Charges    35943 - Gait Training Minutes  25  -ALFREDO         Total Minutes    Timed Charges Total Minutes 25  -ALFREDO       Total Minutes 25  -ALFREDO                User Key  (r) = Recorded By, (t) = Taken By, (c) = Cosigned By      Initials Name Provider Type    Ellen Leslie PTA Physical Therapist Assistant                  Therapy Charges for Today       Code Description Service Date Service Provider Modifiers Qty    59845085989 HC GAIT TRAINING EA 15 MIN 4/20/2024 Ellen Huggins PTA GP 2            PT G-Codes  Outcome Measure Options: AM-PAC 6 Clicks Basic Mobility (PT)  AM-PAC 6 Clicks Score (PT): 19    Ellen Huggins PTA  4/20/2024

## 2024-04-20 NOTE — PLAN OF CARE
Goal Outcome Evaluation:  Plan of Care Reviewed With: patient        Progress: no change  Outcome Evaluation: VSS. S/ST  with PVCs on tele. Patient has slept most of day and seems groggy when awake. Patient has often not adhered to sternal precautions today by pushing/pulling self with arms. Education and reminders given multiple times. Patient sounds congested but will not deep breathe and cough, even with multiple prompts and education. Pacer wires were pulled this AM; twenty minutes afterwards, patient exited bed by himself, causing chest tubes to be strained, and proceed to urinate onto floor and in trash can. Bed alarm did go off and this nurse did respond immediately, but he was already in standing position upon my arrival, urinating. Pt stated that he didn't have time to wait for help, but was reminded that the urinal was located on the bedside table within reach. Bed/chair alarms have been utilized all shift. Patient is getting 800 on IS but is not using it without prompting.Resting O2 was increased to 3L today; saturation was at 87% on 2L. Pt c/o pain rated at 9/10 once this morning; PRN roxicodone 10mg given per order. Patient was incontinent of urine during CABG care, per NA. Minimal output into chest tubes this shift.

## 2024-04-20 NOTE — PLAN OF CARE
Goal Outcome Evaluation:   Patient stood and ambulated 560' with CGA. Worked on purse lip breathing. Worked on incentive spirometer. Walked on 1 lpm and dropped to 85%. Returned to 92% when placed to 2 lpm. Time spent emphasizing importance of using spirometer. Discussion concerning over using Ue's and bad results from doing so. Patient will continue to benefit from safety education and increased activity.

## 2024-04-20 NOTE — PROGRESS NOTES
[facility] Cardiothoracic Surgery  PROGRESS NOTE     Subjective:  Patient just finished walking in halls with PT. Continues to complain of severe postoperative pain.     Objective:      Intake/Output Summary (Last 24 hours) at 4/20/2024 1226  Last data filed at 4/20/2024 1055  Gross per 24 hour   Intake 240 ml   Output 2365 ml   Net -2125 ml       CT Output:   MCT to -20 cm suction with 340 ml serosanguinous drainage in the past 24 hours. No air leak.     Gtt's:  None    Wt preop: 202 lbs  Wt current:       04/20/24  0500   Weight: 92.7 kg (204 lb 6.4 oz)         PE:  Vitals:    04/20/24 1119   BP: 108/58   Pulse: 81   Resp: 18   Temp: 97.6 °F (36.4 °C)   SpO2: 92%     GENERAL: NAD, resting comfortably, normal color  CARDIOVASCULAR: Normal HT with appropriate click, RRR. No murmurs, gallops or rubs.   PULMONARY: Fairly clear bilateral breath sounds with scattered rhonchi. No wheezes or rhonchi.   ABDOMEN: Soft, non-tender with active bowel sounds.   EXTREMITIES: No clubbing or cyanosis. Mild BLE edema. Pedal pulses palpated.   INCISIONS: Sternal incision with edges well approximated, but mild separation in lower portion of incision. Noted serous drainage from distal incision. Sternum appears stable.         Lab Results (last 72 hours)       Procedure Component Value Units Date/Time    Basic Metabolic Panel [[id number]]  (Abnormal) Collected: 04/20/24 0402    Specimen: Blood Updated: 04/20/24 0436     Glucose 106 mg/dL      BUN 17 mg/dL      Creatinine 0.57 mg/dL      Sodium 138 mmol/L      Potassium 4.0 mmol/L      Chloride 103 mmol/L      CO2 28.0 mmol/L      Calcium 8.9 mg/dL      BUN/Creatinine Ratio 29.8     Anion Gap 7.0 mmol/L      eGFR 119.4 mL/min/1.73     Narrative:      GFR Normal >60  Chronic Kidney Disease <60  Kidney Failure <15      CBC (No Diff) [[id number]]  (Abnormal) Collected: 04/20/24 0402    Specimen: Blood Updated: 04/20/24 0424     WBC 14.81 10*3/mm3      RBC 3.09 10*6/mm3       Hemoglobin 8.4 g/dL      Hematocrit 26.5 %      MCV 85.8 fL      MCH 27.2 pg      MCHC 31.7 g/dL      RDW 15.3 %      RDW-SD 47.8 fl      MPV 10.9 fL      Platelets 116 10*3/mm3     Protime-INR [[id number]]  (Abnormal) Collected: 04/20/24 0402    Specimen: Blood Updated: 04/20/24 0424     Protime 23.9 Seconds      INR 2.04    Magnesium [[id number]]  (Normal) Collected: 04/19/24 0303    Specimen: Blood Updated: 04/19/24 0434     Magnesium 2.2 mg/dL     Basic Metabolic Panel [[id number]]  (Abnormal) Collected: 04/19/24 0303    Specimen: Blood Updated: 04/19/24 0409     Glucose 122 mg/dL      BUN 21 mg/dL      Creatinine 0.65 mg/dL      Sodium 136 mmol/L      Potassium 4.8 mmol/L      Chloride 104 mmol/L      CO2 25.0 mmol/L      Calcium 8.8 mg/dL      BUN/Creatinine Ratio 32.3     Anion Gap 7.0 mmol/L      eGFR 114.8 mL/min/1.73     Narrative:      GFR Normal >60  Chronic Kidney Disease <60  Kidney Failure <15      Protime-INR [[id number]]  (Abnormal) Collected: 04/19/24 0303    Specimen: Blood Updated: 04/19/24 0358     Protime 15.8 Seconds      INR 1.21    CBC (No Diff) [[id number]]  (Abnormal) Collected: 04/19/24 0303    Specimen: Blood Updated: 04/19/24 0352     WBC 16.58 10*3/mm3      RBC 3.04 10*6/mm3      Hemoglobin 8.4 g/dL      Hematocrit 26.1 %      MCV 85.9 fL      MCH 27.6 pg      MCHC 32.2 g/dL      RDW 15.7 %      RDW-SD 49.4 fl      MPV 11.1 fL      Platelets 109 10*3/mm3     POC Glucose Once [[id number]]  (Abnormal) Collected: 04/18/24 2058    Specimen: Blood Updated: 04/18/24 2109     Glucose 152 mg/dL      Comment: : [id number] Coding[place] RebeccaMeter ID: [id number]       Tissue Pathology Exam [[id number]] Collected: 04/17/24 1041    Specimen: Tissue from Aortic Leaflet, Tissue from Aortic Aneurysm Updated: 04/18/24 0801    POC Glucose Once [[id number]]  (Abnormal) Collected: 04/18/24 0629    Specimen: Blood Updated: 04/18/24 0650     Glucose 157 mg/dL      Comment: : [id number] Chris Gonzales ID:  [id number]       POC Glucose Once [[id number]]  (Abnormal) Collected: 04/18/24 0523    Specimen: Blood Updated: 04/18/24 0544     Glucose 158 mg/dL      Comment: : [id number] York AlexyerMeter ID: [id number]       Calcium, Ionized [[id number]] Collected: 04/18/24 0410    Specimen: Blood Updated: 04/18/24 0408     Ionized Calcium 4.85 mg/dL      Collected by [id number]     Comment: Meter: [id number]     :  [id number]       Renal Function Panel [[id number]]  (Abnormal) Collected: 04/18/24 0248    Specimen: Blood Updated: 04/18/24 0330     Glucose 121 mg/dL      BUN 15 mg/dL      Creatinine 0.74 mg/dL      Sodium 138 mmol/L      Potassium 4.7 mmol/L      Chloride 105 mmol/L      CO2 24.0 mmol/L      Calcium 8.7 mg/dL      Albumin 4.3 g/dL      Phosphorus 3.7 mg/dL      Anion Gap 9.0 mmol/L      BUN/Creatinine Ratio 20.3     eGFR 110.4 mL/min/1.73     Narrative:      GFR Normal >60  Chronic Kidney Disease <60  Kidney Failure <15      Magnesium [[id number]]  (Normal) Collected: 04/18/24 0248    Specimen: Blood Updated: 04/18/24 0330     Magnesium 1.7 mg/dL     Protime-INR [[id number]]  (Abnormal) Collected: 04/18/24 0248    Specimen: Blood Updated: 04/18/24 0323     Protime 15.5 Seconds      INR 1.18    CBC & Differential [[id number]]  (Abnormal) Collected: 04/18/24 0248    Specimen: Blood Updated: 04/18/24 0313    Narrative:      The following orders were created for panel order CBC & Differential.  Procedure                               Abnormality         Status                     ---------                               -----------         ------                     CBC Auto Differential[[id number]]        Abnormal            Final result                 Please view results for these tests on the individual orders.    CBC Auto Differential [[id number]]  (Abnormal) Collected: 04/18/24 0248    Specimen: Blood Updated: 04/18/24 0313     WBC 15.20 10*3/mm3      RBC 3.47 10*6/mm3      Hemoglobin 9.5 g/dL       Hematocrit 29.5 %      MCV 85.0 fL      MCH 27.4 pg      MCHC 32.2 g/dL      RDW 15.1 %      RDW-SD 46.7 fl      MPV 9.9 fL      Platelets 132 10*3/mm3      Neutrophil % 84.5 %      Lymphocyte % 7.2 %      Monocyte % 7.9 %      Eosinophil % 0.0 %      Basophil % 0.1 %      Neutrophils, Absolute 12.84 10*3/mm3      Lymphocytes, Absolute 1.09 10*3/mm3      Monocytes, Absolute 1.20 10*3/mm3      Eosinophils, Absolute 0.00 10*3/mm3      Basophils, Absolute 0.02 10*3/mm3     POC Glucose Once [[id number]]  (Normal) Collected: 04/18/24 0252    Specimen: Blood Updated: 04/18/24 0313     Glucose 123 mg/dL      Comment: : [id number] York SawyerMeter ID: [id number]       POC Glucose Once [[id number]]  (Normal) Collected: 04/18/24 0156    Specimen: Blood Updated: 04/18/24 0217     Glucose 128 mg/dL      Comment: : [id number] York SawyerMeter ID: [id number]       POC Glucose Once [[id number]]  (Abnormal) Collected: 04/17/24 2349    Specimen: Blood Updated: 04/18/24 0006     Glucose 143 mg/dL      Comment: : [id number] York SawyerMeter ID: [id number]       Blood Gas, Arterial - [[id number]]  (Abnormal) Collected: 04/17/24 2318    Specimen: Arterial Blood Updated: 04/17/24 2317     Site Arterial Line     Jhon's Test N/A     pH, Arterial 7.365 pH units      pCO2, Arterial 41.5 mm Hg      pO2, Arterial 101.0 mm Hg      HCO3, Arterial 23.7 mmol/L      Base Excess, Arterial -1.6 mmol/L      Comment: 84 Value below reference range        O2 Saturation, Arterial 98.1 %      Temperature 37.0     Barometric Pressure for Blood Gas 749 mmHg      Modality Ventilator     FIO2 30 %      Ventilator Mode SPONTANEOUS     PEEP 5.0     PSV 10.0 cmH2O      Collected by [id number]     Comment: Meter: [id number]     :  [id number]        pCO2, Temperature Corrected 41.5 mm Hg      pH, Temp Corrected 7.365 pH Units      pO2, Temperature Corrected 101 mm Hg     POC Glucose Once [[id number]]  (Abnormal) Collected: 04/17/24 7444     Specimen: Blood Updated: 04/17/24 2213     Glucose 140 mg/dL      Comment: : [id number] York SawyerMeter ID: [id number]       POC Glucose Once [[id number]]  (Abnormal) Collected: 04/17/24 2055    Specimen: Blood Updated: 04/17/24 2106     Glucose 151 mg/dL      Comment: : [id number] York SawyerMeter ID: [id number]       POC Glucose Once [[id number]]  (Abnormal) Collected: 04/17/24 1952    Specimen: Blood Updated: 04/17/24 2012     Glucose 160 mg/dL      Comment: : [id number] York SawyerMeter ID: [id number]       POC Glucose Once [[id number]]  (Abnormal) Collected: 04/17/24 1850    Specimen: Blood Updated: 04/17/24 1901     Glucose 175 mg/dL      Comment: : [id number] Vandana MalloryMeter ID: [id number]       STAT Lactic Acid, Reflex [[id number]]  (Abnormal) Collected: 04/17/24 1749    Specimen: Blood Updated: 04/17/24 1820     Lactate 2.9 mmol/L     Renal Function Panel [[id number]]  (Abnormal) Collected: 04/17/24 1749    Specimen: Blood Updated: 04/17/24 1816     Glucose 189 mg/dL      BUN 11 mg/dL      Creatinine 0.71 mg/dL      Sodium 141 mmol/L      Potassium 4.4 mmol/L      Comment: Slight hemolysis detected by analyzer. Result may be falsely elevated.        Chloride 107 mmol/L      CO2 26.0 mmol/L      Calcium 9.6 mg/dL      Albumin 4.1 g/dL      Phosphorus 3.5 mg/dL      Anion Gap 8.0 mmol/L      BUN/Creatinine Ratio 15.5     eGFR 111.8 mL/min/1.73     Narrative:      GFR Normal >60  Chronic Kidney Disease <60  Kidney Failure <15      POC Glucose Once [[id number]]  (Abnormal) Collected: 04/17/24 1748    Specimen: Blood Updated: 04/17/24 1759     Glucose 183 mg/dL      Comment: : [id number] Richard EmilyMeter ID: [id number]       CBC (No Diff) [[id number]]  (Abnormal) Collected: 04/17/24 1749    Specimen: Blood Updated: 04/17/24 1758     WBC 11.19 10*3/mm3      RBC 4.54 10*6/mm3      Hemoglobin 12.4 g/dL      Hematocrit 38.5 %      MCV 84.8 fL      MCH 27.3 pg      MCHC 32.2 g/dL      RDW  14.8 %      RDW-SD 45.8 fl      MPV 9.7 fL      Platelets 154 10*3/mm3     POC Glucose Once [[id number]]  (Abnormal) Collected: 04/17/24 1640    Specimen: Blood Updated: 04/17/24 1653     Glucose 194 mg/dL      Comment: : [id number] Richard SalgueroilyMeter ID: [id number]       Blood Gas, Arterial - [[id number]]  (Abnormal) Collected: 04/17/24 1552    Specimen: Arterial Blood Updated: 04/17/24 1550     Site Arterial Line     Jhon's Test N/A     pH, Arterial 7.356 pH units      pCO2, Arterial 47.2 mm Hg      Comment: 83 Value above reference range        pO2, Arterial 90.6 mm Hg      HCO3, Arterial 26.4 mmol/L      Comment: 83 Value above reference range        Base Excess, Arterial 0.4 mmol/L      O2 Saturation, Arterial 97.1 %      Temperature 37.0     Barometric Pressure for Blood Gas 746 mmHg      Modality Ventilator     FIO2 30 %      Ventilator Mode SIMV     Set Tidal Volume 550     Set Mech Resp Rate 20.0     PEEP 8.0     PSV 10.0 cmH2O      Collected by [id number]     Comment: Meter: [id number]     :  [id number]        pCO2, Temperature Corrected 47.2 mm Hg      pH, Temp Corrected 7.356 pH Units      pO2, Temperature Corrected 90.6 mm Hg     POC Glucose Once [[id number]]  (Abnormal) Collected: 04/17/24 1537    Specimen: Blood Updated: 04/17/24 1548     Glucose 191 mg/dL      Comment: : [id number] Ramos EmilyMeter ID: [id number]       POC Glucose Once [[id number]]  (Abnormal) Collected: 04/17/24 1431    Specimen: Blood Updated: 04/17/24 1445     Glucose 160 mg/dL      Comment: : [id number] Ramos EmilyMeter ID: [id number]       Renal Function Panel [[id number]]  (Abnormal) Collected: 04/17/24 1336    Specimen: Blood Updated: 04/17/24 1423     Glucose 160 mg/dL      BUN 11 mg/dL      Creatinine 0.65 mg/dL      Sodium 143 mmol/L      Potassium 4.0 mmol/L      Comment: Specimen hemolyzed.  Result may be falsely elevated.        Chloride 111 mmol/L      CO2 24.0 mmol/L      Calcium 9.7 mg/dL       Albumin 3.5 g/dL      Phosphorus 3.7 mg/dL      Anion Gap 8.0 mmol/L      BUN/Creatinine Ratio 16.9     eGFR 114.8 mL/min/1.73     Narrative:      GFR Normal >60  Chronic Kidney Disease <60  Kidney Failure <15      Lactic Acid, Plasma [[id number]]  (Abnormal) Collected: 04/17/24 1336    Specimen: Blood Updated: 04/17/24 1418     Lactate 3.3 mmol/L     Protime-INR [[id number]]  (Abnormal) Collected: 04/17/24 1336    Specimen: Blood Updated: 04/17/24 1411     Protime 17.2 Seconds      INR 1.35    Fibrinogen [[id number]]  (Abnormal) Collected: 04/17/24 1336    Specimen: Blood Updated: 04/17/24 1411     Fibrinogen 178 mg/dL     CBC (No Diff) [[id number]]  (Abnormal) Collected: 04/17/24 1336    Specimen: Blood Updated: 04/17/24 1404     WBC 18.29 10*3/mm3      RBC 4.17 10*6/mm3      Hemoglobin 11.5 g/dL      Hematocrit 35.6 %      MCV 85.4 fL      MCH 27.6 pg      MCHC 32.3 g/dL      RDW 14.8 %      RDW-SD 45.9 fl      MPV 10.0 fL      Platelets 115 10*3/mm3     Calcium, Ionized [[id number]] Collected: 04/17/24 1350    Specimen: Blood Updated: 04/17/24 1349     Ionized Calcium 5.34 mg/dL      Collected by [id number]     Comment: Meter: [id number]     :  [id number]       Blood Gas, Arterial With Co-Ox [[id number]]  (Abnormal) Collected: 04/17/24 1349    Specimen: Arterial Blood Updated: 04/17/24 1347     Site Arterial Line     Jhon's Test N/A     pH, Arterial 7.355 pH units      pCO2, Arterial 42.6 mm Hg      pO2, Arterial 221.0 mm Hg      Comment: 83 Value above reference range        HCO3, Arterial 23.8 mmol/L      Base Excess, Arterial -1.8 mmol/L      Comment: 84 Value below reference range        O2 Saturation, Arterial 99.6 %      Comment: 83 Value above reference range        Hemoglobin, Blood Gas 12.1 g/dL      Comment: 84 Value below reference range        Hematocrit, Blood Gas 37.0 %      Comment: 84 Value below reference range        Oxyhemoglobin 97.6 %      Methemoglobin 1.10 %      Carboxyhemoglobin 0.9  %      A-a DO2 153.5 mmHg      Temperature 37.0     Sodium, Arterial 142 mmol/L      Potassium, Arterial 3.8 mmol/L      Barometric Pressure for Blood Gas 747 mmHg      Modality Ventilator     FIO2 60 %      Ventilator Mode SIMV     Set Tidal Volume 550     Set Mech Resp Rate 20.0     PEEP 10.0     PSV 10.0 cmH2O      Collected by [id number]     Comment: Meter: [id number]     :  [id number]        pH, Temp Corrected 7.355 pH Units      pCO2, Temperature Corrected 42.6 mm Hg      pO2, Temperature Corrected 221 mm Hg     CBC & Differential [[id number]]  (Abnormal) Collected: 04/17/24 1228    Specimen: Blood, Arterial Line Updated: 04/17/24 1347    Narrative:      The following orders were created for panel order CBC & Differential.  Procedure                               Abnormality         Status                     ---------                               -----------         ------                     CBC Auto Differential[[id number]]        Abnormal            Final result                 Please view results for these tests on the individual orders.    CBC Auto Differential [[id number]]  (Abnormal) Collected: 04/17/24 1228    Specimen: Blood, Arterial Line Updated: 04/17/24 1347     WBC 15.37 10*3/mm3      RBC 3.48 10*6/mm3      Hemoglobin 9.9 g/dL      Hematocrit 30.1 %      MCV 86.5 fL      MCH 28.4 pg      MCHC 32.9 g/dL      RDW 14.7 %      RDW-SD 46.5 fl      MPV 9.8 fL      Platelets 109 10*3/mm3      Neutrophil % 83.8 %      Lymphocyte % 10.0 %      Monocyte % 3.7 %      Eosinophil % 0.9 %      Basophil % 0.5 %      Immature Grans % 1.1 %      Neutrophils, Absolute 12.85 10*3/mm3      Lymphocytes, Absolute 1.53 10*3/mm3      Monocytes, Absolute 0.57 10*3/mm3      Eosinophils, Absolute 0.14 10*3/mm3      Basophils, Absolute 0.07 10*3/mm3      Immature Grans, Absolute 0.17 10*3/mm3      nRBC 0.0 /100 WBC     aPTT [[id number]]  (Abnormal) Collected: 04/17/24 1228    Specimen: Blood, Arterial Line Updated:  04/17/24 1342     PTT 42.1 seconds     Fibrinogen [[id number]]  (Abnormal) Collected: 04/17/24 1228    Specimen: Blood, Arterial Line Updated: 04/17/24 1250     Fibrinogen 180 mg/dL     Protime-INR [[id number]]  (Abnormal) Collected: 04/17/24 1228    Specimen: Blood, Arterial Line Updated: 04/17/24 1250     Protime 18.7 Seconds      INR 1.50    CBC (No Diff) [[id number]]  (Abnormal) Collected: 04/17/24 1228    Specimen: Blood, Arterial Line Updated: 04/17/24 1241     WBC 15.37 10*3/mm3      RBC 3.48 10*6/mm3      Hemoglobin 9.9 g/dL      Hematocrit 30.1 %      MCV 86.5 fL      MCH 28.4 pg      MCHC 32.9 g/dL      RDW 14.7 %      RDW-SD 46.5 fl      MPV 9.8 fL      Platelets 109 10*3/mm3             Rhythm: Normal sinus rhythm    CXR: Normal postoperative changes with small bilateral pleural effusions.     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 Patient is doing fairly well, except for pulling up with arms. On warfarin with daily monitoring of PT/INR, metoprolol 12.5 mg BID.     Dyslipidemia - on statin    GERD - on PPI    Hx seizures - on Klonopin    Hx ETOH abuse - no evidence of withdrawal    Daily cigarette smoker    Decrease warfarin to 2 mg nightly. Recheck PT/INR in AM. Remove pacing wires. Keep MCT. Will adjust pain medication - add lidoderm patch.       ISABEL Watson

## 2024-04-20 NOTE — PLAN OF CARE
Goal Outcome Evaluation:   Patient stands with supervision. Ambulated 575 with an inconsistent gait speed. Conversation concerning the importance of not pushing and pulling with arms.

## 2024-04-21 ENCOUNTER — APPOINTMENT (OUTPATIENT)
Dept: GENERAL RADIOLOGY | Facility: HOSPITAL | Age: 49
DRG: 221 | End: 2024-04-21
Payer: MEDICAID

## 2024-04-21 LAB
ANION GAP SERPL CALCULATED.3IONS-SCNC: 10 MMOL/L (ref 5–15)
BUN SERPL-MCNC: 17 MG/DL (ref 6–20)
BUN/CREAT SERPL: 27.9 (ref 7–25)
CALCIUM SPEC-SCNC: 8.8 MG/DL (ref 8.6–10.5)
CHLORIDE SERPL-SCNC: 98 MMOL/L (ref 98–107)
CO2 SERPL-SCNC: 26 MMOL/L (ref 22–29)
CREAT SERPL-MCNC: 0.61 MG/DL (ref 0.76–1.27)
CYTO UR: NORMAL
DEPRECATED RDW RBC AUTO: 47.5 FL (ref 37–54)
EGFRCR SERPLBLD CKD-EPI 2021: 117 ML/MIN/1.73
ERYTHROCYTE [DISTWIDTH] IN BLOOD BY AUTOMATED COUNT: 15 % (ref 12.3–15.4)
GLUCOSE SERPL-MCNC: 94 MG/DL (ref 65–99)
HCT VFR BLD AUTO: 28.2 % (ref 37.5–51)
HGB BLD-MCNC: 8.9 G/DL (ref 13–17.7)
INR PPP: 1.94 (ref 0.91–1.09)
LAB AP CASE REPORT: NORMAL
LAB AP SPECIAL STAINS: NORMAL
Lab: NORMAL
MCH RBC QN AUTO: 27.1 PG (ref 26.6–33)
MCHC RBC AUTO-ENTMCNC: 31.6 G/DL (ref 31.5–35.7)
MCV RBC AUTO: 86 FL (ref 79–97)
PATH REPORT.FINAL DX SPEC: NORMAL
PATH REPORT.GROSS SPEC: NORMAL
PLATELET # BLD AUTO: 146 10*3/MM3 (ref 140–450)
PMV BLD AUTO: 11.1 FL (ref 6–12)
POTASSIUM SERPL-SCNC: 3.7 MMOL/L (ref 3.5–5.2)
POTASSIUM SERPL-SCNC: 4.2 MMOL/L (ref 3.5–5.2)
PROTHROMBIN TIME: 22.9 SECONDS (ref 11.8–14.8)
RBC # BLD AUTO: 3.28 10*6/MM3 (ref 4.14–5.8)
SODIUM SERPL-SCNC: 134 MMOL/L (ref 136–145)
WBC NRBC COR # BLD AUTO: 15.82 10*3/MM3 (ref 3.4–10.8)

## 2024-04-21 PROCEDURE — 85027 COMPLETE CBC AUTOMATED: CPT | Performed by: SURGERY

## 2024-04-21 PROCEDURE — 25010000002 FUROSEMIDE PER 20 MG: Performed by: NURSE PRACTITIONER

## 2024-04-21 PROCEDURE — 85610 PROTHROMBIN TIME: CPT | Performed by: NURSE PRACTITIONER

## 2024-04-21 PROCEDURE — 80048 BASIC METABOLIC PNL TOTAL CA: CPT | Performed by: SURGERY

## 2024-04-21 PROCEDURE — 25010000002 ENOXAPARIN PER 10 MG: Performed by: THORACIC SURGERY (CARDIOTHORACIC VASCULAR SURGERY)

## 2024-04-21 PROCEDURE — 97116 GAIT TRAINING THERAPY: CPT

## 2024-04-21 PROCEDURE — 84132 ASSAY OF SERUM POTASSIUM: CPT | Performed by: SURGERY

## 2024-04-21 PROCEDURE — 71045 X-RAY EXAM CHEST 1 VIEW: CPT

## 2024-04-21 RX ORDER — METOPROLOL TARTRATE 50 MG/1
25 TABLET, FILM COATED ORAL EVERY 12 HOURS SCHEDULED
Status: DISCONTINUED | OUTPATIENT
Start: 2024-04-21 | End: 2024-04-23 | Stop reason: HOSPADM

## 2024-04-21 RX ORDER — POTASSIUM CHLORIDE 750 MG/1
40 CAPSULE, EXTENDED RELEASE ORAL ONCE
Status: COMPLETED | OUTPATIENT
Start: 2024-04-21 | End: 2024-04-21

## 2024-04-21 RX ORDER — GUAIFENESIN 600 MG/1
1200 TABLET, EXTENDED RELEASE ORAL EVERY 12 HOURS SCHEDULED
Status: DISCONTINUED | OUTPATIENT
Start: 2024-04-21 | End: 2024-04-23 | Stop reason: HOSPADM

## 2024-04-21 RX ORDER — BISACODYL 10 MG
10 SUPPOSITORY, RECTAL RECTAL ONCE
Status: COMPLETED | OUTPATIENT
Start: 2024-04-21 | End: 2024-04-21

## 2024-04-21 RX ORDER — WARFARIN SODIUM 3 MG/1
3 TABLET ORAL
Status: DISCONTINUED | OUTPATIENT
Start: 2024-04-21 | End: 2024-04-23 | Stop reason: HOSPADM

## 2024-04-21 RX ORDER — POTASSIUM CHLORIDE 750 MG/1
20 CAPSULE, EXTENDED RELEASE ORAL ONCE
Status: COMPLETED | OUTPATIENT
Start: 2024-04-21 | End: 2024-04-21

## 2024-04-21 RX ADMIN — ACETAMINOPHEN 650 MG: 325 TABLET, FILM COATED ORAL at 23:38

## 2024-04-21 RX ADMIN — FUROSEMIDE 20 MG: 10 INJECTION, SOLUTION INTRAMUSCULAR; INTRAVENOUS at 08:50

## 2024-04-21 RX ADMIN — ATORVASTATIN CALCIUM 20 MG: 10 TABLET, FILM COATED ORAL at 20:19

## 2024-04-21 RX ADMIN — BISACODYL 10 MG: 5 TABLET, COATED ORAL at 08:52

## 2024-04-21 RX ADMIN — ACETAMINOPHEN 650 MG: 325 TABLET, FILM COATED ORAL at 05:40

## 2024-04-21 RX ADMIN — METHOCARBAMOL 500 MG: 500 TABLET, FILM COATED ORAL at 23:38

## 2024-04-21 RX ADMIN — FUROSEMIDE 20 MG: 10 INJECTION, SOLUTION INTRAMUSCULAR; INTRAVENOUS at 18:51

## 2024-04-21 RX ADMIN — CLONAZEPAM 0.5 MG: 0.5 TABLET ORAL at 20:19

## 2024-04-21 RX ADMIN — CLONAZEPAM 0.5 MG: 0.5 TABLET ORAL at 08:50

## 2024-04-21 RX ADMIN — POTASSIUM CHLORIDE 20 MEQ: 750 CAPSULE, EXTENDED RELEASE ORAL at 08:50

## 2024-04-21 RX ADMIN — BISACODYL 10 MG: 10 SUPPOSITORY RECTAL at 11:51

## 2024-04-21 RX ADMIN — PREGABALIN 25 MG: 25 CAPSULE ORAL at 20:19

## 2024-04-21 RX ADMIN — METHOCARBAMOL 500 MG: 500 TABLET, FILM COATED ORAL at 05:40

## 2024-04-21 RX ADMIN — QUETIAPINE FUMARATE 100 MG: 100 TABLET ORAL at 20:20

## 2024-04-21 RX ADMIN — POTASSIUM CHLORIDE 20 MEQ: 750 CAPSULE, EXTENDED RELEASE ORAL at 09:01

## 2024-04-21 RX ADMIN — GUAIFENESIN 1200 MG: 600 TABLET, EXTENDED RELEASE ORAL at 12:40

## 2024-04-21 RX ADMIN — ASPIRIN 81 MG: 81 TABLET, COATED ORAL at 08:50

## 2024-04-21 RX ADMIN — LIDOCAINE 2 PATCH: 4 PATCH TOPICAL at 08:52

## 2024-04-21 RX ADMIN — PANTOPRAZOLE SODIUM 40 MG: 40 TABLET, DELAYED RELEASE ORAL at 05:40

## 2024-04-21 RX ADMIN — ACETAMINOPHEN 650 MG: 325 TABLET, FILM COATED ORAL at 12:40

## 2024-04-21 RX ADMIN — OXYCODONE HYDROCHLORIDE 5 MG: 5 TABLET ORAL at 20:19

## 2024-04-21 RX ADMIN — POTASSIUM CHLORIDE 40 MEQ: 750 CAPSULE, EXTENDED RELEASE ORAL at 18:50

## 2024-04-21 RX ADMIN — METOPROLOL TARTRATE 25 MG: 50 TABLET, FILM COATED ORAL at 20:20

## 2024-04-21 RX ADMIN — WARFARIN SODIUM 3 MG: 3 TABLET ORAL at 18:51

## 2024-04-21 RX ADMIN — ARIPIPRAZOLE 5 MG: 5 TABLET ORAL at 09:01

## 2024-04-21 RX ADMIN — OXYCODONE HYDROCHLORIDE 5 MG: 5 TABLET ORAL at 09:01

## 2024-04-21 RX ADMIN — ENOXAPARIN SODIUM 40 MG: 100 INJECTION SUBCUTANEOUS at 08:50

## 2024-04-21 RX ADMIN — ACETAMINOPHEN 650 MG: 325 TABLET, FILM COATED ORAL at 18:51

## 2024-04-21 RX ADMIN — OXYCODONE HYDROCHLORIDE 10 MG: 10 TABLET ORAL at 04:40

## 2024-04-21 RX ADMIN — METOPROLOL TARTRATE 12.5 MG: 25 TABLET, FILM COATED ORAL at 08:51

## 2024-04-21 RX ADMIN — SERTRALINE HYDROCHLORIDE 200 MG: 100 TABLET, FILM COATED ORAL at 08:51

## 2024-04-21 RX ADMIN — METOPROLOL TARTRATE 12.5 MG: 25 TABLET, FILM COATED ORAL at 18:51

## 2024-04-21 RX ADMIN — PREGABALIN 25 MG: 25 CAPSULE ORAL at 08:50

## 2024-04-21 RX ADMIN — POTASSIUM CHLORIDE 20 MEQ: 750 CAPSULE, EXTENDED RELEASE ORAL at 18:51

## 2024-04-21 RX ADMIN — METHOCARBAMOL 500 MG: 500 TABLET, FILM COATED ORAL at 14:39

## 2024-04-21 RX ADMIN — GUAIFENESIN 1200 MG: 600 TABLET, EXTENDED RELEASE ORAL at 20:20

## 2024-04-21 NOTE — THERAPY TREATMENT NOTE
Acute Care - Physical Therapy Treatment Note  [facility]     Patient Name: Nic Castro  : 1975  MRN: [id number]  Today's Date: 2024      Visit Dx:     ICD-10-CM ICD-9-CM   1. Impaired mobility [Z74.09]  Z74.09 799.89   2. Aneurysm of the ascending aorta, without rupture  I71.21 441.2     Patient Active Problem List   Diagnosis    Aneurysm of the ascending aorta, without rupture    Chest pain    Dyslipidemia    Tobacco use    Overweight with body mass index (BMI) of 28 to 28.9 in adult    Aortic root aneurysm     Past Medical History:   Diagnosis Date    AAA (abdominal aortic aneurysm)     Anxiety     Arthritis     GERD (gastroesophageal reflux disease)     Hepatitis C     Hyperlipidemia     Hypertension     Pancreatitis     Seizure     from head injury    Substance abuse      Past Surgical History:   Procedure Laterality Date    ANKLE LIGAMENT RECONSTRUCTION Left 2015    ASCENDING ARCH/HEMIARCH REPLACEMENT N/A 2024    Procedure: AORTIC ROOT REPLACEMENT, ASCENDING AORTIC HEMIARCH REPLACEMENT, AORTIC VALVE REPLACEMENT WITH MECHANICAL VALVE WITH CIRCULATORY ARREST, TRANSESOPHAGEAL ECHOCARDIOGRAM;  Surgeon: Sandeep Duran MD;  Location: [facility] OR;  Service: Cardiothoracic;  Laterality: N/A;    CARDIAC CATHETERIZATION N/A 2023    Procedure: Left Heart Cath;  Surgeon: Nikolas Ramos MD;  Location: [facility] CATH INVASIVE LOCATION;  Service: Cardiology;  Laterality: N/A;    TOOTH EXTRACTION      Full mouth extraction     PT Assessment (Last 12 Hours)       PT Evaluation and Treatment       Row Name 24 0909          Physical Therapy Time and Intention    Subjective Information complains of;fatigue;pain  -     Document Type therapy note (daily note)  -     Mode of Treatment physical therapy  -       Row Name 24 0909          General Information    Existing Precautions/Restrictions fall;oxygen therapy device and L/min;sternal  chest tube  -       Row Name 24 0909           Pain    Pretreatment Pain Rating 6/10  -ALFREDO     Posttreatment Pain Rating 8/10  -ALFREDO     Pain Location incisional  -ALFREDO     Pain Location - chest  -ALFREDO       Row Name 04/21/24 0909          Bed Mobility    Rolling Left [place] (Bed Mobility) supervision  -ALFREDO     Sidelying-Sit [place] (Bed Mobility) verbal cues;contact guard  -ALFREDO     Sit-Sidelying [place] (Bed Mobility) verbal cues;standby assist  -ALFREDO     Comment, (Bed Mobility) practiced x3  -ALFREDO       Row Name 04/21/24 0909          Sit-Stand Transfer    Sit-Stand [place] (Transfers) independent  -ALFREDO       Row Name 04/21/24 0909          Stand-Sit Transfer    Stand-Sit [place] (Transfers) independent  -ALFREDO       Row Name 04/21/24 0909          Gait/Stairs (Locomotion)    [place] Level (Gait) standby assist  -ALFREDO     Distance in Feet (Gait) 600  -ALFREDO     Deviations/Abnormal Patterns (Gait) stride length decreased  -ALFREDO     Comment, (Gait/Stairs) Continue to work on breathing, incentive spirometer  -ALFREDO       Row Name 04/21/24 0909          Motor Skills    Comments, Therapeutic Exercise warm ups x15  -ALFREDO       Row Name             Wound 04/17/24 0809 sternal Incision    Wound - Properties Group Placement Date: 04/17/24  -LH Placement Time: 0809  -LH Present on Original Admission: N  -LH Location: sternal  -LH Primary Wound Type: Incision  -LH    Retired Wound - Properties Group Placement Date: 04/17/24  -LH Placement Time: 0809  -LH Present on Original Admission: N  -LH Location: sternal  -LH Primary Wound Type: Incision  -LH    Retired Wound - Properties Group Date first assessed: 04/17/24  - Time first assessed: 0809  -LH Present on Original Admission: N  -LH Location: sternal  -LH Primary Wound Type: Incision  -LH      Row Name             Wound 04/21/24 1130 Left Abrasion    Wound - Properties Group Placement Date: 04/21/24  -AG Placement Time: 1130  -AG Present on Original Admission: N  -AG Side: Left  -AG Primary Wound Type: Abrasion  -AG     Retired Wound - Properties Group Placement Date: 04/21/24  -AG Placement Time: 1130  -AG Present on Original Admission: N  -AG Side: Left  -AG Primary Wound Type: Abrasion  -AG    Retired Wound - Properties Group Date first assessed: 04/21/24  -AG Time first assessed: 1130  -AG Present on Original Admission: N  -AG Side: Left  -AG Primary Wound Type: Abrasion  -AG      Row Name 04/21/24 0909          Vital Signs    Pre SpO2 (%) 95  -ALFREDO     O2 Delivery Pre Treatment --  2 lpm  -ALFREDO     Intra SpO2 (%) 90  -ALFREDO     O2 Delivery Intra Treatment --  2 lpm  -ALFREDO     Post SpO2 (%) 93  -ALFREDO     O2 Delivery Post Treatment --  2 lpm  -ALFREDO       Row Name 04/21/24 0909          Positioning and Restraints    Pre-Treatment Position bathroom  -ALFREDO     Post Treatment Position chair  -ALFREDO     In Chair reclined;call light within reach;encouraged to call for assist;notified nsg  -ALFREDO               User Key  (r) = Recorded By, (t) = Taken By, (c) = Cosigned By      Initials Name Provider Type    Ellen Leslie, PTA Physical Therapist Assistant    Sofi Serrano, RN Registered Nurse    Marge Tinajero RN Registered Nurse                    Physical Therapy Education       Title: PT OT SLP Therapies (In Progress)       Topic: Physical Therapy (In Progress)       Point: Mobility training (Done)       Learning Progress Summary             Patient Acceptance, E,D, DU,VU by ALFREDO at 4/21/2024 1139    Comment: bed transfers    Acceptance, E, VU by ALONA at 4/18/2024 0853    Comment: safe mobility, fall risk, sternal precautions                         Point: Home exercise program (Done)       Learning Progress Summary             Patient Acceptance, E, VU by ALONA at 4/18/2024 0853    Comment: safe mobility, fall risk, sternal precautions                         Point: Body mechanics (Done)       Learning Progress Summary             Patient Acceptance, E, VU by ALONA at 4/18/2024 0853    Comment: safe mobility, fall risk, sternal precautions                          Point: Precautions (In Progress)       Learning Progress Summary             Patient Acceptance, E,D, NR by ALFREDO at 4/20/2024 1142    Comment: sternal restrictions    Acceptance, E, VU by AJ at 4/18/2024 0853    Comment: safe mobility, fall risk, sternal precautions                                         User Key       Initials Effective Dates Name Provider Type Discipline     02/03/23 -  Ellen Huggins, DOUG Physical Therapist Assistant PT    ALONA 02/22/24 -  Sandeep Person, PT Student PT Student PT                  PT Recommendation and Plan         Outcome Measures       Row Name 04/21/24 1100 04/20/24 1100          How much help from another person do you currently need...    Turning from your back to your side while in flat bed without using bedrails? 4  -ALFREDO 4  -ALFREDO     Moving from lying on back to sitting on the side of a flat bed without bedrails? 4  -ALFREDO 3  -ALFREDO     Moving to and from a bed to a chair (including a wheelchair)? 4  -ALFREDO 3  -ALFREDO     Standing up from a chair using your arms (e.g., wheelchair, bedside chair)? 4  -ALFREDO 3  -ALFREDO     Climbing 3-5 steps with a railing? 4  -ALFREDO 3  -ALFREDO     To walk in hospital room? 4  -ALFREDO 3  -ALFREDO     AM-PAC 6 Clicks Score (PT) 24  -ALFREDO 19  -ALFREDO     Highest Level of Mobility Goal 8 --> Walked 250 feet or more  -ALFREDO 6 --> Walk 10 steps or more  -ALFREDO               User Key  (r) = Recorded By, (t) = Taken By, (c) = Cosigned By      Initials Name Provider Type     Ellen Huggins PTA Physical Therapist Assistant                     Time Calculation:    PT Charges       Row Name 04/21/24 1142             Time Calculation    Start Time 0909  -ALFREDO      Stop Time 0934  -ALFREDO      Time Calculation (min) 25 min  -ALFREDO         Timed Charges    20549 - Gait Training Minutes  25  -ALFREDO         Total Minutes    Timed Charges Total Minutes 25  -ALFREDO       Total Minutes 25  -ALFREDO                User Key  (r) = Recorded By, (t) = Taken By, (c) = Cosigned By      Initials Name Provider  Type    ALFREDO Ellen Huggins PTA Physical Therapist Assistant                  Therapy Charges for Today       Code Description Service Date Service Provider Modifiers Qty    45427745913 HC GAIT TRAINING EA 15 MIN 4/20/2024 Ellen Huggins, DOUG GP 2    64273657741 HC GAIT TRAINING EA 15 MIN 4/20/2024 Ellen Huggins, DOUG GP 1    19374775412 HC GAIT TRAINING EA 15 MIN 4/21/2024 Ellen Huggins, DOUG GP 2            PT G-Codes  Outcome Measure Options: AM-PAC 6 Clicks Basic Mobility (PT)  AM-PAC 6 Clicks Score (PT): 24    Ellen Huggins PTA  4/21/2024

## 2024-04-21 NOTE — PLAN OF CARE
Goal Outcome Evaluation:  Plan of Care Reviewed With: patient        Progress: no change  Outcome Evaluation: Patient's wife stayed with patient during the night. She had some questions and expressed her concern about the patient not being able to have a safe recovery while staying with her. Up until recently patient had been homeless and had not lived with his wife for three years. Wife is the primary caretaker for several famliy members as such she mentioned potentially having the patient go to a rehab, preferably [facility] in [place]. Wife's contact information is up to date in the chart. Patient continues to need reminders about sternal precaution. Did better about not getting out of bed while wife was in the room. He did have some incontinent episodes of urine this shift. He was still somewhat lethargic this shift and at times seemed to have a bit of confusion. S 82-94 with pvc's per tele. Call light in reach.

## 2024-04-21 NOTE — CASE MANAGEMENT/SOCIAL WORK
Continued Stay Note   [facility]     Patient Name: Nic Castro  MRN: [id number]  Today's Date: 4/21/2024    Admit Date: 4/17/2024        Discharge Plan       Row Name 04/21/24 1017       Plan    Plan Comments LOUISA has been consulted for possible rehab placement. LOUISA has spoken with PT/spouse. They have requested PT to be listed with [facility] in [place]. LOUISA is making a referral and will await bed offer or denial. There will not be anyone available to review the referral today, LOUISA will follow up with Shira in admissions Monday morning.                   Discharge Codes    No documentation.                       MEJIA Marin

## 2024-04-21 NOTE — PROGRESS NOTES
[facility] Cardiothoracic Surgery  PROGRESS NOTE     Subjective:  Patient just finished walking in halls with PT. Continues to complain of severe postoperative pain.     Objective:      Intake/Output Summary (Last 24 hours) at 4/21/2024 1105  Last data filed at 4/21/2024 0849  Gross per 24 hour   Intake 480 ml   Output 1195 ml   Net -715 ml       CT Output:   MCT to -20 cm suction with 340 ml serosanguinous drainage in the past 24 hours. No air leak.     Gtt's:  None    Wt preop: 202 lbs  Wt current:       04/21/24  0500   Weight: 90.8 kg (200 lb 3.2 oz)         PE:  Vitals:    04/21/24 0806   BP: 113/52   Pulse: 93   Resp: 18   Temp: 97.9 °F (36.6 °C)   SpO2: 96%     GENERAL: NAD, resting comfortably, normal color  CARDIOVASCULAR: Normal HT with appropriate click, RRR. No murmurs, gallops or rubs.   PULMONARY: Fairly clear bilateral breath sounds with scattered rhonchi. No wheezes or rhonchi.   ABDOMEN: Soft, non-tender with active bowel sounds.   EXTREMITIES: No clubbing or cyanosis. Mild BLE edema. Pedal pulses palpated.   INCISIONS: Sternal incision with edges well approximated, but mild separation in lower portion of incision. Noted serous drainage from distal incision. Sternum appears stable.     WBC   Date Value Ref Range Status   04/21/2024 15.82 (H) 3.40 - 10.80 10*3/mm3 Final     RBC   Date Value Ref Range Status   04/21/2024 3.28 (L) 4.14 - 5.80 10*6/mm3 Final     Hemoglobin   Date Value Ref Range Status   04/21/2024 8.9 (L) 13.0 - 17.7 g/dL Final     Hematocrit   Date Value Ref Range Status   04/21/2024 28.2 (L) 37.5 - 51.0 % Final     MCV   Date Value Ref Range Status   04/21/2024 86.0 79.0 - 97.0 fL Final     MCH   Date Value Ref Range Status   04/21/2024 27.1 26.6 - 33.0 pg Final     MCHC   Date Value Ref Range Status   04/21/2024 31.6 31.5 - 35.7 g/dL Final     RDW   Date Value Ref Range Status   04/21/2024 15.0 12.3 - 15.4 % Final     RDW-SD   Date Value Ref Range Status    04/21/2024 47.5 37.0 - 54.0 fl Final     MPV   Date Value Ref Range Status   04/21/2024 11.1 6.0 - 12.0 fL Final     Platelets   Date Value Ref Range Status   04/21/2024 146 140 - 450 10*3/mm3 Final     Glucose   Date Value Ref Range Status   04/21/2024 94 65 - 99 mg/dL Final     Sodium   Date Value Ref Range Status   04/21/2024 134 (L) 136 - 145 mmol/L Final     Potassium   Date Value Ref Range Status   04/21/2024 3.7 3.5 - 5.2 mmol/L Final     CO2   Date Value Ref Range Status   04/21/2024 26.0 22.0 - 29.0 mmol/L Final     Chloride   Date Value Ref Range Status   04/21/2024 98 98 - 107 mmol/L Final     Anion Gap   Date Value Ref Range Status   04/21/2024 10.0 5.0 - 15.0 mmol/L Final     Creatinine   Date Value Ref Range Status   04/21/2024 0.61 (L) 0.76 - 1.27 mg/dL Final     BUN   Date Value Ref Range Status   04/21/2024 17 6 - 20 mg/dL Final     BUN/Creatinine Ratio   Date Value Ref Range Status   04/21/2024 27.9 (H) 7.0 - 25.0 Final     Calcium   Date Value Ref Range Status   04/21/2024 8.8 8.6 - 10.5 mg/dL Final       PT/INR: 22.9 / 1.94    Rhythm: Normal sinus rhythm    CXR: Normal postoperative changes.     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 Patient is doing fairly well, except for pulling up with arms. On warfarin with daily monitoring of PT/INR, metoprolol 12.5 mg BID.     Dyslipidemia - on statin    GERD - on PPI    Hx seizures - on Klonopin    Hx ETOH abuse - no evidence of withdrawal    Daily cigarette smoker      Change warfarin to 3 mg nightly. Recheck PT/INR in AM. Rmove MCT. Discontinue flexeril. Order bowel regimen - Dulcolax suppository, milk of molasses enema if no results from suppository.     Case  management consult as patient's wife stated that he can not come stay with her.      Further orders/plans per Dr. Yunier De Santiago.       Liliana Aguilar, APRN

## 2024-04-21 NOTE — NURSING NOTE
"Had a long discussion with patient. He stated that he last used illegal narcotics approx. two weeks prior to surgery, specifying that it was \"Ice\" (meth) that he used. Used it twice but \"didn't like it.\" Previous to that he only was using alcohol. When asked why he thought he was so drowsy, patient stated, \"Probably because of not getting enough oxygen.\" O2 sat has been 94-96% this morning, and he has had 2L on all morning via nasal cannula. We discussed his \"neck pain\" and identified that it is actually his throat that is sore since being intubated. Further education given on deep breathing and coughing as well as using the IS. With more effort given, he was able to pull 1100 on his IS. With prompting to cough stronger, his cough is productive. Additional education given on sternal precautions. Chair alarm remains on. He has continued to stand without assistance (though stating he didn't know why he wanted to stand up), setting off the alarm. Non-pharmacological techniques used to help with pain control, such as applying heated blanket to shoulders/back and utilization of additional pillows. Call light, urinal, and personal items placed within reach and patient encouraged to call out for any needs in between nurse rounding. No visitors have been present during day shift yesterday or today.      "

## 2024-04-21 NOTE — PLAN OF CARE
Goal Outcome Evaluation:   Instructed patient on safe bed transfers. Patient stands and sits independently. Ambulated 600' with Cga to SBA. Continue to work on breathing technique, and encourage spirometer. Sats on 2 lpm 95 - 90 - 93%. Reviewed sternal restrictions.

## 2024-04-21 NOTE — PLAN OF CARE
Goal Outcome Evaluation:      Patient is independent in sit to stand to sit. Ambulated 600' with SBA. Continues to be a mouth breather but breathing technique has improved. Patient is a little better in not pushing with arms as much. Patient has been working on incentive spirometer. Walked on 2 lpm and SATs from 97 -92 -96%. Placed on 1 lpm when returned to chair. Patient will continue to benefit from safety education and increase activity.

## 2024-04-21 NOTE — DISCHARGE PLACEMENT REQUEST
"Raj Castro (48 y.o. Male)       Date of Birth   1975    Social Security Number       Address   [address] [facility] [address]    Home Phone   [phone]    MRN   [id number]       Buddhism   Other    Marital Status                               Admission Date   4/17/24    Admission Type   Elective    Admitting Provider   Sandeep Duran MD    Attending Provider   Sandeep Duran MD    Department, Room/Bed   [facility] 4B, 435/1       Discharge Date       Discharge Disposition       Discharge Destination                                 Attending Provider: Sandeep Duran MD    Allergies: No Known Allergies    Isolation: None   Infection: None   Code Status: CPR    Ht: 180.3 cm (71\")   Wt: 90.8 kg (200 lb 3.2 oz)    Admission Cmt: None   Principal Problem: Aneurysm of the ascending aorta, without rupture [I71.21]                   Active Insurance as of 4/17/2024       Primary Coverage       Payor Plan Insurance Group Employer/Plan Group    [place] [id number]       Payor Plan Address Payor Plan Phone Number Payor Plan Fax Number Effective Dates    [employer] [address] [address] [phone]  3/1/2024 - None Entered    [facility] [id number]         Subscriber Name Subscriber Birth Date Member ID       RAJ CASTRO 1975 [id number]                     Emergency Contacts        (Rel.) Home Phone Work Phone Mobile Phone    Lubna Castro (Spouse) [phone] -- [phone]            Wants to list with Arthur Law.   "

## 2024-04-21 NOTE — PLAN OF CARE
Goal Outcome Evaluation:    Problem: Adult Inpatient Plan of Care  Goal: Plan of Care Review  Outcome: Ongoing, Progressing  Flowsheets (Taken 4/21/2024 1642)  Progress: improving  Plan of Care Reviewed With: patient  Outcome Evaluation: Pt has been more awake this shift and has remained awake all day. Less lethargic compared to previous shifts. Pt has some issues with short term memory. Easily forgetful; such as forgetting he was going to the bath room and walking to the hallway door. Pain managed with scheduled medication along with PRN medications. Pt had large BM this shift. IS ~ 1200 with encouragement. Mid sternal incision with some drainage, but less than previous shifts. Pt is now on room air with O2 sat low 90's. NSR 89-95, up to 120 with 2.51 sec of PAT, with PVC and PAC. Plan of care ongoing.

## 2024-04-22 ENCOUNTER — APPOINTMENT (OUTPATIENT)
Dept: CT IMAGING | Facility: HOSPITAL | Age: 49
DRG: 221 | End: 2024-04-22
Payer: MEDICAID

## 2024-04-22 ENCOUNTER — APPOINTMENT (OUTPATIENT)
Dept: GENERAL RADIOLOGY | Facility: HOSPITAL | Age: 49
DRG: 221 | End: 2024-04-22
Payer: MEDICAID

## 2024-04-22 LAB
ANION GAP SERPL CALCULATED.3IONS-SCNC: 8 MMOL/L (ref 5–15)
BUN SERPL-MCNC: 16 MG/DL (ref 6–20)
BUN/CREAT SERPL: 25.8 (ref 7–25)
CALCIUM SPEC-SCNC: 8.6 MG/DL (ref 8.6–10.5)
CHLORIDE SERPL-SCNC: 102 MMOL/L (ref 98–107)
CO2 SERPL-SCNC: 26 MMOL/L (ref 22–29)
CREAT SERPL-MCNC: 0.62 MG/DL (ref 0.76–1.27)
DEPRECATED RDW RBC AUTO: 47.6 FL (ref 37–54)
EGFRCR SERPLBLD CKD-EPI 2021: 116.4 ML/MIN/1.73
ERYTHROCYTE [DISTWIDTH] IN BLOOD BY AUTOMATED COUNT: 14.8 % (ref 12.3–15.4)
GLUCOSE SERPL-MCNC: 102 MG/DL (ref 65–99)
HCT VFR BLD AUTO: 26.8 % (ref 37.5–51)
HGB BLD-MCNC: 8.4 G/DL (ref 13–17.7)
INR PPP: 2.18 (ref 0.91–1.09)
MCH RBC QN AUTO: 27.2 PG (ref 26.6–33)
MCHC RBC AUTO-ENTMCNC: 31.3 G/DL (ref 31.5–35.7)
MCV RBC AUTO: 86.7 FL (ref 79–97)
PLATELET # BLD AUTO: 164 10*3/MM3 (ref 140–450)
PMV BLD AUTO: 10.5 FL (ref 6–12)
POTASSIUM SERPL-SCNC: 4 MMOL/L (ref 3.5–5.2)
PROTHROMBIN TIME: 25.1 SECONDS (ref 11.8–14.8)
RBC # BLD AUTO: 3.09 10*6/MM3 (ref 4.14–5.8)
SODIUM SERPL-SCNC: 136 MMOL/L (ref 136–145)
WBC NRBC COR # BLD AUTO: 13.3 10*3/MM3 (ref 3.4–10.8)

## 2024-04-22 PROCEDURE — 85610 PROTHROMBIN TIME: CPT | Performed by: NURSE PRACTITIONER

## 2024-04-22 PROCEDURE — 25010000002 ENOXAPARIN PER 10 MG: Performed by: THORACIC SURGERY (CARDIOTHORACIC VASCULAR SURGERY)

## 2024-04-22 PROCEDURE — 25010000002 FUROSEMIDE PER 20 MG: Performed by: NURSE PRACTITIONER

## 2024-04-22 PROCEDURE — 97116 GAIT TRAINING THERAPY: CPT

## 2024-04-22 PROCEDURE — 71275 CT ANGIOGRAPHY CHEST: CPT

## 2024-04-22 PROCEDURE — 80048 BASIC METABOLIC PNL TOTAL CA: CPT | Performed by: SURGERY

## 2024-04-22 PROCEDURE — 25510000001 IOPAMIDOL PER 1 ML: Performed by: SURGERY

## 2024-04-22 PROCEDURE — 71045 X-RAY EXAM CHEST 1 VIEW: CPT

## 2024-04-22 PROCEDURE — 85027 COMPLETE CBC AUTOMATED: CPT | Performed by: SURGERY

## 2024-04-22 RX ORDER — IRON POLYSACCHARIDE COMPLEX 150 MG
150 CAPSULE ORAL DAILY
Status: DISCONTINUED | OUTPATIENT
Start: 2024-04-22 | End: 2024-04-23 | Stop reason: HOSPADM

## 2024-04-22 RX ADMIN — WARFARIN SODIUM 3 MG: 3 TABLET ORAL at 17:22

## 2024-04-22 RX ADMIN — POTASSIUM CHLORIDE 20 MEQ: 750 CAPSULE, EXTENDED RELEASE ORAL at 17:21

## 2024-04-22 RX ADMIN — GUAIFENESIN 1200 MG: 600 TABLET, EXTENDED RELEASE ORAL at 09:37

## 2024-04-22 RX ADMIN — METHOCARBAMOL 500 MG: 500 TABLET, FILM COATED ORAL at 05:52

## 2024-04-22 RX ADMIN — METHOCARBAMOL 500 MG: 500 TABLET, FILM COATED ORAL at 21:16

## 2024-04-22 RX ADMIN — FUROSEMIDE 20 MG: 10 INJECTION, SOLUTION INTRAMUSCULAR; INTRAVENOUS at 17:22

## 2024-04-22 RX ADMIN — METHOCARBAMOL 500 MG: 500 TABLET, FILM COATED ORAL at 13:25

## 2024-04-22 RX ADMIN — Medication 150 MG: at 09:34

## 2024-04-22 RX ADMIN — POTASSIUM CHLORIDE 20 MEQ: 750 CAPSULE, EXTENDED RELEASE ORAL at 09:32

## 2024-04-22 RX ADMIN — ACETAMINOPHEN 650 MG: 325 TABLET, FILM COATED ORAL at 12:34

## 2024-04-22 RX ADMIN — QUETIAPINE FUMARATE 100 MG: 100 TABLET ORAL at 21:16

## 2024-04-22 RX ADMIN — ACETAMINOPHEN 650 MG: 325 TABLET, FILM COATED ORAL at 23:48

## 2024-04-22 RX ADMIN — METOPROLOL TARTRATE 25 MG: 50 TABLET, FILM COATED ORAL at 21:16

## 2024-04-22 RX ADMIN — ACETAMINOPHEN 650 MG: 325 TABLET, FILM COATED ORAL at 17:21

## 2024-04-22 RX ADMIN — ASPIRIN 81 MG: 81 TABLET, COATED ORAL at 09:32

## 2024-04-22 RX ADMIN — PREGABALIN 25 MG: 25 CAPSULE ORAL at 21:16

## 2024-04-22 RX ADMIN — CLONAZEPAM 0.5 MG: 0.5 TABLET ORAL at 21:16

## 2024-04-22 RX ADMIN — PANTOPRAZOLE SODIUM 40 MG: 40 TABLET, DELAYED RELEASE ORAL at 05:52

## 2024-04-22 RX ADMIN — ACETAMINOPHEN 650 MG: 325 TABLET, FILM COATED ORAL at 05:52

## 2024-04-22 RX ADMIN — LIDOCAINE 2 PATCH: 4 PATCH TOPICAL at 09:34

## 2024-04-22 RX ADMIN — ATORVASTATIN CALCIUM 20 MG: 10 TABLET, FILM COATED ORAL at 21:16

## 2024-04-22 RX ADMIN — CLONAZEPAM 0.5 MG: 0.5 TABLET ORAL at 09:33

## 2024-04-22 RX ADMIN — IOPAMIDOL 100 ML: 755 INJECTION, SOLUTION INTRAVENOUS at 11:29

## 2024-04-22 RX ADMIN — SERTRALINE HYDROCHLORIDE 200 MG: 100 TABLET, FILM COATED ORAL at 09:32

## 2024-04-22 RX ADMIN — METOPROLOL TARTRATE 25 MG: 50 TABLET, FILM COATED ORAL at 09:32

## 2024-04-22 RX ADMIN — GUAIFENESIN 1200 MG: 600 TABLET, EXTENDED RELEASE ORAL at 21:16

## 2024-04-22 RX ADMIN — PREGABALIN 25 MG: 25 CAPSULE ORAL at 09:32

## 2024-04-22 RX ADMIN — POLYETHYLENE GLYCOL 3350 17 G: 17 POWDER, FOR SOLUTION ORAL at 09:34

## 2024-04-22 RX ADMIN — ENOXAPARIN SODIUM 40 MG: 100 INJECTION SUBCUTANEOUS at 09:32

## 2024-04-22 RX ADMIN — BISACODYL 10 MG: 5 TABLET, COATED ORAL at 09:32

## 2024-04-22 RX ADMIN — FUROSEMIDE 20 MG: 10 INJECTION, SOLUTION INTRAMUSCULAR; INTRAVENOUS at 09:32

## 2024-04-22 RX ADMIN — ARIPIPRAZOLE 5 MG: 5 TABLET ORAL at 09:32

## 2024-04-22 NOTE — DISCHARGE PLACEMENT REQUEST
"Call back: Kerry Brooks, [phone]    Raj Castro (48 y.o. Male)       Date of Birth   1975    Social Security Number       Address   [address] [facility] [address]    Home Phone   [phone]    MRN   [id number]       Hindu   Other    Marital Status                               Admission Date   4/17/24    Admission Type   Elective    Admitting Provider   Sandeep uDran MD    Attending Provider   Sandeep Duran MD    Department, Room/Bed   [facility] 4B, 435/1       Discharge Date       Discharge Disposition       Discharge Destination                                 Attending Provider: Sandeep Duran MD    Allergies: No Known Allergies    Isolation: None   Infection: None   Code Status: CPR    Ht: 180.3 cm (71\")   Wt: 90.8 kg (200 lb 3.2 oz)    Admission Cmt: None   Principal Problem: Aneurysm of the ascending aorta, without rupture [I71.21]                   Active Insurance as of 4/17/2024       Primary Coverage       Payor Plan Insurance Group Employer/Plan Group    [place] [id number]       Payor Plan Address Payor Plan Phone Number Payor Plan Fax Number Effective Dates    [employer] [address] [address] [phone]  3/1/2024 - None Entered    [facility] [id number]         Subscriber Name Subscriber Birth Date Member ID       RAJ CASTRO 1975 [id number]                     Emergency Contacts        (Rel.) Home Phone Work Phone Mobile Phone    Lubna Castro (Spouse) [phone] -- [phone]              Insurance Information                  [place] Phone: [phone]    Subscriber: Raj Castro Subscriber#: [id number]    Group#: [id number] Precert#: --             History & Physical        Duran, Sandeep HECTOR MD at 04/17/24 0628          Since H&P in clinic has undergone full mouth extraction.  Coronary angiography without significant obstructive disease.  Long discussions " with patient regarding valve type, he wants to proceed with mechanical valve replacement as part of the aortic root replacement.  He understands the need for life long anticoagulation with coumadin.  Discussed overall risks and benefits of surgery again with him, he understands and agrees to proceed.    Sandeep Duran M.D.  Cardiothoracic Surgeon    Cardiothoracic Surgery Consultation     Referring Physician: Dr. Kyle Villagran     Primary Care Physician: Dr. Jhon Pittman          Chief Complaint   Patient presents with    Aortic Aneurysm       New patient from Dr Villagran               Subjective  History of Present Illness     Nic Castro is a 48-year-old male who presents for evaluation of an aortic aneurysm. He is accompanied by his wife.     The patient reports that about 4 years ago, he was having some dizziness and had a strange sharp pain in his stomach. The abdominal pain did not last very long, but he still felt dizzy, so he went to the hospital. He was discovered to have an aortic aneurysm that was 3.5 cm, however they would not operate on them until they were 5.5 cm. He had another CT scan between then and the one he just had, and it showed the aortic aneurysm in his chest was changing.      He has received conflicting information about the size of his aortic aneurysm.      He denies chest pain but does sometimes feels tight in his chest.     He denies any heart problems before. He denies any stroke or mini stroke that he is aware of. He is otherwise healthy.     He denies any surgery on his heart, lungs, or chest.      He does not go to the dentist regularly. He has 6 teeth that could be of concern. He does not have dental insurance.     He has no known family history of aortic aneurysms.         He smokes about half a pack of cigarettes a day. He has been trying to quit lately. He denies any illegal drug use.     He denies any family history of aortic aneurysm.        Review of Systems      A  complete review of systems was performed, is negative except stated above.     Medical History        Past Medical History:   Diagnosis Date    Anxiety      Pancreatitis      Substance abuse           Surgical History         Past Surgical History:   Procedure Laterality Date    ANKLE LIGAMENT RECONSTRUCTION Left 2015         History reviewed. No pertinent family history.  Social History   Social History            Tobacco Use    Smoking status: Every Day       Packs/day: .5       Types: Cigarettes       Start date: 2003    Smokeless tobacco: Never   Substance Use Topics    Alcohol use: Not Currently    Drug use: Never         Current Medications          Current Outpatient Medications   Medication Sig Dispense Refill    ARIPiprazole (ABILIFY) 5 MG tablet Take 1 tablet by mouth Daily.        aspirin 81 MG EC tablet Take 1 tablet by mouth Daily.        atorvastatin (Lipitor) 10 MG tablet Take 1 tablet by mouth Daily. 90 tablet 0    busPIRone (BUSPAR) 15 MG tablet Take 1 tablet by mouth 3 (Three) Times a Day.        clonazePAM (KlonoPIN) 0.5 MG tablet Take 1 tablet by mouth Every 12 (Twelve) Hours.        diazePAM (VALIUM) 5 MG tablet Take 1 tablet by mouth Every 8 (Eight) Hours As Needed. for anxiety        folic acid (FOLVITE) 1 MG tablet          hydrOXYzine (ATARAX) 25 MG tablet Take 1 tablet by mouth 3 (Three) Times a Day As Needed.        naltrexone (DEPADE) 50 MG tablet Take 1 tablet by mouth Daily.        pantoprazole (PROTONIX) 40 MG EC tablet Take 1 tablet by mouth Daily.        PARoxetine (PAXIL) 40 MG tablet Take 1 tablet by mouth Daily.        prazosin (MINIPRESS) 2 MG capsule Take 1 capsule by mouth Every Night.        QUEtiapine (SEROquel) 100 MG tablet Take 1 tablet by mouth every night at bedtime.        sertraline (ZOLOFT) 100 MG tablet Take 2 tablets by mouth Daily.        SM Nicotine 14 MG/24HR patch Place 1 patch on the skin as directed by provider Daily.        traZODone (DESYREL) 100 MG tablet  "Take 1 tablet by mouth Daily.          No current facility-administered medications for this visit.         Allergies:  Patient has no known allergies.           Objective  Vital Signs  Visit Vitals  /82 (BP Location: Right arm, Patient Position: Sitting, Cuff Size: Adult)   Pulse 102   Ht 180.3 cm (71\")   Wt 86.6 kg (191 lb)   SpO2 98%   BMI 26.64 kg/m²            Physical Exam  Vitals and nursing note reviewed.   Constitutional:       Appearance: He is well-developed.   HENT:      Head: Normocephalic.   Neck:      Thyroid: No thyroid mass.      Vascular: No carotid bruit or JVD.      Trachea: Trachea and phonation normal.   Cardiovascular:      Rate and Rhythm: Normal rate and regular rhythm.      Pulses:           Radial pulses are 2+ on the right side and 2+ on the left side.        Posterior tibial pulses are 2+ on the right side and 2+ on the left side.      Heart sounds: Normal heart sounds. No murmur heard.     No friction rub. No gallop.   Pulmonary:      Effort: Pulmonary effort is normal. No respiratory distress.      Breath sounds: Normal breath sounds. No wheezing or rales.   Musculoskeletal:         General: No swelling. Normal range of motion.      Cervical back: Neck supple.   Skin:     General: Skin is warm and dry.      Capillary Refill: Capillary refill takes less than 2 seconds.      Findings: No rash.   Neurological:      Mental Status: He is alert and oriented to person, place, and time.   Psychiatric:         Speech: Speech normal.         Behavior: Behavior normal.         Thought Content: Thought content normal.         Judgment: Judgment normal.            Results Review:         WBC   Date Value Ref Range Status   12/21/2023 8.92 3.40 - 10.80 10*3/mm3 Final            RBC   Date Value Ref Range Status   12/21/2023 5.35 4.14 - 5.80 10*6/mm3 Final            Hemoglobin   Date Value Ref Range Status   12/21/2023 14.6 13.0 - 17.7 g/dL Final            Hematocrit   Date Value Ref Range " Status   12/21/2023 45.5 37.5 - 51.0 % Final            MCV   Date Value Ref Range Status   12/21/2023 85.0 79.0 - 97.0 fL Final            MCH   Date Value Ref Range Status   12/21/2023 27.3 26.6 - 33.0 pg Final            MCHC   Date Value Ref Range Status   12/21/2023 32.1 31.5 - 35.7 g/dL Final            RDW   Date Value Ref Range Status   12/21/2023 13.2 12.3 - 15.4 % Final            RDW-SD   Date Value Ref Range Status   12/21/2023 40.3 37.0 - 54.0 fl Final            MPV   Date Value Ref Range Status   12/21/2023 9.5 6.0 - 12.0 fL Final            Platelets   Date Value Ref Range Status   12/21/2023 211 140 - 450 10*3/mm3 Final            Neutrophil %   Date Value Ref Range Status   12/21/2023 58.4 42.7 - 76.0 % Final            Lymphocyte %   Date Value Ref Range Status   12/21/2023 26.3 19.6 - 45.3 % Final            Monocyte %   Date Value Ref Range Status   12/21/2023 10.2 5.0 - 12.0 % Final            Eosinophil %   Date Value Ref Range Status   12/21/2023 4.0 0.3 - 6.2 % Final            Basophil %   Date Value Ref Range Status   12/21/2023 0.8 0.0 - 1.5 % Final            Immature Grans %   Date Value Ref Range Status   12/21/2023 0.3 0.0 - 0.5 % Final            Neutrophils, Absolute   Date Value Ref Range Status   12/21/2023 5.20 1.70 - 7.00 10*3/mm3 Final            Lymphocytes, Absolute   Date Value Ref Range Status   12/21/2023 2.35 0.70 - 3.10 10*3/mm3 Final            Monocytes, Absolute   Date Value Ref Range Status   12/21/2023 0.91 (H) 0.10 - 0.90 10*3/mm3 Final            Eosinophils, Absolute   Date Value Ref Range Status   12/21/2023 0.36 0.00 - 0.40 10*3/mm3 Final            Basophils, Absolute   Date Value Ref Range Status   12/21/2023 0.07 0.00 - 0.20 10*3/mm3 Final            Immature Grans, Absolute   Date Value Ref Range Status   12/21/2023 0.03 0.00 - 0.05 10*3/mm3 Final            nRBC   Date Value Ref Range Status   12/21/2023 0.0 0.0 - 0.2 /100 WBC Final            Glucose    Date Value Ref Range Status   12/21/2023 92 65 - 99 mg/dL Final            Sodium   Date Value Ref Range Status   12/21/2023 138 136 - 145 mmol/L Final   11/06/2023 139 135 - 145 mmol/L Final              Potassium   Date Value Ref Range Status   12/21/2023 4.3 3.5 - 5.2 mmol/L Final       Comment:       Slight hemolysis detected by analyzer. Result may be falsely elevated.   11/06/2023 3.7 3.5 - 5.0 mmol/L Final            CO2   Date Value Ref Range Status   12/21/2023 26.0 22.0 - 29.0 mmol/L Final            Total CO2   Date Value Ref Range Status   11/06/2023 29 21 - 32 mmol/L Final            Chloride   Date Value Ref Range Status   12/21/2023 102 98 - 107 mmol/L Final   11/06/2023 102 98 - 107 mmol/L Final            Anion Gap   Date Value Ref Range Status   12/21/2023 10.0 5.0 - 15.0 mmol/L Final   11/06/2023 8 6 - 16 mmol/L Final            Creatinine   Date Value Ref Range Status   12/21/2023 0.81 0.76 - 1.27 mg/dL Final   11/06/2023 0.92 0.60 - 1.30 mg/dL Final            BUN   Date Value Ref Range Status   12/21/2023 9 6 - 20 mg/dL Final   11/06/2023 10 7 - 18 mg/dL Final            BUN/Creatinine Ratio   Date Value Ref Range Status   12/21/2023 11.1 7.0 - 25.0 Final   11/06/2023 10.9 11.7 - 13.9 Final              Calcium   Date Value Ref Range Status   12/21/2023 9.4 8.6 - 10.5 mg/dL Final   11/06/2023 9.0 8.5 - 10.1 mg/dL Final       Comment:       Calcium measurements are adversely affected by the use of Omniscan during MRI. Analysis of calcium is not recommended for 12 to 24 hours after the use of the contrast agent.             eGFR Non  Am   Date Value Ref Range Status   11/06/2023 >60.0 >=60.0 mL/min/1.73m2 Final            Alkaline Phosphatase   Date Value Ref Range Status   12/21/2023 83 39 - 117 U/L Final   11/06/2023 62 50 - 136 U/L Final            Total Protein   Date Value Ref Range Status   12/21/2023 7.3 6.0 - 8.5 g/dL Final   11/06/2023 7.2 6.4 - 8.2 g/dL Final            ALT  (SGPT)   Date Value Ref Range Status   12/21/2023 61 (H) 1 - 41 U/L Final   11/06/2023 62 16 - 62 U/L Final            AST (SGOT)   Date Value Ref Range Status   12/21/2023 47 (H) 1 - 40 U/L Final   11/06/2023 37 (H) 7 - 34 U/L Final              Total Bilirubin   Date Value Ref Range Status   12/21/2023 0.5 0.0 - 1.2 mg/dL Final   11/06/2023 0.3 0.0 - 1.0 mg/dL Final       Comment:       Use of this assay is not recommended for patients undergoing treatment with eltrombopag due to the potential for falsely elevated results.            Albumin   Date Value Ref Range Status   12/21/2023 4.2 3.5 - 5.2 g/dL Final   11/06/2023 3.6 3.4 - 5.0 g/dL Final            Globulin   Date Value Ref Range Status   12/21/2023 3.1 gm/dL Final                     I reviewed the patient's clinical results and discussed with patient.     CT Chest:: CT chest with contrast performed on 11/06/2023.  FINDINGS:     Lung parenchyma and central airways: Normal.   Pleura: Normal.   Heart and great vessels: Heart size is normal. Some coronary artery   calcification is noted. There is also some scattered ossification of   the pericardium. There is significant enlargement of the ascending   thoracic aorta. The diameter of the thoracic aorta at the level of the   sinuses of Valsalva and sinotubular junction is about 5.4 cm. No   evidence of dissection. There are no prior exam for comparison. The   aorta tapers up to the aortic arch where it has a more normal caliber.   Caliber is also normal in descending thoracic aorta. Small amount of   calcification in some of the great vessels and coronary arteries.   Mediastinum and dana: Small amount of calcification in the mediastinum   consistent was some old granulomatous infection.   Chest wall: Normal.   Spine and other bones: Mild degenerative thoracic spondylosis.   Lower neck: Normal.   Upper abdomen: Normal.      IMPRESSION:     1. Prominent aneurysmal enlargement of the ascending thoracic aorta.    No evidence of dissection or other acute change.   2. Atherosclerotic calcification and coronary arteries consistent with   coronary artery disease.   3. Some pericardial calcification is noted. This may result in   significant restricted diastolic dysfunction.      ECHO:     I personally reviewed images of following exams, the following is my interpretation:     CT Chest: CT chest shows severely dilated aneurysmal aortic root. I measured it to be 6.1 cm in maximum dimension extends to the level of the root. Aortic arch has a normal branching pattern and the aorta tapers to a 3.9 cm distal ascending/proximal arch. No dissection, IMH, or VANNA. Lungs appear normal.                 Assessment & Plan  Mr. Castro is a 48-year-old male who presents to me with aortic root and ascending aortic aneurysm. This measures 6.1 cm in maximum dimension. He has known about this for many years and per report back in 2019, it measured 5.6 cm. He was referred to Dr. Villagran and subsequently to me. He does not have any symptoms from this. He is a smoker. He has a history of pancreatitis, substance abuse, and anxiety, but he is sober and clean now. He has never had surgery on his heart, lungs, or chest.     I discussed with him and his wife today the natural history of aortic root aneurysms. He has not had an echocardiogram yet. I will obtain one to assess his valve anatomy and valve dysfunction, but I suspect bicuspid valve given the appearance of his aneurysm. We discussed treatment options, and I would recommend surgical replacement of his aorta given its large size at 6.1 cm. The risk of dissection is much higher than the risk of surgery at this point. We discussed preoperative, operative, postoperative expectations at length as well as the risk and benefits of surgery.      We discussed the risk of surgery including but not limited to bleeding, infection, injury to major organs or vessels, chronic heart failure, arrhythmias, need  for pacemaker, prolonged ventilation, renal failure, stroke, risk of anesthesia, risk of sternal wound or bone healing problems, reoperation, and/or death. He is going to try to get a dental appointment as soon as possible and if he is unable by next week, he will call and discuss this with us further. I will refer him to Dr. Nikolas Ramos for a cardiac catheterization given his smoking history and age. I discussed with him valve options of biologic versus mechanical and he wants to think about it. I will discuss this with him further prior to surgery.     I will complete his work-up and plan for surgery soon. Thank you for trusting me with the care of Mr. Castro. Please do not hesitate to call with questions or concerns.           Sandeep Duran MD  Cardiothoracic Surgeon    Electronically signed by Sandeep Duran MD at 24 0630          Physician Progress Notes (last 24 hours)        Mehnaz Pizano APRN at 24 0708          Patient name: Nic Castro  Patient : 1975  VISIT # [id number]  MR #[id number]    Procedure:Procedure(s):  AORTIC ROOT REPLACEMENT, ASCENDING AORTIC HEMIARCH REPLACEMENT, AORTIC VALVE REPLACEMENT WITH MECHANICAL VALVE WITH CIRCULATORY ARREST, TRANSESOPHAGEAL ECHOCARDIOGRAM  Procedure Date:2024  POD:5 Days Post-Op    Subjective     Patient is on room air lying in bed.  INR today is 2.18 and he is on Coumadin 3 mg nightly.  He is having bowel movements.  Walking 600 feet with physical therapy.  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  He tells me today that he meant that he used methamphetamine 2 weeks prior to getting sober and has not used drugs since October of last year.  Patient also tells me that he does not have a discharge plan as his wife now states that he cannot go home with him.  He does report that he has a nephew that lives in [facility] that he may be able to stay with.  Social work is also  "working on rehab placement.  He reports ongoing issues with pain control.  No documented weight today.    Telemetry: Sinus rhythm 82-99  IV drips: None      Objective     Visit Vitals  /50 (BP Location: Left arm, Patient Position: Lying)   Pulse 91   Temp 98.9 °F (37.2 °C) (Oral)   Resp 18   Ht 180.3 cm (71\")   Wt 90.8 kg (200 lb 3.2 oz)   SpO2 92%   BMI 27.92 kg/m²   Baseline weight 199 pounds    Intake/Output Summary (Last 24 hours) at 4/22/2024 0801  Last data filed at 4/22/2024 0554  Gross per 24 hour   Intake 840 ml   Output 1675 ml   Net -835 ml       Lab:     CBC:  Results from last 7 days   Lab Units 04/22/24  0448 04/21/24  0429 04/20/24  0402   WBC 10*3/mm3 13.30* 15.82* 14.81*   HEMATOCRIT % 26.8* 28.2* 26.5*   PLATELETS 10*3/mm3 164 146 116*          BMP:  Results from last 7 days   Lab Units 04/22/24  0448 04/21/24  1616 04/21/24  0429 04/20/24  0402   SODIUM mmol/L 136  --  134* 138   POTASSIUM mmol/L 4.0 4.2 3.7 4.0   CHLORIDE mmol/L 102  --  98 103   CO2 mmol/L 26.0  --  26.0 28.0   GLUCOSE mg/dL 102*  --  94 106*   BUN mg/dL 16  --  17 17   CREATININE mg/dL 0.62*  --  0.61* 0.57*          COAG:  Results from last 7 days   Lab Units 04/22/24  0448 04/17/24  1336 04/17/24  1228   INR  2.18*   < > 1.50*   APTT seconds  --   --  42.1*    < > = values in this interval not displayed.       IMAGES:       Imaging Results (Last 24 Hours)       Procedure Component Value Units Date/Time    XR Chest 1 View [[id number]] Collected: 04/22/24 0710     Updated: 04/22/24 0714    Narrative:      EXAMINATION: XR CHEST 1 VW-  4/22/2024 7:10 AM 1 view     HISTORY: Post-Op Heart Surgery     COMPARISON: 4/21/2024     TECHNIQUE: A single frontal view of the chest was obtained.     FINDINGS:  Airspace opacities in the upper lobes are again seen and are unchanged  since the most recent comparison study. Edema versus multifocal  pneumonia are the primary considerations. No change in the  cardiomediastinal silhouette or " pulmonary vasculature. Median sternotomy  wires are again noted. Chest tube and mediastinal drains have been  removed. No pneumothorax.       Impression:         1.  Interval removal of chest tube and mediastinal drains.     2.  Persistent bilateral airspace opacities in the upper lobes, likely  edema. Multifocal pneumonia is possible but felt to be less likely.  Findings are essentially unchanged in the lungs.           This report was signed and finalized on 4/22/2024 7:11 AM by Dr. Jered Negrete MD.             CXR: Pulmonary edema improved.  No pneumothorax.    Physical Exam:  General: Alert, oriented. No apparent distress.   Cardiovascular: Regular rate and rhythm without murmur, rubs, or gallops.    Pulmonary: Diminished bilaterally without wheezing, rubs, or rales.  Chest: Sternotomy incision clean, dry, and intact.  Steri-Strips have been removed from the mid and lower portion of the sternal incision.  Mild serosanguineous drainage from the lower sternal incision.  Edges are well-approximated.  No purulent drainage or erythema.  Sternum stable. No clicks.    Abdomen: Soft, nondistended, and nontender.  Extremities: Warm, moves all extremities.  Mild lower extremity edema.  Neurologic:  Grossly intact with no focal deficits.           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  Daily PT/INR.  Goal INR is 2-3  Discussed with nursing to replace Steri-Strips.  Start iron supplement  Discontinue oxycodone 10 mg tablets  Repeat CTA chest today to obtain new baseline.  Encourage pulmonary toilet and ambulation  Routine postcardiac surgery care  Discharge planning.  Await approval/denial to [facility].  Patient to contact his nephew today to see if he will be able to stay with him at discharge if unable to go to rehab.  Discussed with patient and  nursing          ISABEL Yin  04/22/24  08:01 CDT      Electronically signed by Mehnaz Pizano APRN at 04/22/24 0804       Liliana Aguilar APRN at 04/21/24 1105              [facility] Cardiothoracic Surgery  PROGRESS NOTE     Subjective:  Patient just finished walking in halls with PT. Continues to complain of severe postoperative pain.     Objective:      Intake/Output Summary (Last 24 hours) at 4/21/2024 1105  Last data filed at 4/21/2024 0849  Gross per 24 hour   Intake 480 ml   Output 1195 ml   Net -715 ml       CT Output:   MCT to -20 cm suction with 340 ml serosanguinous drainage in the past 24 hours. No air leak.     Gtt's:  None    Wt preop: 202 lbs  Wt current:       04/21/24  0500   Weight: 90.8 kg (200 lb 3.2 oz)         PE:  Vitals:    04/21/24 0806   BP: 113/52   Pulse: 93   Resp: 18   Temp: 97.9 °F (36.6 °C)   SpO2: 96%     GENERAL: NAD, resting comfortably, normal color  CARDIOVASCULAR: Normal HT with appropriate click, RRR. No murmurs, gallops or rubs.   PULMONARY: Fairly clear bilateral breath sounds with scattered rhonchi. No wheezes or rhonchi.   ABDOMEN: Soft, non-tender with active bowel sounds.   EXTREMITIES: No clubbing or cyanosis. Mild BLE edema. Pedal pulses palpated.   INCISIONS: Sternal incision with edges well approximated, but mild separation in lower portion of incision. Noted serous drainage from distal incision. Sternum appears stable.     WBC   Date Value Ref Range Status   04/21/2024 15.82 (H) 3.40 - 10.80 10*3/mm3 Final     RBC   Date Value Ref Range Status   04/21/2024 3.28 (L) 4.14 - 5.80 10*6/mm3 Final     Hemoglobin   Date Value Ref Range Status   04/21/2024 8.9 (L) 13.0 - 17.7 g/dL Final     Hematocrit   Date Value Ref Range Status   04/21/2024 28.2 (L) 37.5 - 51.0 % Final     MCV   Date Value Ref Range Status   04/21/2024 86.0 79.0 - 97.0 fL Final     MCH   Date Value Ref Range Status   04/21/2024 27.1 26.6 - 33.0 pg Final     [facility]   Date  Value Ref Range Status   04/21/2024 31.6 31.5 - 35.7 g/dL Final     RDW   Date Value Ref Range Status   04/21/2024 15.0 12.3 - 15.4 % Final     RDW-SD   Date Value Ref Range Status   04/21/2024 47.5 37.0 - 54.0 fl Final     MPV   Date Value Ref Range Status   04/21/2024 11.1 6.0 - 12.0 fL Final     Platelets   Date Value Ref Range Status   04/21/2024 146 140 - 450 10*3/mm3 Final     Glucose   Date Value Ref Range Status   04/21/2024 94 65 - 99 mg/dL Final     Sodium   Date Value Ref Range Status   04/21/2024 134 (L) 136 - 145 mmol/L Final     Potassium   Date Value Ref Range Status   04/21/2024 3.7 3.5 - 5.2 mmol/L Final     CO2   Date Value Ref Range Status   04/21/2024 26.0 22.0 - 29.0 mmol/L Final     Chloride   Date Value Ref Range Status   04/21/2024 98 98 - 107 mmol/L Final     Anion Gap   Date Value Ref Range Status   04/21/2024 10.0 5.0 - 15.0 mmol/L Final     Creatinine   Date Value Ref Range Status   04/21/2024 0.61 (L) 0.76 - 1.27 mg/dL Final     BUN   Date Value Ref Range Status   04/21/2024 17 6 - 20 mg/dL Final     BUN/Creatinine Ratio   Date Value Ref Range Status   04/21/2024 27.9 (H) 7.0 - 25.0 Final     Calcium   Date Value Ref Range Status   04/21/2024 8.8 8.6 - 10.5 mg/dL Final       PT/INR: 22.9 / 1.94    Rhythm: Normal sinus rhythm    CXR: Normal postoperative changes.     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 Patient is doing fairly well, except for pulling up with arms. On warfarin with daily monitoring of PT/INR, metoprolol 12.5 mg BID.     Dyslipidemia - on statin    GERD - on PPI    Hx seizures - on Klonopin    Hx ETOH abuse - no evidence of withdrawal    Daily cigarette smoker      Change warfarin to 3 mg  nightly. Recheck PT/INR in AM. Rmove MCT. Discontinue flexeril. Order bowel regimen - Dulcolax suppository, milk of molasses enema if no results from suppository.     Case management consult as patient's wife stated that he can not come stay with her.      Further orders/plans per Dr. Yunier De Santiago.       ISABEL Watson        Electronically signed by Liliana Aguilar APRN at 24 1110          Physical Therapy Notes (most recent note)        Rissa Alva PTA at 24 1031  Version 1 of 1         Acute Care - Physical Therapy Treatment Note  [facility]     Patient Name: Nic Castro  : 1975  MRN: [id number]  Today's Date: 2024      Visit Dx:     ICD-10-CM ICD-9-CM   1. Impaired mobility [Z74.09]  Z74.09 799.89   2. Aneurysm of the ascending aorta, without rupture  I71.21 441.2     Patient Active Problem List   Diagnosis    Aneurysm of the ascending aorta, without rupture    Chest pain    Dyslipidemia    Tobacco use    Overweight with body mass index (BMI) of 28 to 28.9 in adult    Aortic root aneurysm     Past Medical History:   Diagnosis Date    AAA (abdominal aortic aneurysm)     Anxiety     Arthritis     GERD (gastroesophageal reflux disease)     Hepatitis C     Hyperlipidemia     Hypertension     Pancreatitis     Seizure     from head injury    Substance abuse      Past Surgical History:   Procedure Laterality Date    ANKLE LIGAMENT RECONSTRUCTION Left     ASCENDING ARCH/HEMIARCH REPLACEMENT N/A 2024    Procedure: AORTIC ROOT REPLACEMENT, ASCENDING AORTIC HEMIARCH REPLACEMENT, AORTIC VALVE REPLACEMENT WITH MECHANICAL VALVE WITH CIRCULATORY ARREST, TRANSESOPHAGEAL ECHOCARDIOGRAM;  Surgeon: Sandeep Duran MD;  Location: [facility] OR;  Service: Cardiothoracic;  Laterality: N/A;    CARDIAC CATHETERIZATION N/A 2023    Procedure: Left Heart Cath;  Surgeon: Nikolas Ramos MD;  Location:  [facility] CATH INV[facility];  Service: Cardiology;  Laterality: N/A;     TOOTH EXTRACTION      Full mouth extraction     PT Assessment (Last 12 Hours)       PT Evaluation and Treatment       Row Name 04/22/24 0905          Physical Therapy Time and Intention    Subjective Information complains of;pain  -     Document Type therapy note (daily note)  -     Mode of Treatment physical therapy  -     Comment pt will be reminded of sternal precautions and the very next moment use his arms to stand or sit down. Pt. voices understanding, but does not demonstrate that he understands.  -       Row Name 04/22/24 0905          General Information    Existing Precautions/Restrictions fall;sternal  -     Limitations/Impairments safety/cognitive  -       Row Name 04/22/24 0905          Pain    Pretreatment Pain Rating 6/10  -     Posttreatment Pain Rating 8/10  -     Pain Location incisional  -     Pain Location - chest  -     Pre/Posttreatment Pain Comment 8/10 with coughing  -     Pain Intervention(s) Repositioned;Ambulation/increased activity  -       Row Name 04/22/24 0905          Transfers    Comment, (Transfers) practiced sit to stand x 5 cues for sternal precautions  -       Row Name 04/22/24 0905          Sit-Stand Transfer    Sit-Stand [place] (Transfers) independent  -       Row Name 04/22/24 0905          Stand-Sit Transfer    Stand-Sit [place] (Transfers) independent  -       Row Name 04/22/24 0905          Gait/Stairs (Locomotion)    [place] Level (Gait) supervision  -     Distance in Feet (Gait) 600  1 standing rest  -     Deviations/Abnormal Patterns (Gait) patricia decreased  -       Row Name 04/22/24 0905          Motor Skills    Comments, Therapeutic Exercise cardiac warm ups x 15  -       Row Name             Wound 04/17/24 0809 sternal Incision    Wound - [facility] Group Placement Date: 04/17/24  [facility] Placement Time: 0809  - Present on Original Admission: N  -LH Location: sternal  - Primary Wound Type: Incision  -    Retired  Wound - [facility] Group Placement Date: 04/17/24  -LH Placement Time: 0809  -LH Present on Original Admission: N  -LH Location: sternal  -LH Primary Wound Type: Incision  -LH    Retired Wound - [facility] Group Date first assessed: 04/17/24  -LH Time first assessed: 0809  -LH Present on Original Admission: N  -LH Location: sternal  -LH Primary Wound Type: Incision  -LH      Row Name             Wound 04/21/24 1130 Left Abrasion    Wound - [facility] Group Placement Date: 04/21/24  -AG Placement Time: 1130  -AG Present on Original Admission: N  -AG Side: Left  -AG Primary Wound Type: Abrasion  -AG    Retired Wound - [facility] Group Placement Date: 04/21/24  -AG Placement Time: 1130  -AG Present on Original Admission: N  -AG Side: Left  -AG Primary Wound Type: Abrasion  -AG    Retired Wound - [facility] Group Date first assessed: 04/21/24  -AG Time first assessed: 1130  -AG Present on Original Admission: N  -AG Side: Left  -AG Primary Wound Type: Abrasion  -AG      Row Name 04/22/24 0905          Plan of Care Review    Plan of Care Reviewed With patient  -MF     Progress no change  -     Outcome Evaluation Pt. agreeable to therapy. He rates his pain 6-7/10 all over. He continues to over use his arms for transfers and shifting his weight in chair. Despite education and reminders, he continues to do use this arms. When  pt does follow this instructions, he is Independent with transfers. He walked 600' with 1 standing rest-with Supervision. O2 sat was 91-93% while on RA. Educated pt on the benefits of walking and activity following this surgery.  -       Row Name 04/22/24 0905          Vital Signs    Intra SpO2 (%) 91  -MF     O2 Delivery Intra Treatment room air  -MF     Post SpO2 (%) 93  -MF     O2 Delivery Post Treatment room air  -MF       Row Name 04/22/24 0905          Positioning and Restraints    Pre-Treatment Position sitting in chair/recliner  -MF     Post Treatment Position chair  -MF     In Chair  reclined;call light within reach;encouraged to call for assist;exit alarm on  -               User Key  (r) = Recorded By, (t) = Taken By, (c) = Cosigned By      Initials Name Provider Type     Sofi Reid, RN Registered Nurse    Rissa Colon, DOUG Physical Therapist Assistant    Marge Tinajero RN Registered Nurse                    Physical Therapy Education       Title: PT OT SLP Therapies (In Progress)       Topic: Physical Therapy (In Progress)       Point: Mobility training (Done)       Learning Progress Summary             Patient Acceptance, E,D, DU,VU by ALFREDO at 4/21/2024 1139    Comment: bed transfers    Acceptance, E, VU by  at 4/18/2024 0853    Comment: safe mobility, fall risk, sternal precautions                         Point: Home exercise program (Done)       Learning Progress Summary             Patient Acceptance, E, VU by  at 4/18/2024 0853    Comment: safe mobility, fall risk, sternal precautions                         Point: Body mechanics (Done)       Learning Progress Summary             Patient Acceptance, E, VU by  at 4/18/2024 0853    Comment: safe mobility, fall risk, sternal precautions                         Point: Precautions (In Progress)       Learning Progress Summary             Patient Acceptance, E,D, NR by ALFREDO at 4/20/2024 1142    Comment: sternal restrictions    Acceptance, E, VU by  at 4/18/2024 0853    Comment: safe mobility, fall risk, sternal precautions                                         User Key       Initials Effective Dates Name Provider Type [facility] 02/03/23 -  Ellen Huggins PTA Physical Therapist Assistant PT     02/22/24 -  Sandeep Person, CARLITA Student PT Student PT                  PT Recommendation and Plan     Plan of Care Reviewed With: patient  Progress: no change  Outcome Evaluation: Pt. agreeable to therapy. He rates his pain 6-7/10 all over. He continues to over use his arms for transfers and shifting his weight in  chair. Despite education and reminders, he continues to do use this arms. When  pt does follow this instructions, he is Independent with transfers. He walked 600' with 1 standing rest-with Supervision. O2 sat was 91-93% while on RA. Educated pt on the benefits of walking and activity following this surgery.   Outcome Measures       Row Name 04/22/24 0905 04/21/24 1100 04/20/24 1100       How much help from another person do you currently need...    Turning from your back to your side while in flat bed without using bedrails? 4  -MF 4  -ALFREDO 4  -ALFREDO    Moving from lying on back to sitting on the side of a flat bed without bedrails? 3  -MF 4  -ALFREDO 3  -ALFREDO    Moving to and from a bed to a chair (including a wheelchair)? 4  -MF 4  -ALFREDO 3  -ALFREDO    Standing up from a chair using your arms (e.g., wheelchair, bedside chair)? 4  -MF 4  -ALFREDO 3  -ALFREDO    Climbing 3-5 steps with a railing? 3  - 4  -ALFREDO 3  -ALFREDO    To walk in hospital room? 4  - 4  -ALFREDO 3  -ALFREDO    AM-PAC 6 Clicks Score (PT) 22  - 24  -ALFREDO 19  -ALFREDO    Highest Level of Mobility Goal 7 --> Walk 25 feet or more  - 8 --> Walked 250 feet or more  -ALFREDO 6 --> Walk 10 steps or more  -ALFREDO       Functional Assessment    Outcome Measure Options AM-PAC 6 Clicks Basic Mobility (PT)  - -- --              User Key  (r) = Recorded By, (t) = Taken By, (c) = Cosigned By      Initials Name Provider Type    Ellen Leslie, DOUG Physical Therapist Assistant    Rissa Colon, DOUG Physical Therapist Assistant                     Time Calculation:    PT Charges       Row Name 04/22/24 1031             Time Calculation    Start Time 0905  -      Stop Time 0930  -      Time Calculation (min) 25 min  -      PT Received On 04/22/24  -         Time Calculation- PT    Total Timed Code Minutes- PT 25 minute(s)  -         Timed Charges    25330 - Gait Training Minutes  25  -MF         Total Minutes    Timed Charges Total Minutes 25  -MF       Total Minutes 25  -                 User Key  (r) = Recorded By, (t) = Taken By, (c) = Cosigned By      Initials Name Provider Type     Rissa Alva PTA Physical Therapist Assistant                  Therapy Charges for Today       Code Description Service Date Service Provider Modifiers Qty    [id number] HC GAIT TRAINING EA 15 MIN 4/22/2024 Rissa Alva PTA GP 2            PT G-Codes  Outcome Measure Options: AM-PAC 6 Clicks Basic Mobility (PT)  AM-PAC 6 Clicks Score (PT): 22    Rissa Alva PTA  4/22/2024      Electronically signed by Rissa Alva PTA at 04/22/24 1031

## 2024-04-22 NOTE — PAYOR COMM NOTE
"4/22/24 [facility] [phone]  [phone]      FAXING UPDATE CLINICAL FOR 4/21/24 THRU 4/22/24 CONT STAY.              Raj Castro (48 y.o. Male)       Date of Birth   1975    Social Security Number       Address   [address] [facility] [address]    Home Phone   [phone]    MRN   [id number]       Confucianism   Other    Marital Status                               Admission Date   4/17/24    Admission Type   Elective    Admitting Provider   Sandeep Duran MD    Attending Provider   Sandeep Duran MD    Department, Room/Bed   [address], 435/1       Discharge Date       Discharge Disposition       Discharge Destination                                 Attending Provider: Sandeep Duran MD    Allergies: No Known Allergies    Isolation: None   Infection: None   Code Status: CPR    Ht: 180.3 cm (71\")   Wt: 90.8 kg (200 lb 3.2 oz)    Admission Cmt: None   Principal Problem: Aneurysm of the ascending aorta, without rupture [I71.21]                   Active Insurance as of 4/17/2024       Primary Coverage       Payor Plan Insurance Group Employer/Plan Group    [place] [id number]       Payor Plan Address Payor Plan Phone Number Payor Plan Fax Number Effective Dates    [employer] [address] [address] [phone]  3/1/2024 - None Entered    [facility] [id number]         Subscriber Name Subscriber Birth Date Member ID       RAJ CASTRO 1975 [id number]                     Emergency Contacts        (Rel.) Home Phone Work Phone Mobile Phone    Lubna Castro (Spouse) [phone] -- [phone]             [facility] Encounter Date/Time: 4/17/2024 0437   Hospital Account: [id number]    MRN: [id number]   Patient:  Raj Castro   Contact Serial #: [id number]   SSN:          ENCOUNTER             Patient Class: Inpatient   Unit: [address] Service: Cardiothoracic " Surgery     Bed: 435/1   Admitting Provider: Sandeep Duran MD   Referring Physician: Sandeep Duran   Attending Provider: Sandeep Duran MD   Adm Diagnosis: Aneurysm of the ascendin*               PATIENT             Name: Raj Castro : 1975 (48 yrs)   Address: [address] Sex: Male   City: [facility] [address]   County: [facility]   Marital Status:  Ethnicity: NOT                                                                         Race: WHITE   Primary Care Provider: Provider, No Known Patients Phone: Home Phone: [phone]     Mobile Phone: [phone]     EMERGENCY CONTACT   Contact Name Legal Guardian? Relationship to Patient Home Phone Work Phone Mobile Phone   1. PownalLubna montelongo  2. *No Contact Specified* No    Spouse    [phone]      GUARANTOR             Guarantor: Raj Castro     : 1975   Address: [address] [place]: [place] [address]     Relation to Patient: Self       Home Phone: [phone]   Guarantor ID: [id number]       Work Phone:     GUARANTOR EMPLOYER   Employer:           Status: DISABLED   COVERAGE          PRIMARY INSURANCE   [place] Plan[place]   Group Number: [id number] Insurance Type: INDEMNITY   Subscriber Name: RAJ CASTRO Subscriber : 1975   Subscriber ID: [id number] Coverage Address: [place] [address]   Pat. Rel. to Subscriber: Self Coverage Phone: [phone]   SECONDARY INSURANCE   Payor: N/A Plan: N/A   Group Number:   Insurance Type:     Subscriber Name:   Subscriber :     Subscriber ID:   Coverage Address:     Pat. Rel. to Subscriber:   Coverage Phone:        Contact Serial # ([id number])         2024    Chart ID ([id number])         Liliana Aguilar APRN   Nurse Practitioner  Cardiothoracic Surgery     Progress Notes     Signed     Date of Service: 24  Creation Time: 24      Signed       Expand All Collapse All         [facility] Cardiothoracic Surgery  PROGRESS NOTE      Subjective:  Patient just finished walking in halls with PT. Continues to complain of severe postoperative pain.      Objective:        Intake/Output Summary (Last 24 hours) at 4/21/2024 1105  Last data filed at 4/21/2024 0849      Gross per 24 hour   Intake 480 ml   Output 1195 ml   Net -715 ml         CT Output:   MCT to -20 cm suction with 340 ml serosanguinous drainage in the past 24 hours. No air leak.      Gtt's:  None     Wt preop: 202 lbs  Wt current:   Vitals             04/21/24  0500   Weight: 90.8 kg (200 lb 3.2 oz)               PE:      Vitals:     04/21/24 0806   BP: 113/52   Pulse: 93   Resp: 18   Temp: 97.9 °F (36.6 °C)   SpO2: 96%      GENERAL: NAD, resting comfortably, normal color  CARDIOVASCULAR: Normal HT with appropriate click, RRR. No murmurs, gallops or rubs.   PULMONARY: Fairly clear bilateral breath sounds with scattered rhonchi. No wheezes or rhonchi.   ABDOMEN: Soft, non-tender with active bowel sounds.   EXTREMITIES: No clubbing or cyanosis. Mild BLE edema. Pedal pulses palpated.   INCISIONS: Sternal incision with edges well approximated, but mild separation in lower portion of incision. Noted serous drainage from distal incision. Sternum appears stable.            WBC   Date Value Ref Range Status   04/21/2024 15.82 (H) 3.40 - 10.80 10*3/mm3 Final            RBC   Date Value Ref Range Status   04/21/2024 3.28 (L) 4.14 - 5.80 10*6/mm3 Final            Hemoglobin   Date Value Ref Range Status   04/21/2024 8.9 (L) 13.0 - 17.7 g/dL Final            Hematocrit   Date Value Ref Range Status   04/21/2024 28.2 (L) 37.5 - 51.0 % Final            MCV   Date Value Ref Range Status   04/21/2024 86.0 79.0 - 97.0 fL Final            MCH   Date Value Ref Range Status   04/21/2024 27.1 26.6 - 33.0 pg Final            MCHC   Date Value Ref Range Status   04/21/2024 31.6 31.5 - 35.7  g/dL Final            RDW   Date Value Ref Range Status   04/21/2024 15.0 12.3 - 15.4 % Final            RDW-SD   Date Value Ref Range Status   04/21/2024 47.5 37.0 - 54.0 fl Final            MPV   Date Value Ref Range Status   04/21/2024 11.1 6.0 - 12.0 fL Final            Platelets   Date Value Ref Range Status   04/21/2024 146 140 - 450 10*3/mm3 Final            Glucose   Date Value Ref Range Status   04/21/2024 94 65 - 99 mg/dL Final            Sodium   Date Value Ref Range Status   04/21/2024 134 (L) 136 - 145 mmol/L Final            Potassium   Date Value Ref Range Status   04/21/2024 3.7 3.5 - 5.2 mmol/L Final            CO2   Date Value Ref Range Status   04/21/2024 26.0 22.0 - 29.0 mmol/L Final            Chloride   Date Value Ref Range Status   04/21/2024 98 98 - 107 mmol/L Final            Anion Gap   Date Value Ref Range Status   04/21/2024 10.0 5.0 - 15.0 mmol/L Final            Creatinine   Date Value Ref Range Status   04/21/2024 0.61 (L) 0.76 - 1.27 mg/dL Final            BUN   Date Value Ref Range Status   04/21/2024 17 6 - 20 mg/dL Final            BUN/Creatinine Ratio   Date Value Ref Range Status   04/21/2024 27.9 (H) 7.0 - 25.0 Final            Calcium   Date Value Ref Range Status   04/21/2024 8.8 8.6 - 10.5 mg/dL Final         PT/INR: 22.9 / 1.94     Rhythm: Normal sinus rhythm     CXR: Normal postoperative changes.         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 Patient is doing fairly well, except for pulling up with arms. On warfarin with daily monitoring of PT/INR, metoprolol 12.5 mg BID.     Dyslipidemia - on statin    GERD - on PPI    Hx seizures - on Klonopin    Hx ETOH abuse - no evidence of withdrawal     Daily cigarette smoker        Change warfarin to 3 mg nightly. Recheck PT/INR in AM. Rmove MCT. Discontinue flexeril. Order bowel regimen - Dulcolax suppository, milk of molasses enema if no results from suppository.      Case management consult as patient's wife stated that he can not come stay with her.       Further orders/plans per Dr. Yunier De Santiago.         ISABEL Watson Samantha L, APRN   Nurse Practitioner  Cardiothoracic Surgery     Progress Notes      Cosign Needed     Date of Service: 04/20/24 1226  Creation Time: 04/20/24 1226     Cosign Needed       Expand All Collapse All         [facility] Cardiothoracic Surgery  PROGRESS NOTE      Subjective:  Patient just finished walking in halls with PT. Continues to complain of severe postoperative pain.      Objective:        Intake/Output Summary (Last 24 hours) at 4/20/2024 1226  Last data filed at 4/20/2024 1055      Gross per 24 hour   Intake 240 ml   Output 2365 ml   Net -2125 ml         CT Output:   MCT to -20 cm suction with 340 ml serosanguinous drainage in the past 24 hours. No air leak.      Gtt's:  None     Wt preop: 202 lbs  Wt current:   Vitals             04/20/24  0500   Weight: 92.7 kg (204 lb 6.4 oz)               PE:      Vitals:     04/20/24 1119   BP: 108/58   Pulse: 81   Resp: 18   Temp: 97.6 °F (36.4 °C)   SpO2: 92%      GENERAL: NAD, resting comfortably, normal color  CARDIOVASCULAR: Normal HT with appropriate click, RRR. No murmurs, gallops or rubs.   PULMONARY: Fairly clear bilateral breath sounds with scattered rhonchi. No wheezes or rhonchi.   ABDOMEN: Soft, non-tender with active bowel sounds.   EXTREMITIES: No clubbing or cyanosis. Mild BLE edema. Pedal pulses palpated.   INCISIONS: Sternal incision with edges well approximated, but mild separation in lower portion of incision. Noted serous drainage from distal incision. Sternum appears stable.            Lab Results (last 72  hours)         Procedure Component Value Units Date/Time     Basic Metabolic Panel [[id number]]  (Abnormal) Collected: 04/20/24 0402     Specimen: Blood Updated: 04/20/24 0436       Glucose 106 mg/dL         BUN 17 mg/dL         Creatinine 0.57 mg/dL         Sodium 138 mmol/L         Potassium 4.0 mmol/L         Chloride 103 mmol/L         CO2 28.0 mmol/L         Calcium 8.9 mg/dL         BUN/Creatinine Ratio 29.8       Anion Gap 7.0 mmol/L         eGFR 119.4 mL/min/1.73       Narrative:       GFR Normal >60  Chronic Kidney Disease <60  Kidney Failure <15        CBC (No Diff) [[id number]]  (Abnormal) Collected: 04/20/24 0402     Specimen: Blood Updated: 04/20/24 0424       WBC 14.81 10*3/mm3         RBC 3.09 10*6/mm3         Hemoglobin 8.4 g/dL         Hematocrit 26.5 %         MCV 85.8 fL         MCH 27.2 pg         MCHC 31.7 g/dL         RDW 15.3 %         RDW-SD 47.8 fl         MPV 10.9 fL         Platelets 116 10*3/mm3       Protime-INR [[id number]]  (Abnormal) Collected: 04/20/24 0402     Specimen: Blood Updated: 04/20/24 0424       Protime 23.9 Seconds         INR 2.04     Magnesium [[id number]]  (Normal) Collected: 04/19/24 0303     Specimen: Blood Updated: 04/19/24 0434       Magnesium 2.2 mg/dL       Basic Metabolic Panel [[id number]]  (Abnormal) Collected: 04/19/24 0303     Specimen: Blood Updated: 04/19/24 0409       Glucose 122 mg/dL         BUN 21 mg/dL         Creatinine 0.65 mg/dL         Sodium 136 mmol/L         Potassium 4.8 mmol/L         Chloride 104 mmol/L         CO2 25.0 mmol/L         Calcium 8.8 mg/dL         BUN/Creatinine Ratio 32.3       Anion Gap 7.0 mmol/L         eGFR 114.8 mL/min/1.73       Narrative:       GFR Normal >60  Chronic Kidney Disease <60  Kidney Failure <15        Protime-INR [[id number]]  (Abnormal) Collected: 04/19/24 0303     Specimen: Blood Updated: 04/19/24 0358       Protime 15.8 Seconds         INR 1.21     CBC (No Diff) [[id number]]  (Abnormal) Collected: 04/19/24 0306      Specimen: Blood Updated: 04/19/24 0352       WBC 16.58 10*3/mm3         RBC 3.04 10*6/mm3         Hemoglobin 8.4 g/dL         Hematocrit 26.1 %         MCV 85.9 fL         MCH 27.6 pg         MCHC 32.2 g/dL         RDW 15.7 %         RDW-SD 49.4 fl         MPV 11.1 fL         Platelets 109 10*3/mm3       POC Glucose Once [[id number]]  (Abnormal) Collected: 04/18/24 2058     Specimen: Blood Updated: 04/18/24 2109       Glucose 152 mg/dL         Comment: : [id number] Hardy RebeccaMeter ID: [id number]        Tissue Pathology Exam [[id number]] Collected: 04/17/24 1041     Specimen: Tissue from Aortic Leaflet, Tissue from Aortic Aneurysm Updated: 04/18/24 0801     POC Glucose Once [[id number]]  (Abnormal) Collected: 04/18/24 0629     Specimen: Blood Updated: 04/18/24 0650       Glucose 157 mg/dL         Comment: : [id number] York SawyerMeter ID: [id number]        POC Glucose Once [[id number]]  (Abnormal) Collected: 04/18/24 0523     Specimen: Blood Updated: 04/18/24 0544       Glucose 158 mg/dL         Comment: : [id number] York SawyerMeter ID: [id number]        Calcium, Ionized [[id number]] Collected: 04/18/24 0410     Specimen: Blood Updated: 04/18/24 0408       Ionized Calcium 4.85 mg/dL         Collected by [id number]       Comment: Meter: [id number]     :  [id number]        Renal Function Panel [[id number]]  (Abnormal) Collected: 04/18/24 0248     Specimen: Blood Updated: 04/18/24 0330       Glucose 121 mg/dL         BUN 15 mg/dL         Creatinine 0.74 mg/dL         Sodium 138 mmol/L         Potassium 4.7 mmol/L         Chloride 105 mmol/L         CO2 24.0 mmol/L         Calcium 8.7 mg/dL         Albumin 4.3 g/dL         Phosphorus 3.7 mg/dL         Anion Gap 9.0 mmol/L         BUN/Creatinine Ratio 20.3       eGFR 110.4 mL/min/1.73       Narrative:       GFR Normal >60  Chronic Kidney Disease <60  Kidney Failure <15        Magnesium [[id number]]  (Normal) Collected: 04/18/24 0248     Specimen: Blood  Updated: 04/18/24 0330       Magnesium 1.7 mg/dL       Protime-INR [[id number]]  (Abnormal) Collected: 04/18/24 0248     Specimen: Blood Updated: 04/18/24 0323       Protime 15.5 Seconds         INR 1.18     CBC & Differential [[id number]]  (Abnormal) Collected: 04/18/24 0248     Specimen: Blood Updated: 04/18/24 0313     Narrative:       The following orders were created for panel order CBC & Differential.  Procedure                               Abnormality         Status                     ---------                               -----------         ------                     CBC Auto Differential[[id number]]        Abnormal            Final result                  Please view results for these tests on the individual orders.     CBC Auto Differential [[id number]]  (Abnormal) Collected: 04/18/24 0248     Specimen: Blood Updated: 04/18/24 0313       WBC 15.20 10*3/mm3         RBC 3.47 10*6/mm3         Hemoglobin 9.5 g/dL         Hematocrit 29.5 %         MCV 85.0 fL         MCH 27.4 pg         MCHC 32.2 g/dL         RDW 15.1 %         RDW-SD 46.7 fl         MPV 9.9 fL         Platelets 132 10*3/mm3         Neutrophil % 84.5 %         Lymphocyte % 7.2 %         Monocyte % 7.9 %         Eosinophil % 0.0 %         Basophil % 0.1 %         Neutrophils, Absolute 12.84 10*3/mm3         Lymphocytes, Absolute 1.09 10*3/mm3         Monocytes, Absolute 1.20 10*3/mm3         Eosinophils, Absolute 0.00 10*3/mm3         Basophils, Absolute 0.02 10*3/mm3       POC Glucose Once [[id number]]  (Normal) Collected: 04/18/24 0252     Specimen: Blood Updated: 04/18/24 0313       Glucose 123 mg/dL         Comment: : [id number] York SawyerMeter ID: [id number]        POC Glucose Once [[id number]]  (Normal) Collected: 04/18/24 0156     Specimen: Blood Updated: 04/18/24 0217       Glucose 128 mg/dL         Comment: : [id number] York SawyerMeter ID: [id number]        POC Glucose Once [[id number]]  (Abnormal) Collected: 04/17/24 9086      Specimen: Blood Updated: 04/18/24 0006       Glucose 143 mg/dL         Comment: : [id number] York SawyerMeter ID: [id number]        Blood Gas, Arterial - [[id number]]  (Abnormal) Collected: 04/17/24 2318     Specimen: Arterial Blood Updated: 04/17/24 2317       Site Arterial Line       Jhon's Test N/A       pH, Arterial 7.365 pH units         pCO2, Arterial 41.5 mm Hg         pO2, Arterial 101.0 mm Hg         HCO3, Arterial 23.7 mmol/L         Base Excess, Arterial -1.6 mmol/L         Comment: 84 Value below reference range          O2 Saturation, Arterial 98.1 %         Temperature 37.0       Barometric Pressure for Blood Gas 749 mmHg         Modality Ventilator       FIO2 30 %         Ventilator Mode SPONTANEOUS       PEEP 5.0       PSV 10.0 cmH2O         Collected by [id number]       Comment: Meter: [id number]     :  [id number]          pCO2, Temperature Corrected 41.5 mm Hg         pH, Temp Corrected 7.365 pH Units         pO2, Temperature Corrected 101 mm Hg       POC Glucose Once [[id number]]  (Abnormal) Collected: 04/17/24 2152     Specimen: Blood Updated: 04/17/24 2213       Glucose 140 mg/dL         Comment: : [id number] York SawyerMeter ID: [id number]        POC Glucose Once [[id number]]  (Abnormal) Collected: 04/17/24 2055     Specimen: Blood Updated: 04/17/24 2106       Glucose 151 mg/dL         Comment: : [id number] York SawyerMeter ID: [id number]        POC Glucose Once [[id number]]  (Abnormal) Collected: 04/17/24 1952     Specimen: Blood Updated: 04/17/24 2012       Glucose 160 mg/dL         Comment: : [id number] York SawyerMeter ID: [id number]        POC Glucose Once [[id number]]  (Abnormal) Collected: 04/17/24 1850     Specimen: Blood Updated: 04/17/24 1901       Glucose 175 mg/dL         Comment: : [id number] Vandana MalloryMeter ID: [id number]        STAT Lactic Acid, Reflex [[id number]]  (Abnormal) Collected: 04/17/24 1749     Specimen: Blood Updated: 04/17/24 1820        Lactate 2.9 mmol/L       Renal Function Panel [[id number]]  (Abnormal) Collected: 04/17/24 1749     Specimen: Blood Updated: 04/17/24 1816       Glucose 189 mg/dL         BUN 11 mg/dL         Creatinine 0.71 mg/dL         Sodium 141 mmol/L         Potassium 4.4 mmol/L         Comment: Slight hemolysis detected by analyzer. Result may be falsely elevated.          Chloride 107 mmol/L         CO2 26.0 mmol/L         Calcium 9.6 mg/dL         Albumin 4.1 g/dL         Phosphorus 3.5 mg/dL         Anion Gap 8.0 mmol/L         BUN/Creatinine Ratio 15.5       eGFR 111.8 mL/min/1.73       Narrative:       GFR Normal >60  Chronic Kidney Disease <60  Kidney Failure <15        POC Glucose Once [[id number]]  (Abnormal) Collected: 04/17/24 1748     Specimen: Blood Updated: 04/17/24 1759       Glucose 183 mg/dL         Comment: : [id number] Ramos EmilyMeter ID: [id number]        CBC (No Diff) [[id number]]  (Abnormal) Collected: 04/17/24 1749     Specimen: Blood Updated: 04/17/24 1758       WBC 11.19 10*3/mm3         RBC 4.54 10*6/mm3         Hemoglobin 12.4 g/dL         Hematocrit 38.5 %         MCV 84.8 fL         MCH 27.3 pg         MCHC 32.2 g/dL         RDW 14.8 %         RDW-SD 45.8 fl         MPV 9.7 fL         Platelets 154 10*3/mm3       POC Glucose Once [[id number]]  (Abnormal) Collected: 04/17/24 1640     Specimen: Blood Updated: 04/17/24 1653       Glucose 194 mg/dL         Comment: : [id number] ViewCastilyMeter ID: [id number]        Blood Gas, Arterial - [[id number]]  (Abnormal) Collected: 04/17/24 1552     Specimen: Arterial Blood Updated: 04/17/24 1550       Site Arterial Line       Jhon's Test N/A       pH, Arterial 7.356 pH units         pCO2, Arterial 47.2 mm Hg         Comment: 83 Value above reference range          pO2, Arterial 90.6 mm Hg         HCO3, Arterial 26.4 mmol/L         Comment: 83 Value above reference range          Base Excess, Arterial 0.4 mmol/L         O2 Saturation, Arterial  97.1 %         Temperature 37.0       Barometric Pressure for Blood Gas 746 mmHg         Modality Ventilator       FIO2 30 %         Ventilator Mode SIMV       Set Tidal Volume 550       Set Mech Resp Rate 20.0       PEEP 8.0       PSV 10.0 cmH2O         Collected by [id number]       Comment: Meter: [id number]     :  [id number]          pCO2, Temperature Corrected 47.2 mm Hg         pH, Temp Corrected 7.356 pH Units         pO2, Temperature Corrected 90.6 mm Hg       POC Glucose Once [[id number]]  (Abnormal) Collected: 04/17/24 1537     Specimen: Blood Updated: 04/17/24 1548       Glucose 191 mg/dL         Comment: : [id number] Ramos EmilyMeter ID: [id number]        POC Glucose Once [[id number]]  (Abnormal) Collected: 04/17/24 1431     Specimen: Blood Updated: 04/17/24 1445       Glucose 160 mg/dL         Comment: : [id number] Ramos EmilyMeter ID: [id number]        Renal Function Panel [[id number]]  (Abnormal) Collected: 04/17/24 1336     Specimen: Blood Updated: 04/17/24 1423       Glucose 160 mg/dL         BUN 11 mg/dL         Creatinine 0.65 mg/dL         Sodium 143 mmol/L         Potassium 4.0 mmol/L         Comment: Specimen hemolyzed.  Result may be falsely elevated.          Chloride 111 mmol/L         CO2 24.0 mmol/L         Calcium 9.7 mg/dL         Albumin 3.5 g/dL         Phosphorus 3.7 mg/dL         Anion Gap 8.0 mmol/L         BUN/Creatinine Ratio 16.9       eGFR 114.8 mL/min/1.73       Narrative:       GFR Normal >60  Chronic Kidney Disease <60  Kidney Failure <15        Lactic Acid, Plasma [[id number]]  (Abnormal) Collected: 04/17/24 1336     Specimen: Blood Updated: 04/17/24 1418       Lactate 3.3 mmol/L       Protime-INR [[id number]]  (Abnormal) Collected: 04/17/24 1336     Specimen: Blood Updated: 04/17/24 1411       Protime 17.2 Seconds         INR 1.35     Fibrinogen [[id number]]  (Abnormal) Collected: 04/17/24 1336     Specimen: Blood Updated: 04/17/24 1411       Fibrinogen 178  mg/dL       CBC (No Diff) [[id number]]  (Abnormal) Collected: 04/17/24 1336     Specimen: Blood Updated: 04/17/24 1404       WBC 18.29 10*3/mm3         RBC 4.17 10*6/mm3         Hemoglobin 11.5 g/dL         Hematocrit 35.6 %         MCV 85.4 fL         MCH 27.6 pg         MCHC 32.3 g/dL         RDW 14.8 %         RDW-SD 45.9 fl         MPV 10.0 fL         Platelets 115 10*3/mm3       Calcium, Ionized [[id number]] Collected: 04/17/24 1350     Specimen: Blood Updated: 04/17/24 1349       Ionized Calcium 5.34 mg/dL         Collected by [id number]       Comment: Meter: [id number]     :  [id number]        Blood Gas, Arterial With Co-Ox [[id number]]  (Abnormal) Collected: 04/17/24 1349     Specimen: Arterial Blood Updated: 04/17/24 1347       Site Arterial Line       Jhon's Test N/A       pH, Arterial 7.355 pH units         pCO2, Arterial 42.6 mm Hg         pO2, Arterial 221.0 mm Hg         Comment: 83 Value above reference range          HCO3, Arterial 23.8 mmol/L         Base Excess, Arterial -1.8 mmol/L         Comment: 84 Value below reference range          O2 Saturation, Arterial 99.6 %         Comment: 83 Value above reference range          Hemoglobin, Blood Gas 12.1 g/dL         Comment: 84 Value below reference range          Hematocrit, Blood Gas 37.0 %         Comment: 84 Value below reference range          Oxyhemoglobin 97.6 %         Methemoglobin 1.10 %         Carboxyhemoglobin 0.9 %         A-a DO2 153.5 mmHg         Temperature 37.0       Sodium, Arterial 142 mmol/L         Potassium, Arterial 3.8 mmol/L         Barometric Pressure for Blood Gas 747 mmHg         Modality Ventilator       FIO2 60 %         Ventilator Mode SIMV       Set Tidal Volume 550       Set Mech Resp Rate 20.0       PEEP 10.0       PSV 10.0 cmH2O         Collected by [id number]       Comment: Meter: [id number]     :  [id number]          pH, Temp Corrected 7.355 pH Units         pCO2, Temperature Corrected 42.6 mm Hg          pO2, Temperature Corrected 221 mm Hg       CBC & Differential [[id number]]  (Abnormal) Collected: 04/17/24 1228     Specimen: Blood, Arterial Line Updated: 04/17/24 1347     Narrative:       The following orders were created for panel order CBC & Differential.  Procedure                               Abnormality         Status                     ---------                               -----------         ------                     CBC Auto Differential[[id number]]        Abnormal            Final result                  Please view results for these tests on the individual orders.     CBC Auto Differential [[id number]]  (Abnormal) Collected: 04/17/24 1228     Specimen: Blood, Arterial Line Updated: 04/17/24 1347       WBC 15.37 10*3/mm3         RBC 3.48 10*6/mm3         Hemoglobin 9.9 g/dL         Hematocrit 30.1 %         MCV 86.5 fL         MCH 28.4 pg         MCHC 32.9 g/dL         RDW 14.7 %         RDW-SD 46.5 fl         MPV 9.8 fL         Platelets 109 10*3/mm3         Neutrophil % 83.8 %         Lymphocyte % 10.0 %         Monocyte % 3.7 %         Eosinophil % 0.9 %         Basophil % 0.5 %         Immature Grans % 1.1 %         Neutrophils, Absolute 12.85 10*3/mm3         Lymphocytes, Absolute 1.53 10*3/mm3         Monocytes, Absolute 0.57 10*3/mm3         Eosinophils, Absolute 0.14 10*3/mm3         Basophils, Absolute 0.07 10*3/mm3         Immature Grans, Absolute 0.17 10*3/mm3         nRBC 0.0 /100 WBC       aPTT [[id number]]  (Abnormal) Collected: 04/17/24 1228     Specimen: Blood, Arterial Line Updated: 04/17/24 1342       PTT 42.1 seconds       Fibrinogen [[id number]]  (Abnormal) Collected: 04/17/24 1228     Specimen: Blood, Arterial Line Updated: 04/17/24 1250       Fibrinogen 180 mg/dL       Protime-INR [[id number]]  (Abnormal) Collected: 04/17/24 1228     Specimen: Blood, Arterial Line Updated: 04/17/24 1250       Protime 18.7 Seconds         INR 1.50     CBC (No Diff) [[id number]]  (Abnormal)  Collected: 04/17/24 1228     Specimen: Blood, Arterial Line Updated: 04/17/24 1241       WBC 15.37 10*3/mm3         RBC 3.48 10*6/mm3         Hemoglobin 9.9 g/dL         Hematocrit 30.1 %         MCV 86.5 fL         MCH 28.4 pg         MCHC 32.9 g/dL         RDW 14.7 %         RDW-SD 46.5 fl         MPV 9.8 fL         Platelets 109 10*3/mm3                  Rhythm: Normal sinus rhythm     CXR: Normal postoperative changes with small bilateral pleural effusions.         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 Patient is doing fairly well, except for pulling up with arms. On warfarin with daily monitoring of PT/INR, metoprolol 12.5 mg BID.     Dyslipidemia - on statin    GERD - on PPI    Hx seizures - on Klonopin    Hx ETOH abuse - no evidence of withdrawal    Daily cigarette smoker     Decrease warfarin to 2 mg nightly. Recheck PT/INR in AM. Remove pacing wires. Keep MCT. Will adjust pain medication - add lidoderm patch.         ISABEL Watson                      Current Facility-Administered Medications   Medication Dose Route Frequency Provider Last Rate Last Admin    acetaminophen (TYLENOL) tablet 650 mg  650 mg Oral Q6H Duran, Sandeep HECTOR MD   650 mg at 04/22/24 0552    ARIPiprazole (ABILIFY) tablet 5 mg  5 mg Oral Daily Duran, Sandeep HECTOR MD   5 mg at 04/22/24 0932    aspirin EC tablet 81 mg  81 mg Oral Daily Duran, Sandeep HECTOR MD   81 mg at 04/22/24 0932    atorvastatin (LIPITOR) tablet 20 mg  20 mg Oral Nightly Duran, Sandeep HECTOR MD   20 mg at 04/21/24 2019    bisacodyl (DULCOLAX) EC tablet 10 mg  10 mg Oral BID Roger, Sandeep HECTOR MD   10 mg at 04/22/24 0932    Calcium Replacement - Follow Nurse / BPA Driven Protocol   Does not apply PRN  Sandeep Duran MD        clonazePAM (KlonoPIN) tablet 0.5 mg  0.5 mg Oral Q12H Sandeep Duran MD   0.5 mg at 04/22/24 0933    dextrose 5 % with KCl 20 mEq/L infusion  30 mL/hr Intravenous Continuous Sandeep Duran MD 30 mL/hr at 04/17/24 1423 30 mL/hr at 04/17/24 1423    And    dextrose 5 % with KCl 20 mEq/L infusion  30 mL/hr Intravenous Continuous Sandeep Duran MD 30 mL/hr at 04/17/24 1354 30 mL/hr at 04/17/24 1354    Enoxaparin Sodium (LOVENOX) syringe 40 mg  40 mg Subcutaneous Daily Yunier De Santiago MD   40 mg at 04/22/24 0932    furosemide (LASIX) injection 20 mg  20 mg Intravenous BID Mehnaz Pizano APRN   20 mg at 04/22/24 0932    guaiFENesin (MUCINEX) 12 hr tablet 1,200 mg  1,200 mg Oral Q12H Liliana Aguilar APRN   1,200 mg at 04/22/24 0937    iron polysaccharides (NIFEREX) capsule 150 mg  150 mg Oral Daily Mehnaz Pizano APRN   150 mg at 04/22/24 0934    Lidocaine 4 % 2 patch  2 patch Transdermal Q24H Liliana Aguilar APRN   2 patch at 04/22/24 0934    Magnesium Cardiology Dose Replacement - Follow Nurse / BPA Driven Protocol   Does not apply PRN Sandeep Duran MD        methocarbamol (ROBAXIN) tablet 500 mg  500 mg Oral Q8H Mehnaz Pizano APRN   500 mg at 04/22/24 0552    metoprolol tartrate (LOPRESSOR) tablet 25 mg  25 mg Oral Q12H Yunier De Santiago MD   25 mg at 04/22/24 0932    nitroglycerin (NITROSTAT) SL tablet 0.4 mg  0.4 mg Sublingual Q5 Min PRN Sandeep Duran MD        ondansetron (ZOFRAN) injection 4 mg  4 mg Intravenous Q6H PRN Sandeep Duran MD        oxyCODONE (ROXICODONE) immediate release tablet 5 mg  5 mg Oral Q4H PRN Sandeep Duran MD   5 mg at 04/21/24 2019    pantoprazole (PROTONIX) EC tablet 40 mg  40 mg Oral Q AM Mehnaz Pizano APRN   40 mg at 04/22/24 0552    Phosphorus Replacement - Follow Nurse / BPA Driven Protocol   Does not apply PRN Sandeep Duran MD        polyethylene glycol (MIRALAX) packet 17 g  17 g Oral Daily Sandeep Duran MD   17 g at 04/22/24  0934    potassium chloride (MICRO-K/KLOR-CON) CR capsule  20 mEq Oral BID With Meals Mehnaz Pizano APRN   20 mEq at 04/22/24 0932    Potassium Replacement - Follow Nurse / BPA Driven Protocol   Does not apply PRN Sandeep Duran MD        pregabalin (LYRICA) capsule 25 mg  25 mg Oral Q12H Liliana Aguilar APRN   25 mg at 04/22/24 0932    QUEtiapine (SEROquel) tablet 100 mg  100 mg Oral Nightly Sandeep Duran MD   100 mg at 04/21/24 2020    sertraline (ZOLOFT) tablet 200 mg  200 mg Oral Daily Sandeep Duran MD   200 mg at 04/22/24 0932    warfarin (COUMADIN) tablet 3 mg  3 mg Oral Daily Liliana Aguilar APRN   3 mg at 04/21/24 7132

## 2024-04-22 NOTE — PLAN OF CARE
Goal Outcome Evaluation:  Plan of Care Reviewed With: patient        Progress: no change  Outcome Evaluation: Patient conitnues to be somewhat confused this shift. Although he is able to answer orientation questions correctly he often times does not follow instruction and has to be reminded repeatedly about sternal precautions. Patient has been moving from his chair and back several times today and has been found on more than one occasion turning off the chair alarm. He frequently is found standing and when asked what he needs he is unable to answer and says he has forgotten. At one point the patient had to be reminded that a urinal was not to be used to catch stool when going to the bathroom. Patient's wife has confirmed that the patient will not be able to return home with her at discharge and the patient does not know any contact information for other family members. When the patient's wife was contacted she stated that the patient had no family support left that would be able to help him. Case management has put in referrals to different facilities to see about placement. S 86-95 with 3.08 sec of SVT hr up to 188 and a 3.15 sec run of PAT up to 143 per tele. MD aware. Call light in reach and teaching given on the importance of calling for assistance.

## 2024-04-22 NOTE — PLAN OF CARE
Goal Outcome Evaluation:  Plan of Care Reviewed With: patient        Progress: no change  Outcome Evaluation: VSS. SR 82-99 PVC PAC on tele. Pt has been confused and not rested much this shift. He is using bedside urinal. Possible rehab placement at [facility]. Safety maintained.

## 2024-04-22 NOTE — THERAPY TREATMENT NOTE
Acute Care - Physical Therapy Treatment Note   [place]     Patient Name: Nic Castro  : 1975  MRN: [id number]  Today's Date: 2024      Visit Dx:     ICD-10-CM ICD-9-CM   1. Impaired mobility [Z74.09]  Z74.09 799.89   2. Aneurysm of the ascending aorta, without rupture  I71.21 441.2     Patient Active Problem List   Diagnosis    Aneurysm of the ascending aorta, without rupture    Chest pain    Dyslipidemia    Tobacco use    Overweight with body mass index (BMI) of 28 to 28.9 in adult    Aortic root aneurysm     Past Medical History:   Diagnosis Date    AAA (abdominal aortic aneurysm)     Anxiety     Arthritis     GERD (gastroesophageal reflux disease)     Hepatitis C     Hyperlipidemia     Hypertension     Pancreatitis     Seizure     from head injury    Substance abuse      Past Surgical History:   Procedure Laterality Date    ANKLE LIGAMENT RECONSTRUCTION Left 2015    ASCENDING ARCH/HEMIARCH REPLACEMENT N/A 2024    Procedure: AORTIC ROOT REPLACEMENT, ASCENDING AORTIC HEMIARCH REPLACEMENT, AORTIC VALVE REPLACEMENT WITH MECHANICAL VALVE WITH CIRCULATORY ARREST, TRANSESOPHAGEAL ECHOCARDIOGRAM;  Surgeon: Sandeep Duran MD;  Location: [facility];  Service: Cardiothoracic;  Laterality: N/A;    CARDIAC CATHETERIZATION N/A 2023    Procedure: Left Heart Cath;  Surgeon: Nikolas Ramos MD;  Location:  [facility]ATH [facility]ATION;  Service: Cardiology;  Laterality: N/A;    TOOTH EXTRACTION      Full mouth extraction     PT Assessment (Last 12 Hours)       PT Evaluation and Treatment       Row Name 24 1335 24 0905       Physical Therapy Time and Intention    Subjective Information complains of;pain  -MF complains of;pain  -MF    Document Type therapy note (daily note)  -MF therapy note (daily note)  -MF    Mode of Treatment physical therapy  -MF physical therapy  -MF    Comment still having intermittent confusion-pt opened br door to walk into hallway and other nonsensical actions   - pt will be reminded of sternal precautions and the very next moment use his arms to stand or sit down. Pt. voices understanding, but does not demonstrate that he understands.  -      Row Name 04/22/24 1335 04/22/24 0905       General Information    Existing Precautions/Restrictions fall;sternal  -MF fall;sternal  -MF    Limitations/Impairments safety/cognitive  - safety/cognitive  -      Row Name 04/22/24 1335 04/22/24 0905       Pain    Pretreatment Pain Rating 6/10  - 6/10  -MF    Posttreatment Pain Rating 6/10  - 8/10  -MF    Pain Location incisional  -MF incisional  -MF    Pain Location - chest  -MF chest  -MF    Pre/Posttreatment Pain Comment -- 8/10 with coughing  -    Pain Intervention(s) Repositioned;Ambulation/increased activity  - Repositioned;Ambulation/increased activity  -      Row Name 04/22/24 1335          Bed Mobility    Comment, (Bed Mobility) chair  -       Row Name 04/22/24 1335 04/22/24 0905       Transfers    Comment, (Transfers) cues for sternal precautions  - practiced sit to stand x 5 cues for sternal precautions  -      Row Name 04/22/24 1335 04/22/24 0905       Sit-Stand Transfer    Sit-Stand [place] (Transfers) independent  - independent  -      Row Name 04/22/24 1335 04/22/24 0905       Stand-Sit Transfer    Stand-Sit [place] (Transfers) independent  - independent  -      Row Name 04/22/24 1335 04/22/24 0905       Gait/Stairs (Locomotion)    [place] Level (Gait) supervision  - supervision  -    Distance in Feet (Gait) 600  1 standing rest  - 600  1 standing rest  -    Deviations/Abnormal Patterns (Gait) patricia decreased  - patricia decreased  -      Row Name 04/22/24 1335 04/22/24 0905       Motor Skills    Comments, Therapeutic Exercise cardiac warm ups  - cardiac warm ups x 15  -      Row Name             Wound 04/17/24 0809 sternal Incision    Wound - [facility] Placement Date: 04/17/24  - Placement Time: 0809   -LH Present on Original Admission: N  -LH Location: sternal  -LH Primary Wound Type: Incision  -LH    Retired Wound - [facility] Group Placement Date: 04/17/24  -LH Placement Time: 0809  -LH Present on Original Admission: N  -LH Location: sternal  -LH Primary Wound Type: Incision  -LH    Retired Wound - [facility] Group Date first assessed: 04/17/24  -LH Time first assessed: 0809  -LH Present on Original Admission: N  -LH Location: sternal  -LH Primary Wound Type: Incision  -LH      Row Name             Wound 04/21/24 1130 Left Abrasion    Wound - [facility] Group Placement Date: 04/21/24  -AG Placement Time: 1130  -AG Present on Original Admission: N  -AG Side: Left  -AG Primary Wound Type: Abrasion  -AG    Retired Wound - [facility] Group Placement Date: 04/21/24  -AG Placement Time: 1130  -AG Present on Original Admission: N  -AG Side: Left  -AG Primary Wound Type: Abrasion  -AG    Retired Wound - [facility] Group Date first assessed: 04/21/24  -AG Time first assessed: 1130  -AG Present on Original Admission: N  -AG Side: Left  -AG Primary Wound Type: Abrasion  -AG      Row Name 04/22/24 0905          Plan of Care Review    Plan of Care Reviewed With patient  -MF     Progress no change  -     Outcome Evaluation Pt. agreeable to therapy. He rates his pain 6-7/10 all over. He continues to over use his arms for transfers and shifting his weight in chair. Despite education and reminders, he continues to do use this arms. When  pt does follow this instructions, he is Independent with transfers. He walked 600' with 1 standing rest-with Supervision. O2 sat was 91-93% while on RA. Educated pt on the benefits of walking and activity following this surgery.  -       Row Name 04/22/24 1335 04/22/24 0905       Vital Signs    Pre SpO2 (%) 93  -MF --    O2 Delivery Pre Treatment room air  -MF --    Intra SpO2 (%) 91  -MF 91  -MF    O2 Delivery Intra Treatment room air  -MF room air  -MF    Post SpO2 (%) 93  -MF 93  -MF     O2 Delivery Post Treatment room air  -MF room air  -MF    Pre Patient Position Sitting  -MF --    Intra Patient Position Standing  -MF --    Post Patient Position Sitting  -MF --      Row Name 04/22/24 1335 04/22/24 0905       Positioning and Restraints    Pre-Treatment Position sitting in chair/recliner  -MF sitting in chair/recliner  -MF    Post Treatment Position chair  -MF chair  -MF    In Chair reclined;call light within reach;encouraged to call for assist;exit alarm on;notified nsg  -MF reclined;call light within reach;encouraged to call for assist;exit alarm on  -MF              User Key  (r) = Recorded By, (t) = Taken By, (c) = Cosigned By      Initials Name Provider Type    Sofi Serrano, RN Registered Nurse    Rissa Colon, DOUG Physical Therapist Assistant    Marge Tinajero RN Registered Nurse                    Physical Therapy Education       Title: PT OT SLP Therapies (In Progress)       Topic: Physical Therapy (In Progress)       Point: Mobility training (Done)       Learning Progress Summary             Patient Acceptance, E,D, DU,VU by ALFREDO at 4/21/2024 1139    Comment: bed transfers    Acceptance, E, VU by ALONA at 4/18/2024 0853    Comment: safe mobility, fall risk, sternal precautions                         Point: Home exercise program (Done)       Learning Progress Summary             Patient Acceptance, E, VU by ALONA at 4/18/2024 0853    Comment: safe mobility, fall risk, sternal precautions                         Point: Body mechanics (Done)       Learning Progress Summary             Patient Acceptance, E, VU by ALONA at 4/18/2024 0853    Comment: safe mobility, fall risk, sternal precautions                         Point: Precautions (In Progress)       Learning Progress Summary             Patient Acceptance, E,D, NR by ALFREDO at 4/20/2024 1142    Comment: sternal restrictions    Acceptance, E, VU by ALONA at 4/18/2024 0853    Comment: safe mobility, fall risk, sternal precautions                                          User Key       Initials Effective Dates Name Provider Type Discipline     02/03/23 -  Ellen Huggins PTA Physical Therapist Assistant PT    ALONA 02/22/24 -  Sandeep Person, PT Student PT Student PT                  PT Recommendation and Plan     Plan of Care Reviewed With: patient  Progress: no change  Outcome Evaluation: Pt. agreeable to therapy. He rates his pain 6-7/10 all over. He continues to over use his arms for transfers and shifting his weight in chair. Despite education and reminders, he continues to do use this arms. When  pt does follow this instructions, he is Independent with transfers. He walked 600' with 1 standing rest-with Supervision. O2 sat was 91-93% while on RA. Educated pt on the benefits of walking and activity following this surgery.   Outcome Measures       Row Name 04/22/24 0905 04/21/24 1100 04/20/24 1100       How much help from another person do you currently need...    Turning from your back to your side while in flat bed without using bedrails? 4  - 4  -ALFREDO 4  -ALFREDO    Moving from lying on back to sitting on the side of a flat bed without bedrails? 3  - 4  -ALFREDO 3  -ALFREDO    Moving to and from a bed to a chair (including a wheelchair)? 4  - 4  -ALFREDO 3  -ALFREDO    Standing up from a chair using your arms (e.g., wheelchair, bedside chair)? 4  - 4  -ALFREDO 3  -ALFREDO    Climbing 3-5 steps with a railing? 3  - 4  -ALFREDO 3  -ALFREDO    To walk in hospital room? 4  - 4  -ALFREDO 3  -ALFREDO    AM-PAC 6 Clicks Score (PT) 22  -MF 24  -ALFREDO 19  -ALFREDO    Highest Level of Mobility Goal 7 --> Walk 25 feet or more  - 8 --> Walked 250 feet or more  -ALFREDO 6 --> Walk 10 steps or more  -ALFREDO       Functional Assessment    Outcome Measure Options AM-PAC 6 Clicks Basic Mobility (PT)  - -- --              User Key  (r) = Recorded By, (t) = Taken By, (c) = Cosigned By      Initials Name Provider Type    ALFREDO Ellen Huggins, DOUG Physical Therapist Assistant    Rissa Colon PTA Physical  Therapist Assistant                     Time Calculation:    PT Charges       Row Name 04/22/24 1422 04/22/24 1031          Time Calculation    Start Time 1335  -MF 0905  -MF     Stop Time 1348  -MF 0930  -MF     Time Calculation (min) 13 min  -MF 25 min  -MF     PT Received On -- 04/22/24  -MF        Time Calculation- PT    Total Timed Code Minutes- PT 13 minute(s)  -MF 25 minute(s)  -MF        Timed Charges    42776 - Gait Training Minutes  13  -MF 25  -MF        Total Minutes    Timed Charges Total Minutes 13  -MF 25  -MF      Total Minutes 13  -MF 25  -MF               User Key  (r) = Recorded By, (t) = Taken By, (c) = Cosigned By      Initials Name Provider Type    Rissa Colon PTA Physical Therapist Assistant                  Therapy Charges for Today       Code Description Service Date Service Provider Modifiers Qty    82403267978 HC GAIT TRAINING EA 15 MIN 4/22/2024 Rissa Alva PTA GP 2    33262722651 HC GAIT TRAINING EA 15 MIN 4/22/2024 Rissa Alva PTA GP 1            PT G-Codes  Outcome Measure Options: AM-PAC 6 Clicks Basic Mobility (PT)  AM-PAC 6 Clicks Score (PT): 22    Rissa Alva PTA  4/22/2024

## 2024-04-22 NOTE — CASE MANAGEMENT/SOCIAL WORK
Continued Stay Note   [place]     Patient Name: Nic Castro  MRN: [id number]  Today's Date: 4/22/2024    Admit Date: 4/17/2024    Plan: Possible SNF - Referral to [facility]   Discharge Plan       Row Name 04/22/24 0942       Plan    Plan Possible SNF - Referral to [facility]    Plan Comments Spoke to Shira in admissions at [facility], she did not receive a referral but will evaluate today. SW sent referral to [facility]. Unsure if patient's insurance will cover SNF so will wait on update from [facility] / clarification on insurance. It is also unlikely that patient will meet level of care requirements because he was able to ambulate 600ft with therapy yesterday.             ANNA Benjamin

## 2024-04-22 NOTE — PLAN OF CARE
Goal Outcome Evaluation:  Plan of Care Reviewed With: patient        Progress: no change  Outcome Evaluation: Pt. agreeable to therapy. He rates his pain 6-7/10 all over. He continues to over use his arms for transfers and shifting his weight in chair. Despite education and reminders, he continues to do use this arms. When  pt does follow this instructions, he is Independent with transfers. He walked 600' with 1 standing rest-with Supervision. O2 sat was 91-93% while on RA. Educated pt on the benefits of walking and activity following this surgery.

## 2024-04-22 NOTE — PROGRESS NOTES
"Patient name: Nic Castro  Patient : 1975  VISIT # [id number]  MR #[id number]    Procedure:Procedure(s):  AORTIC ROOT REPLACEMENT, ASCENDING AORTIC HEMIARCH REPLACEMENT, AORTIC VALVE REPLACEMENT WITH MECHANICAL VALVE WITH CIRCULATORY ARREST, TRANSESOPHAGEAL ECHOCARDIOGRAM  Procedure Date:2024  POD:5 Days Post-Op    Subjective     Patient is on room air lying in bed.  INR today is 2.18 and he is on Coumadin 3 mg nightly.  He is having bowel movements.  Walking 600 feet with physical therapy.  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  He tells me today that he meant that he used methamphetamine 2 weeks prior to getting sober and has not used drugs since October of last year.  Patient also tells me that he does not have a discharge plan as his wife now states that he cannot go home with him.  He does report that he has a nephew that lives in [facility] that he may be able to stay with.  Social work is also working on rehab placement.  He reports ongoing issues with pain control.  No documented weight today.    Telemetry: Sinus rhythm 82-99  IV drips: None       Objective     Visit Vitals  /50 (BP Location: Left arm, Patient Position: Lying)   Pulse 91   Temp 98.9 °F (37.2 °C) (Oral)   Resp 18   Ht 180.3 cm (71\")   Wt 90.8 kg (200 lb 3.2 oz)   SpO2 92%   BMI 27.92 kg/m²   Baseline weight 199 pounds    Intake/Output Summary (Last 24 hours) at 2024 0801  Last data filed at 2024 0554  Gross per 24 hour   Intake 840 ml   Output 1675 ml   Net -835 ml       Lab:     CBC:  Results from last 7 days   Lab Units 24  0448 24  0429 24  0402   WBC 10*3/mm3 13.30* 15.82* 14.81*   HEMATOCRIT % 26.8* 28.2* 26.5*   PLATELETS 10*3/mm3 164 146 116*          BMP:  Results from last 7 days   Lab Units 24  0448 24  1616 24  0429 24  0402   SODIUM mmol/L 136  --  134* 138   POTASSIUM mmol/L 4.0 4.2 3.7 4.0 "   CHLORIDE mmol/L 102  --  98 103   CO2 mmol/L 26.0  --  26.0 28.0   GLUCOSE mg/dL 102*  --  94 106*   BUN mg/dL 16  --  17 17   CREATININE mg/dL 0.62*  --  0.61* 0.57*          COAG:  Results from last 7 days   Lab Units 04/22/24  0448 04/17/24  1336 04/17/24  1228   INR  2.18*   < > 1.50*   APTT seconds  --   --  42.1*    < > = values in this interval not displayed.       IMAGES:       Imaging Results (Last 24 Hours)       Procedure Component Value Units Date/Time    XR Chest 1 View [[id number]] Collected: 04/22/24 0710     Updated: 04/22/24 0714    Narrative:      EXAMINATION: XR CHEST 1 VW-  4/22/2024 7:10 AM 1 view     HISTORY: Post-Op Heart Surgery     COMPARISON: 4/21/2024     TECHNIQUE: A single frontal view of the chest was obtained.     FINDINGS:  Airspace opacities in the upper lobes are again seen and are unchanged  since the most recent comparison study. Edema versus multifocal  pneumonia are the primary considerations. No change in the  cardiomediastinal silhouette or pulmonary vasculature. Median sternotomy  wires are again noted. Chest tube and mediastinal drains have been  removed. No pneumothorax.       Impression:         1.  Interval removal of chest tube and mediastinal drains.     2.  Persistent bilateral airspace opacities in the upper lobes, likely  edema. Multifocal pneumonia is possible but felt to be less likely.  Findings are essentially unchanged in the lungs.           This report was signed and finalized on 4/22/2024 7:11 AM by Dr. Jered Negrete MD.             CXR: Pulmonary edema improved.  No pneumothorax.    Physical Exam:  General: Alert, oriented. No apparent distress.   Cardiovascular: Regular rate and rhythm without murmur, rubs, or gallops.    Pulmonary: Diminished bilaterally without wheezing, rubs, or rales.  Chest: Sternotomy incision clean, dry, and intact.  Steri-Strips have been removed from the mid and lower portion of the sternal incision.  Mild serosanguineous  drainage from the lower sternal incision.  Edges are well-approximated.  No purulent drainage or erythema.  Sternum stable. No clicks.    Abdomen: Soft, nondistended, and nontender.  Extremities: Warm, moves all extremities.  Mild lower extremity edema.  Neurologic:  Grossly intact with no focal deficits.            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  Daily PT/INR.  Goal INR is 2-3  Discussed with nursing to replace Steri-Strips.  Start iron supplement  Discontinue oxycodone 10 mg tablets  Repeat CTA chest today to obtain new baseline.  Encourage pulmonary toilet and ambulation  Routine postcardiac surgery care  Discharge planning.  Await approval/denial to [facility].  Patient to contact his nephew today to see if he will be able to stay with him at discharge if unable to go to rehab.  Discussed with patient and nursing          ISABEL Yin  04/22/24  08:01 CDT

## 2024-04-22 NOTE — THERAPY TREATMENT NOTE
Acute Care - Physical Therapy Treatment Note  [facility]     Patient Name: Nic Castro  : 1975  MRN: [id number]  Today's Date: 2024      Visit Dx:     ICD-10-CM ICD-9-CM   1. Impaired mobility [Z74.09]  Z74.09 799.89   2. Aneurysm of the ascending aorta, without rupture  I71.21 441.2     Patient Active Problem List   Diagnosis    Aneurysm of the ascending aorta, without rupture    Chest pain    Dyslipidemia    Tobacco use    Overweight with body mass index (BMI) of 28 to 28.9 in adult    Aortic root aneurysm     Past Medical History:   Diagnosis Date    AAA (abdominal aortic aneurysm)     Anxiety     Arthritis     GERD (gastroesophageal reflux disease)     Hepatitis C     Hyperlipidemia     Hypertension     Pancreatitis     Seizure     from head injury    Substance abuse      Past Surgical History:   Procedure Laterality Date    ANKLE LIGAMENT RECONSTRUCTION Left 2015    ASCENDING ARCH/HEMIARCH REPLACEMENT N/A 2024    Procedure: AORTIC ROOT REPLACEMENT, ASCENDING AORTIC HEMIARCH REPLACEMENT, AORTIC VALVE REPLACEMENT WITH MECHANICAL VALVE WITH CIRCULATORY ARREST, TRANSESOPHAGEAL ECHOCARDIOGRAM;  Surgeon: Sandeep Duran MD;  Location: [facility] OR;  Service: Cardiothoracic;  Laterality: N/A;    CARDIAC CATHETERIZATION N/A 2023    Procedure: Left Heart Cath;  Surgeon: Nikolas Ramos MD;  Location:  [facility];  Service: Cardiology;  Laterality: N/A;    TOOTH EXTRACTION      Full mouth extraction     PT Assessment (Last 12 Hours)       PT Evaluation and Treatment       Row Name 24 0905          Physical Therapy Time and Intention    Subjective Information complains of;pain  -MF     Document Type therapy note (daily note)  -     Mode of Treatment physical therapy  -     Comment pt will be reminded of sternal precautions and the very next moment use his arms to stand or sit down. Pt. voices understanding, but does not demonstrate that he understands.  -MF       Row  Name 04/22/24 0905          General Information    Existing Precautions/Restrictions fall;sternal  -     Limitations/Impairments safety/cognitive  -       Row Name 04/22/24 0905          Pain    Pretreatment Pain Rating 6/10  -     Posttreatment Pain Rating 8/10  -     Pain Location incisional  -     Pain Location - chest  -     Pre/Posttreatment Pain Comment 8/10 with coughing  -     Pain Intervention(s) Repositioned;Ambulation/increased activity  -       Row Name 04/22/24 0905          Transfers    Comment, (Transfers) practiced sit to stand x 5 cues for sternal precautions  -       Row Name 04/22/24 0905          Sit-Stand Transfer    Sit-Stand [place] (Transfers) independent  -       Row Name 04/22/24 0905          Stand-Sit Transfer    Stand-Sit [place] (Transfers) independent  -       Row Name 04/22/24 0905          Gait/Stairs (Locomotion)    [place] Level (Gait) supervision  -     Distance in Feet (Gait) 600  1 standing rest  -     Deviations/Abnormal Patterns (Gait) patricia decreased  -       Row Name 04/22/24 0905          Motor Skills    Comments, Therapeutic Exercise cardiac warm ups x 15  -       Row Name             Wound 04/17/24 0809 sternal Incision    Wound - Properties Group Placement Date: 04/17/24  - Placement Time: 0809  -LH Present on Original Admission: N  -LH Location: sternal  -LH Primary Wound Type: Incision  -LH    Retired Wound - Properties Group Placement Date: 04/17/24  -LH Placement Time: 0809  -LH Present on Original Admission: N  -LH Location: sternal  -LH Primary Wound Type: Incision  -LH    Retired Wound - Properties Group Date first assessed: 04/17/24  - Time first assessed: 0809  -LH Present on Original Admission: N  -LH Location: sternal  -LH Primary Wound Type: Incision  -LH      Row Name             Wound 04/21/24 1130 Left Abrasion    Wound - Properties Group Placement Date: 04/21/24  -AG Placement Time: 1130  -AG Present on  Original Admission: N  -AG Side: Left  -AG Primary Wound Type: Abrasion  -AG    Retired Wound - Properties Group Placement Date: 04/21/24  -AG Placement Time: 1130  -AG Present on Original Admission: N  -AG Side: Left  -AG Primary Wound Type: Abrasion  -AG    Retired Wound - Properties Group Date first assessed: 04/21/24  -AG Time first assessed: 1130  -AG Present on Original Admission: N  -AG Side: Left  -AG Primary Wound Type: Abrasion  -AG      Row Name 04/22/24 0905          Plan of Care Review    Plan of Care Reviewed With patient  -MF     Progress no change  -     Outcome Evaluation Pt. agreeable to therapy. He rates his pain 6-7/10 all over. He continues to over use his arms for transfers and shifting his weight in chair. Despite education and reminders, he continues to do use this arms. When  pt does follow this instructions, he is Independent with transfers. He walked 600' with 1 standing rest-with Supervision. O2 sat was 91-93% while on RA. Educated pt on the benefits of walking and activity following this surgery.  -       Row Name 04/22/24 0905          Vital Signs    Intra SpO2 (%) 91  -MF     O2 Delivery Intra Treatment room air  -MF     Post SpO2 (%) 93  -MF     O2 Delivery Post Treatment room air  -       Row Name 04/22/24 0905          Positioning and Restraints    Pre-Treatment Position sitting in chair/recliner  -MF     Post Treatment Position chair  -MF     In Chair reclined;call light within reach;encouraged to call for assist;exit alarm on  -               User Key  (r) = Recorded By, (t) = Taken By, (c) = Cosigned By      Initials Name Provider Type    Sofi Serrano RN Registered Nurse    Rissa Colon, DOUG Physical Therapist Assistant    Marge Tinajero RN Registered Nurse                    Physical Therapy Education       Title: PT OT SLP Therapies (In Progress)       Topic: Physical Therapy (In Progress)       Point: Mobility training (Done)       Learning  Progress Summary             Patient Acceptance, E,D, DU,VU by ALFREDO at 4/21/2024 1139    Comment: bed transfers    Acceptance, E, VU by ALONA at 4/18/2024 0853    Comment: safe mobility, fall risk, sternal precautions                         Point: Home exercise program (Done)       Learning Progress Summary             Patient Acceptance, E, VU by ALONA at 4/18/2024 0853    Comment: safe mobility, fall risk, sternal precautions                         Point: Body mechanics (Done)       Learning Progress Summary             Patient Acceptance, E, VU by ALONA at 4/18/2024 0853    Comment: safe mobility, fall risk, sternal precautions                         Point: Precautions (In Progress)       Learning Progress Summary             Patient Acceptance, E,D, NR by ALFREDO at 4/20/2024 1142    Comment: sternal restrictions    Acceptance, E, VU by ALONA at 4/18/2024 0853    Comment: safe mobility, fall risk, sternal precautions                                         User Key       Initials Effective Dates Name Provider Type Discipline     02/03/23 -  Ellen Huggins, PTA Physical Therapist Assistant PT    ALONA 02/22/24 -  Sandeep Person, CARLITA Student PT Student PT                  PT Recommendation and Plan     Plan of Care Reviewed With: patient  Progress: no change  Outcome Evaluation: Pt. agreeable to therapy. He rates his pain 6-7/10 all over. He continues to over use his arms for transfers and shifting his weight in chair. Despite education and reminders, he continues to do use this arms. When  pt does follow this instructions, he is Independent with transfers. He walked 600' with 1 standing rest-with Supervision. O2 sat was 91-93% while on RA. Educated pt on the benefits of walking and activity following this surgery.   Outcome Measures       Row Name 04/22/24 0905 04/21/24 1100 04/20/24 1100       How much help from another person do you currently need...    Turning from your back to your side while in flat bed without using  bedrails? 4  -MF 4  -ALFREDO 4  -ALFREDO    Moving from lying on back to sitting on the side of a flat bed without bedrails? 3  -MF 4  -ALFREDO 3  -ALFREDO    Moving to and from a bed to a chair (including a wheelchair)? 4  -MF 4  -ALFREDO 3  -ALFREDO    Standing up from a chair using your arms (e.g., wheelchair, bedside chair)? 4  -MF 4  -ALFREDO 3  -ALFREDO    Climbing 3-5 steps with a railing? 3  -MF 4  -ALFREDO 3  -ALFREDO    To walk in hospital room? 4  -MF 4  -ALFREDO 3  -ALFREDO    AM-PAC 6 Clicks Score (PT) 22  -MF 24  -ALFREDO 19  -ALFREDO    Highest Level of Mobility Goal 7 --> Walk 25 feet or more  -MF 8 --> Walked 250 feet or more  -ALFREDO 6 --> Walk 10 steps or more  -ALFREDO       Functional Assessment    Outcome Measure Options AM-PAC 6 Clicks Basic Mobility (PT)  - -- --              User Key  (r) = Recorded By, (t) = Taken By, (c) = Cosigned By      Initials Name Provider Type    Ellen Leslie PTA Physical Therapist Assistant     Rissa Alva PTA Physical Therapist Assistant                     Time Calculation:    PT Charges       Row Name 04/22/24 1031             Time Calculation    Start Time 0905  -      Stop Time 0930  -      Time Calculation (min) 25 min  -      PT Received On 04/22/24  -         Time Calculation- PT    Total Timed Code Minutes- PT 25 minute(s)  -MF         Timed Charges    93890 - Gait Training Minutes  25  -MF         Total Minutes    Timed Charges Total Minutes 25  -MF       Total Minutes 25  -MF                User Key  (r) = Recorded By, (t) = Taken By, (c) = Cosigned By      Initials Name Provider Type    Rissa Colon PTA Physical Therapist Assistant                  Therapy Charges for Today       Code Description Service Date Service Provider Modifiers Qty    36507693604 HC GAIT TRAINING EA 15 MIN 4/22/2024 Rissa Alva PTA GP 2            PT G-Codes  Outcome Measure Options: AM-PAC 6 Clicks Basic Mobility (PT)  AM-PAC 6 Clicks Score (PT): 22    Rissa Alva PTA  4/22/2024

## 2024-04-22 NOTE — CASE MANAGEMENT/SOCIAL WORK
Continued Stay Note   [facility]     Patient Name: Nic Castro  MRN: [id number]  Today's Date: 4/22/2024    Admit Date: 4/17/2024    Plan: Personal Care Referrals   Discharge Plan       Row Name 04/22/24 1107       Plan    Plan Personal Care Referrals    Plan Comments APRN would like SW to refer patient to Personal Care facilities. Patient will need to give up any income to the personal care home if he is accepted but will have personal care homes discuss this with patient if any facilities are interested. LOUISA [facility], [facility], [facility], and [facility]. Will follow for decisions from each  home.                 ANNA Benjamin

## 2024-04-22 NOTE — CASE MANAGEMENT/SOCIAL WORK
Continued Stay Note  SAVANNAH Puente     Patient Name: Nic Castro  MRN: [id number]  Today's Date: 4/22/2024    Admit Date: 4/17/2024    Plan: TBD   Discharge Plan       Row Name 04/22/24 1011       Plan    Plan TBD    Plan Comments Spoke to Shira diane [facility] and she advised that patient does not meet level of care requirements. Patient will not qualify for SNF. Patient's wife says that patient cannot come to stay with her. Patient's nurse says she will touch base with patient's nephew. If patient cannot go to nephew's home, only other option is a homeless shelter and SW can provide patient with information on [facility] (closest shelter option).               ANNA Benjamin

## 2024-04-23 ENCOUNTER — APPOINTMENT (OUTPATIENT)
Dept: GENERAL RADIOLOGY | Facility: HOSPITAL | Age: 49
DRG: 221 | End: 2024-04-23
Payer: MEDICAID

## 2024-04-23 ENCOUNTER — READMISSION MANAGEMENT (OUTPATIENT)
Dept: CALL CENTER | Facility: HOSPITAL | Age: 49
End: 2024-04-23
Payer: MEDICAID

## 2024-04-23 VITALS
HEART RATE: 89 BPM | SYSTOLIC BLOOD PRESSURE: 104 MMHG | WEIGHT: 196.8 LBS | HEIGHT: 71 IN | BODY MASS INDEX: 27.55 KG/M2 | DIASTOLIC BLOOD PRESSURE: 57 MMHG | TEMPERATURE: 98 F | OXYGEN SATURATION: 95 % | RESPIRATION RATE: 18 BRPM

## 2024-04-23 DIAGNOSIS — I71.21 ANEURYSM OF THE ASCENDING AORTA, WITHOUT RUPTURE: ICD-10-CM

## 2024-04-23 PROBLEM — Z79.01 ANTICOAGULATED ON COUMADIN: Status: ACTIVE | Noted: 2024-04-23

## 2024-04-23 LAB
ANION GAP SERPL CALCULATED.3IONS-SCNC: 10 MMOL/L (ref 5–15)
ANION GAP SERPL CALCULATED.3IONS-SCNC: 11 MMOL/L (ref 5–15)
BUN SERPL-MCNC: 12 MG/DL (ref 6–20)
BUN SERPL-MCNC: 12 MG/DL (ref 6–20)
BUN/CREAT SERPL: 21.4 (ref 7–25)
BUN/CREAT SERPL: 22.2 (ref 7–25)
CALCIUM SPEC-SCNC: 8.4 MG/DL (ref 8.6–10.5)
CALCIUM SPEC-SCNC: 8.7 MG/DL (ref 8.6–10.5)
CHLORIDE SERPL-SCNC: 100 MMOL/L (ref 98–107)
CHLORIDE SERPL-SCNC: 102 MMOL/L (ref 98–107)
CO2 SERPL-SCNC: 24 MMOL/L (ref 22–29)
CO2 SERPL-SCNC: 26 MMOL/L (ref 22–29)
CREAT SERPL-MCNC: 0.54 MG/DL (ref 0.76–1.27)
CREAT SERPL-MCNC: 0.56 MG/DL (ref 0.76–1.27)
DEPRECATED RDW RBC AUTO: 44.5 FL (ref 37–54)
DEPRECATED RDW RBC AUTO: 44.9 FL (ref 37–54)
EGFRCR SERPLBLD CKD-EPI 2021: 120.1 ML/MIN/1.73
EGFRCR SERPLBLD CKD-EPI 2021: 121.4 ML/MIN/1.73
ERYTHROCYTE [DISTWIDTH] IN BLOOD BY AUTOMATED COUNT: 14.4 % (ref 12.3–15.4)
ERYTHROCYTE [DISTWIDTH] IN BLOOD BY AUTOMATED COUNT: 14.6 % (ref 12.3–15.4)
GLUCOSE SERPL-MCNC: 109 MG/DL (ref 65–99)
GLUCOSE SERPL-MCNC: 111 MG/DL (ref 65–99)
HCT VFR BLD AUTO: 24.5 % (ref 37.5–51)
HCT VFR BLD AUTO: 26.7 % (ref 37.5–51)
HGB BLD-MCNC: 7.9 G/DL (ref 13–17.7)
HGB BLD-MCNC: 8.5 G/DL (ref 13–17.7)
INR PPP: 2.57 (ref 0.91–1.09)
MCH RBC QN AUTO: 27.1 PG (ref 26.6–33)
MCH RBC QN AUTO: 27.2 PG (ref 26.6–33)
MCHC RBC AUTO-ENTMCNC: 31.8 G/DL (ref 31.5–35.7)
MCHC RBC AUTO-ENTMCNC: 32.2 G/DL (ref 31.5–35.7)
MCV RBC AUTO: 84.2 FL (ref 79–97)
MCV RBC AUTO: 85.6 FL (ref 79–97)
PLATELET # BLD AUTO: 183 10*3/MM3 (ref 140–450)
PLATELET # BLD AUTO: 213 10*3/MM3 (ref 140–450)
PMV BLD AUTO: 10 FL (ref 6–12)
PMV BLD AUTO: 10.1 FL (ref 6–12)
POTASSIUM SERPL-SCNC: 4 MMOL/L (ref 3.5–5.2)
POTASSIUM SERPL-SCNC: 4 MMOL/L (ref 3.5–5.2)
PROTHROMBIN TIME: 28.6 SECONDS (ref 11.8–14.8)
RBC # BLD AUTO: 2.91 10*6/MM3 (ref 4.14–5.8)
RBC # BLD AUTO: 3.12 10*6/MM3 (ref 4.14–5.8)
SODIUM SERPL-SCNC: 136 MMOL/L (ref 136–145)
SODIUM SERPL-SCNC: 137 MMOL/L (ref 136–145)
WBC NRBC COR # BLD AUTO: 12.8 10*3/MM3 (ref 3.4–10.8)
WBC NRBC COR # BLD AUTO: 13.13 10*3/MM3 (ref 3.4–10.8)

## 2024-04-23 PROCEDURE — 97116 GAIT TRAINING THERAPY: CPT

## 2024-04-23 PROCEDURE — 85027 COMPLETE CBC AUTOMATED: CPT | Performed by: NURSE PRACTITIONER

## 2024-04-23 PROCEDURE — 85027 COMPLETE CBC AUTOMATED: CPT | Performed by: SURGERY

## 2024-04-23 PROCEDURE — 80048 BASIC METABOLIC PNL TOTAL CA: CPT | Performed by: SURGERY

## 2024-04-23 PROCEDURE — 85610 PROTHROMBIN TIME: CPT | Performed by: NURSE PRACTITIONER

## 2024-04-23 PROCEDURE — 97530 THERAPEUTIC ACTIVITIES: CPT

## 2024-04-23 PROCEDURE — 80048 BASIC METABOLIC PNL TOTAL CA: CPT | Performed by: NURSE PRACTITIONER

## 2024-04-23 PROCEDURE — 25010000002 ENOXAPARIN PER 10 MG: Performed by: THORACIC SURGERY (CARDIOTHORACIC VASCULAR SURGERY)

## 2024-04-23 PROCEDURE — 71045 X-RAY EXAM CHEST 1 VIEW: CPT

## 2024-04-23 PROCEDURE — 25010000002 FUROSEMIDE PER 20 MG: Performed by: NURSE PRACTITIONER

## 2024-04-23 RX ORDER — TRAMADOL HYDROCHLORIDE 50 MG/1
50 TABLET ORAL EVERY 6 HOURS PRN
Qty: 25 TABLET | Refills: 0 | Status: SHIPPED | OUTPATIENT
Start: 2024-04-23

## 2024-04-23 RX ORDER — TRAMADOL HYDROCHLORIDE 50 MG/1
50 TABLET ORAL EVERY 6 HOURS PRN
Qty: 25 TABLET | Refills: 0 | Status: SHIPPED | OUTPATIENT
Start: 2024-04-23 | End: 2024-04-23 | Stop reason: SDUPTHER

## 2024-04-23 RX ORDER — PANTOPRAZOLE SODIUM 40 MG/1
40 TABLET, DELAYED RELEASE ORAL
Qty: 30 TABLET | Refills: 0 | Status: SHIPPED | OUTPATIENT
Start: 2024-04-24

## 2024-04-23 RX ORDER — FUROSEMIDE 20 MG/1
20 TABLET ORAL DAILY
Qty: 5 TABLET | Refills: 0 | Status: SHIPPED | OUTPATIENT
Start: 2024-04-23 | End: 2024-04-28

## 2024-04-23 RX ORDER — WARFARIN SODIUM 6 MG/1
3 TABLET ORAL NIGHTLY
Qty: 15 TABLET | Refills: 0 | Status: SHIPPED | OUTPATIENT
Start: 2024-04-23

## 2024-04-23 RX ORDER — IRON POLYSACCHARIDE COMPLEX 150 MG
150 CAPSULE ORAL DAILY
Qty: 30 CAPSULE | Refills: 0 | Status: SHIPPED | OUTPATIENT
Start: 2024-04-24

## 2024-04-23 RX ORDER — NALOXONE HYDROCHLORIDE 4 MG/.1ML
SPRAY NASAL
Qty: 2 EACH | Refills: 0 | Status: SHIPPED | OUTPATIENT
Start: 2024-04-23

## 2024-04-23 RX ORDER — TRAMADOL HYDROCHLORIDE 50 MG/1
50 TABLET ORAL EVERY 6 HOURS PRN
Status: DISCONTINUED | OUTPATIENT
Start: 2024-04-23 | End: 2024-04-23 | Stop reason: HOSPADM

## 2024-04-23 RX ORDER — POTASSIUM CHLORIDE 750 MG/1
10 CAPSULE, EXTENDED RELEASE ORAL DAILY
Qty: 5 CAPSULE | Refills: 0 | Status: SHIPPED | OUTPATIENT
Start: 2024-04-23 | End: 2024-04-28

## 2024-04-23 RX ADMIN — ASPIRIN 81 MG: 81 TABLET, COATED ORAL at 09:22

## 2024-04-23 RX ADMIN — GUAIFENESIN 1200 MG: 600 TABLET, EXTENDED RELEASE ORAL at 09:22

## 2024-04-23 RX ADMIN — METHOCARBAMOL 500 MG: 500 TABLET, FILM COATED ORAL at 13:51

## 2024-04-23 RX ADMIN — FUROSEMIDE 20 MG: 10 INJECTION, SOLUTION INTRAMUSCULAR; INTRAVENOUS at 09:23

## 2024-04-23 RX ADMIN — ACETAMINOPHEN 650 MG: 325 TABLET, FILM COATED ORAL at 11:36

## 2024-04-23 RX ADMIN — POTASSIUM CHLORIDE 20 MEQ: 750 CAPSULE, EXTENDED RELEASE ORAL at 09:22

## 2024-04-23 RX ADMIN — SERTRALINE HYDROCHLORIDE 200 MG: 100 TABLET, FILM COATED ORAL at 09:20

## 2024-04-23 RX ADMIN — Medication 150 MG: at 09:20

## 2024-04-23 RX ADMIN — METOPROLOL TARTRATE 25 MG: 50 TABLET, FILM COATED ORAL at 09:28

## 2024-04-23 RX ADMIN — METHOCARBAMOL 500 MG: 500 TABLET, FILM COATED ORAL at 05:44

## 2024-04-23 RX ADMIN — ENOXAPARIN SODIUM 40 MG: 100 INJECTION SUBCUTANEOUS at 09:23

## 2024-04-23 RX ADMIN — OXYCODONE HYDROCHLORIDE 5 MG: 5 TABLET ORAL at 03:03

## 2024-04-23 RX ADMIN — LIDOCAINE 2 PATCH: 4 PATCH TOPICAL at 09:21

## 2024-04-23 RX ADMIN — ACETAMINOPHEN 650 MG: 325 TABLET, FILM COATED ORAL at 05:44

## 2024-04-23 RX ADMIN — PANTOPRAZOLE SODIUM 40 MG: 40 TABLET, DELAYED RELEASE ORAL at 05:44

## 2024-04-23 RX ADMIN — TRAMADOL HYDROCHLORIDE 50 MG: 50 TABLET ORAL at 13:51

## 2024-04-23 RX ADMIN — ARIPIPRAZOLE 5 MG: 5 TABLET ORAL at 09:20

## 2024-04-23 NOTE — CASE MANAGEMENT/SOCIAL WORK
Continued Stay Note  [facility]     Patient Name: Nic Castro  MRN: [id number]  Today's Date: 4/23/2024    Admit Date: 4/17/2024    Plan: Personal Care Referrals   Discharge Plan       Row Name 04/23/24 0936       Plan    Plan Personal Care Referrals    Plan Comments [facility] and [facility] (PC beds) are full. Spoke to Chanelle at [facility] and she says referral is under review. Called [facility] but did not reach a human or an answering machine, sent an email to email provided. Brittany Kelly in [place] will not have a decision until 9:30AM on Wednesday so was told to call back then. New referrals sent to [facility], [facility], [facility] ([facility] and [facility]. Will continue to work toward PC placement.               ANNA Benjamin

## 2024-04-23 NOTE — PAYOR COMM NOTE
"4/23/24 [facility] [phone]  [phone]    FAXING UPDATE CLINICAL 4/23/24.            Raj Castro (48 y.o. Male)       Date of Birth   1975    Social Security Number       Address   [address] [facility] [address]    Home Phone   [phone]    MRN   [id number]       Voodoo   Other    Marital Status                               Admission Date   4/17/24    Admission Type   Elective    Admitting Provider   Sandeep Duran MD    Attending Provider   Sandeep Duran MD    Department, Room/Bed   [address], 435/1       Discharge Date       Discharge Disposition       Discharge Destination                                 Attending Provider: Sandeep Duran MD    Allergies: No Known Allergies    Isolation: None   Infection: None   Code Status: CPR    Ht: 180.3 cm (71\")   Wt: 89.3 kg (196 lb 12.8 oz)    Admission Cmt: None   Principal Problem: Aneurysm of the ascending aorta, without rupture [I71.21]                   Active Insurance as of 4/17/2024       Primary Coverage       Payor Plan Insurance Group Employer/Plan Group    [place] [id number]       Payor Plan Address Payor Plan Phone Number Payor Plan Fax Number Effective Dates    [employer] [address] [address] [phone]  3/1/2024 - None Entered    [facility] [address]         Subscriber Name Subscriber Birth Date Member ID       RAJ CASTRO 1975 [id number]                     Emergency Contacts        (Rel.) Home Phone Work Phone Mobile Phone    Lubna Castro (Spouse) [phone] -- [phone]           [facility] Encounter Date/Time: 4/17/2024 [facility]   Hospital Account: [id number]    MRN: [id number]   Patient:  Raj Castro   Contact Serial #: [id number]   SSN:          ENCOUNTER             Patient Class: Inpatient   Unit: [address] Service: Cardiothoracic Surgery     Bed: 435/1   Admitting " Provider: Sandeep Duran MD   Referring Physician: Sandeep Duran   Attending Provider: Sandeep Duran MD   Adm Diagnosis: Aneurysm of the ascendin*               PATIENT             Name: Raj Castro : 1975 (48 yrs)   Address: [address] Sex: Male   City: [place] [address]   County: [facility]   Marital Status:  Ethnicity: NOT                                                                         Race: WHITE   Primary Care Provider: Provider, No Known Patients Phone: Home Phone: [phone]     Mobile Phone: [phone]     EMERGENCY CONTACT   Contact Name Legal Guardian? Relationship to Patient Home Phone Work Phone Mobile Phone   1. MatthewLubna montelongo  2. *No Contact Specified* No    Spouse    [phone]      GUARANTOR             Guarantor: Raj Castro     : 1975   Address: [address] Sex: Male     [place] [id number]     Relation to Patient: Self       Home Phone: [phone]   Guarantor ID: [id number]       Work Phone:     GUARANTOR EMPLOYER   Employer:           Status: DISABLED   COVERAGE          PRIMARY INSURANCE   [place] Plan[place]   Group Number: [id number] Insurance Type: INDEMNITY   Subscriber Name: RAJ CASTRO Subscriber : 1975   Subscriber ID: [id number] Coverage Address: [place] [address]   Pat. Rel. to Subscriber: Self Coverage Phone: [phone]   SECONDARY INSURANCE   Payor: N/A Plan: N/A   Group Number:   Insurance Type:     Subscriber Name:   Subscriber :     Subscriber ID:   Coverage Address:     Pat. Rel. to Subscriber:   Coverage Phone:        Contact Serial # ([id number])         2024    Chart ID ([id number])            Pizano, Mehnaz, APRN   Nurse Practitioner  Cardiothoracic Surgery     Progress Notes      Cosign Needed     Date of Service: 24  Creation Time: 24     Cosign Needed       Expand  "All Collapse All    Patient name: Nic Castro  Patient : 1975  VISIT # [id number]  MR #[id number]     Procedure:Procedure(s):  AORTIC ROOT REPLACEMENT, ASCENDING AORTIC HEMIARCH REPLACEMENT, AORTIC VALVE REPLACEMENT WITH MECHANICAL VALVE WITH CIRCULATORY ARREST, TRANSESOPHAGEAL ECHOCARDIOGRAM  Procedure Date:2024  POD:6 Days Post-Op        Subjective  Patient is on room air.  Weight is down 1 pound today and he is 3 pounds below his baseline.  INR this morning is 2.5, hemoglobin 7.9.  He is walking 600 feet and having bowel movements.  Discharge planning in process this patient has no family support or anyone to stay with at discharge.  He continues to violate sternal precautions and nursing reports confusion overnight     Telemetry: Sinus rhythm 83-94  IV drips: None              Objective[]Expand by Default  Visit Vitals  /57 (BP Location: Left arm, Patient Position: Sitting)   Pulse 89   Temp 98 °F (36.7 °C) (Oral)   Resp 18   Ht 180.3 cm (71\")   Wt 89.3 kg (196 lb 12.8 oz)   SpO2 95%   BMI 27.45 kg/m²   Baseline weight 199 pounds     Intake/Output Summary (Last 24 hours) at 2024 0814  Last data filed at 2024 0600      Gross per 24 hour   Intake 360 ml   Output 1000 ml   Net -640 ml         Lab:     CBC:         Results from last 7 days   Lab Units 248 24  0429   WBC 10*3/mm3 12.80* 13.30* 15.82*   HEMATOCRIT % 24.5* 26.8* 28.2*   PLATELETS 10*3/mm3 183 164 146            BMP:          Results from last 7 days   Lab Units 248 24  1616 24  0429   SODIUM mmol/L 137 136  --  134*   POTASSIUM mmol/L 4.0 4.0 4.2 3.7   CHLORIDE mmol/L 102 102  --  98   CO2 mmol/L 24.0 26.0  --  26.0   GLUCOSE mg/dL 109* 102*  --  94   BUN mg/dL 12 16  --  17   CREATININE mg/dL 0.54* 0.62*  --  0.61*            COAG:         Results from last 7 days   Lab Units 24  0220 24  1336 24  1228   INR   2.57*   < > 1.50* "   APTT seconds  --   --  42.1*    < > = values in this interval not displayed.         IMAGES:       Imaging Results (Last 24 Hours)         Procedure Component Value Units Date/Time     XR Chest 1 View [[id number]] Collected: 04/23/24 0644       Updated: 04/23/24 0648     Narrative:       EXAMINATION: XR CHEST 1 VW-     4/23/2024 2:48 AM     HISTORY: Post-Op Heart Surgery     A frontal projection of the chest is compared with the previous study  dated 4/22/2024.     The lungs are moderately well-expanded.     There are persistent areas of consolidation in the upper lungs, left  larger than the right. No change.     There is no pleural effusion. There is no pneumothorax.     There is moderate cardiomegaly. There is evidence of previous cardiac  surgery.     There is no acute bony abnormality.        Impression:       1. No significant change from the previous study 1 day ago.              This report was signed and finalized on 4/23/2024 6:45 AM by Dr. Lizz Schaffer MD.        CT Angiogram Chest [[id number]] Collected: 04/22/24 1218       Updated: 04/22/24 1230     Narrative:       CT ANGIOGRAM CHEST- 4/22/2024 10:20 AM     HISTORY: Aortic aneurysm, known or suspected      COMPARISON: 1/4/2024.     DOSE LENGTH PRODUCT:  678.08 mGy.cm. Automated exposure control was also  utilized to decrease patient radiation dose.     TECHNIQUE: Helical tomographic images of the chest were obtained after  the administration of intravenous contrast following angiogram protocol.  Additionally, 3D MIP reconstructions in the coronal and sagittal planes  were provided.       FINDINGS:       AORTA:     AORTIC ROOT: 3.2 cm. Measured on coronal reformatted images.     ASCENDING AORTA: 2.5 cm. Measured on coronal reformatted images.     AORTIC ARCH: 2.5 cm. Measured on sagittal reformatted images.     DESCENDING AORTA: 2.3 cm. Measured on sagittal reformatted images.     GREAT VESSELS: The visualized portions of the great vessels are  patent  without significant abnormality. There is calcification at both carotid  bulbs and the proximal left internal carotid artery but no severe  stenosis is visualized.     MEDIASTINUM: Right paratracheal lymph node measures 1 cm in short axis  and may be reactive. Patient is status post recent aortic valve  replacement and ascending aorta replacement. Postoperative gas is noted  in the mediastinum. There is coronary artery calcification which is mild  to moderate. Pericardial calcification is stable. No pericardial  effusion is seen. There is normal right pleural effusion and associated  atelectasis.      PULMONARY ARTERIES: No filling defects are visualized.     LUNGS AND AIRWAYS: There are patchy bilateral air predominantly upper  lobe groundglass opacity likely due to edema. There is no     UPPER ABDOMEN: There is a nonobstructing stone at the left kidney  measuring 0.8 cm.     SOFT TISSUE: There are postoperative changes anteriorly.     BONE: No suspicious osseous lesion identified.           Impression:       1. Postoperative chest status post mechanical aortic valve replacement  and ascending aortic arch replacement. There are expected post changes.  2. Small right effusion and atelectasis and patchy groundglass opacities  at the upper lobes likely edema.        This report was signed and finalized on 4/22/2024 12:27 PM by Harsh Larsen.                CXR: Ongoing bilateral upper lobe opacities.  No pneumothorax.     Physical Exam:  General: Alert, oriented. No apparent distress.   Cardiovascular: Regular rate and rhythm without murmur, rubs, or gallops.    Pulmonary: Diminished bilaterally without wheezing, rubs, or rales.  Chest: Sternotomy incision clean, dry, and intact. Mild serosanguineous drainage from the lower sternal incision.  Edges are well-approximated.  No purulent drainage or erythema.  Sternum stable. No clicks.    Abdomen: Soft, nondistended, and nontender.  Extremities: Warm, moves  all extremities.  Mild lower extremity edema.  Neurologic:  Grossly intact with no focal deficits.                 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  Daily PT/INR.  Goal INR is 2-3  Continue diuresis  Repeat labs this afternoon, if hemoglobin continues to drop, will transfuse 1 unit PRBC  Discontinue oxycodone and Lyrica, tramadol as needed for pain given confusion  Encourage pulmonary toilet and ambulation  Routine postcardiac surgery care  Discharge planning.  Patient states he has no way to get a hold of his nephew.  Social work is working on discharge placement and referrals have been sent to multiple facilities.  May need to discharge to [facility] which is the closest shelter.  Patient will need transportation to and from office visits and will need frequent lab draws to monitor PT/INR.  Discussed with patient and nursing              ISABEL Yin  04/23/24  08:14 CDT                   Michelle Gale RN   Registered Nurse  Nursing     Plan of Care     Addendum     Date of Service: 04/23/24 0445  Creation Time: 04/23/24 0445       Goal Outcome Evaluation:  Plan of Care Reviewed With: patient  Progress: improving  Outcome Evaluation: Pt has continued to be disoriented throughout the night. Pt is able to answer questions appropriately but is forgetful, difficult to redirect and does not follow directions without repeating them. Pt reminded multiple times throughout the night about proper sternal precautions but continues to use arms at times when moving. Pt up unassissted multiple times during the night. Bed/chair alarm was utilized for pt safety. Pt c/o minimal discomfort through the night, requiring PRN medication once. Pt s 83-94 on the monitor with 3.45 sec of PAT up to 160. Call light has remained within reach, alarms activated,  nonskid footwear in place. Pt distal portion of incision noted to have small amount of drainage, pt again strongly encouraged to follow proper sternal precautions.            e Temp Pulse Resp BP Patient Position Device (Oxygen Therapy) Flow (L/min) SpO2   04/23/24 0808 98 (36.7) 89 18 104/57 Sitting room air -- 95   04/23/24 0752 -- -- -- -- -- room air -- --   04/23/24 0325 98.5 (36.9) 90 18 112/62 Lying room air -- 93   04/22/24 2336 99.1 (37.3) 100 18 119/56 Sitting room air -- 97   04/22/24 2120 -- -- -- -- -- room air -- --   04/22/24 1941 98.2 (36.8) 84 18 109/52 Lying room air -- 95   04/22/24 1657 98.4 (36.9) 90 18 97/52 Sitting room air -- 95   04/22/24 1237 98.4 (36.9) 89 18 115/55 -- room air -- 95   04/22/24 0827 98.8 (37.1) 87 18 126/53 Sitting room air -- 94   04/22/24 0747 -- -- -- -- -- room air -- --   04/22/24 0416 98.9 (37.2) 91 18 116/50 Lying room air -- --   04/21/24 230                       Current Facility-Administered Medications   Medication Dose Route Frequency Provider Last Rate Last Admin    acetaminophen (TYLENOL) tablet 650 mg  650 mg Oral Q6H Sandeep Duran MD   650 mg at 04/23/24 0544    ARIPiprazole (ABILIFY) tablet 5 mg  5 mg Oral Daily Sandeep Duran MD   5 mg at 04/23/24 0920    aspirin EC tablet 81 mg  81 mg Oral Daily Sandeep Duran MD   81 mg at 04/23/24 0922    atorvastatin (LIPITOR) tablet 20 mg  20 mg Oral Nightly Sandeep Duran MD   20 mg at 04/22/24 2116    bisacodyl (DULCOLAX) EC tablet 10 mg  10 mg Oral BID Sandeep Duran MD   10 mg at 04/22/24 0932    Calcium Replacement - Follow Nurse / BPA Driven Protocol   Does not apply PRN Sandeep Duran MD        Enoxaparin Sodium (LOVENOX) syringe 40 mg  40 mg Subcutaneous Daily Yunier De Santiago MD   40 mg at 04/23/24 0923    furosemide (LASIX) injection 20 mg  20 mg Intravenous BID Mehnaz Pizano APRN   20 mg at 04/23/24 0923    guaiFENesin (MUCINEX) 12 hr tablet 1,200 mg  1,200 mg Oral Q12H Liliana Aguilar, APRN    1,200 mg at 04/23/24 0922    iron polysaccharides (NIFEREX) capsule 150 mg  150 mg Oral Daily Mehnaz Pizano APRN   150 mg at 04/23/24 0920    Lidocaine 4 % 2 patch  2 patch Transdermal Q24H Liliana Aguilar APRN   2 patch at 04/23/24 0921    Magnesium Cardiology Dose Replacement - Follow Nurse / BPA Driven Protocol   Does not apply PRN Sandeep Duran MD        methocarbamol (ROBAXIN) tablet 500 mg  500 mg Oral Q8H Mehnaz Pizano APRN   500 mg at 04/23/24 0544    metoprolol tartrate (LOPRESSOR) tablet 25 mg  25 mg Oral Q12H Yunier De Santiago MD   25 mg at 04/23/24 0928    nitroglycerin (NITROSTAT) SL tablet 0.4 mg  0.4 mg Sublingual Q5 Min PRN Sandeep Duran MD        ondansetron (ZOFRAN) injection 4 mg  4 mg Intravenous Q6H PRN Sandeep Duran MD        oxyCODONE (ROXICODONE) immediate release tablet 5 mg  5 mg Oral Q4H PRN Sandeep Duran MD   5 mg at 04/23/24 0303    pantoprazole (PROTONIX) EC tablet 40 mg  40 mg Oral Q AM Mehnaz Pizano APRN   40 mg at 04/23/24 0544    Phosphorus Replacement - Follow Nurse / BPA Driven Protocol   Does not apply PRN Sandeep Duran MD        polyethylene glycol (MIRALAX) packet 17 g  17 g Oral Daily Sandeep Duran MD   17 g at 04/22/24 0934    potassium chloride (MICRO-K/KLOR-CON) CR capsule  20 mEq Oral BID With Meals Mehnaz Pizano APRN   20 mEq at 04/23/24 0922    Potassium Replacement - Follow Nurse / BPA Driven Protocol   Does not apply PRN Sandeep Duran MD        QUEtiapine (SEROquel) tablet 100 mg  100 mg Oral Nightly Roger, Sandeep HECTOR MD   100 mg at 04/22/24 2116    sertraline (ZOLOFT) tablet 200 mg  200 mg Oral Daily Sandeep Duran MD   200 mg at 04/23/24 0920    traMADol (ULTRAM) tablet 50 mg  50 mg Oral Q6H PRN Mehnaz Pizano APRN        warfarin (COUMADIN) tablet 3 mg  3 mg Oral Daily Liliana Aguilar APRN   3 mg at 04/22/24 1723

## 2024-04-23 NOTE — PROGRESS NOTES
"Patient name: Nic Castro  Patient : 1975  VISIT # [id number]  MR #[id number]    Procedure:Procedure(s):  AORTIC ROOT REPLACEMENT, ASCENDING AORTIC HEMIARCH REPLACEMENT, AORTIC VALVE REPLACEMENT WITH MECHANICAL VALVE WITH CIRCULATORY ARREST, TRANSESOPHAGEAL ECHOCARDIOGRAM  Procedure Date:2024  POD:6 Days Post-Op    Subjective     Patient is on room air.  Weight is down 1 pound today and he is 3 pounds below his baseline.  INR this morning is 2.5, hemoglobin 7.9.  He is walking 600 feet and having bowel movements.  Discharge planning in process this patient has no family support or anyone to stay with at discharge.  He continues to violate sternal precautions and nursing reports confusion overnight    Telemetry: Sinus rhythm 83-94  IV drips: None       Objective     Visit Vitals  /57 (BP Location: Left arm, Patient Position: Sitting)   Pulse 89   Temp 98 °F (36.7 °C) (Oral)   Resp 18   Ht 180.3 cm (71\")   Wt 89.3 kg (196 lb 12.8 oz)   SpO2 95%   BMI 27.45 kg/m²   Baseline weight 199 pounds    Intake/Output Summary (Last 24 hours) at 2024 0814  Last data filed at 2024 0600  Gross per 24 hour   Intake 360 ml   Output 1000 ml   Net -640 ml       Lab:     CBC:  Results from last 7 days   Lab Units 24  0429   WBC 10*3/mm3 12.80* 13.30* 15.82*   HEMATOCRIT % 24.5* 26.8* 28.2*   PLATELETS 10*3/mm3 183 164 146          BMP:  Results from last 7 days   Lab Units 24  1616 24  0429   SODIUM mmol/L 137 136  --  134*   POTASSIUM mmol/L 4.0 4.0 4.2 3.7   CHLORIDE mmol/L 102 102  --  98   CO2 mmol/L 24.0 26.0  --  26.0   GLUCOSE mg/dL 109* 102*  --  94   BUN mg/dL 12 16  --  17   CREATININE mg/dL 0.54* 0.62*  --  0.61*          COAG:  Results from last 7 days   Lab Units 24/24  1336 24  1228   INR  2.57*   < > 1.50*   APTT seconds  --   --  42.1*    < > = values in this interval not displayed. "       IMAGES:       Imaging Results (Last 24 Hours)       Procedure Component Value Units Date/Time    XR Chest 1 View [[id number]] Collected: 04/23/24 0644     Updated: 04/23/24 0648    Narrative:      EXAMINATION: XR CHEST 1 VW-     4/23/2024 2:48 AM     HISTORY: Post-Op Heart Surgery     A frontal projection of the chest is compared with the previous study  dated 4/22/2024.     The lungs are moderately well-expanded.     There are persistent areas of consolidation in the upper lungs, left  larger than the right. No change.     There is no pleural effusion. There is no pneumothorax.     There is moderate cardiomegaly. There is evidence of previous cardiac  surgery.     There is no acute bony abnormality.       Impression:      1. No significant change from the previous study 1 day ago.              This report was signed and finalized on 4/23/2024 6:45 AM by Dr. Lizz Schaffer MD.       CT Angiogram Chest [[id number]] Collected: 04/22/24 1218     Updated: 04/22/24 1230    Narrative:      CT ANGIOGRAM CHEST- 4/22/2024 10:20 AM     HISTORY: Aortic aneurysm, known or suspected      COMPARISON: 1/4/2024.     DOSE LENGTH PRODUCT:  678.08 mGy.cm. Automated exposure control was also  utilized to decrease patient radiation dose.     TECHNIQUE: Helical tomographic images of the chest were obtained after  the administration of intravenous contrast following angiogram protocol.  Additionally, 3D MIP reconstructions in the coronal and sagittal planes  were provided.       FINDINGS:       AORTA:     AORTIC ROOT: 3.2 cm. Measured on coronal reformatted images.     ASCENDING AORTA: 2.5 cm. Measured on coronal reformatted images.     AORTIC ARCH: 2.5 cm. Measured on sagittal reformatted images.     DESCENDING AORTA: 2.3 cm. Measured on sagittal reformatted images.     GREAT VESSELS: The visualized portions of the great vessels are patent  without significant abnormality. There is calcification at both carotid  bulbs and the  proximal left internal carotid artery but no severe  stenosis is visualized.     MEDIASTINUM: Right paratracheal lymph node measures 1 cm in short axis  and may be reactive. Patient is status post recent aortic valve  replacement and ascending aorta replacement. Postoperative gas is noted  in the mediastinum. There is coronary artery calcification which is mild  to moderate. Pericardial calcification is stable. No pericardial  effusion is seen. There is normal right pleural effusion and associated  atelectasis.      PULMONARY ARTERIES: No filling defects are visualized.     LUNGS AND AIRWAYS: There are patchy bilateral air predominantly upper  lobe groundglass opacity likely due to edema. There is no     UPPER ABDOMEN: There is a nonobstructing stone at the left kidney  measuring 0.8 cm.     SOFT TISSUE: There are postoperative changes anteriorly.     BONE: No suspicious osseous lesion identified.          Impression:      1. Postoperative chest status post mechanical aortic valve replacement  and ascending aortic arch replacement. There are expected post changes.  2. Small right effusion and atelectasis and patchy groundglass opacities  at the upper lobes likely edema.        This report was signed and finalized on 4/22/2024 12:27 PM by Harsh Larsen.             CXR: Ongoing bilateral upper lobe opacities.  No pneumothorax.    Physical Exam:  General: Alert, oriented. No apparent distress.   Cardiovascular: Regular rate and rhythm without murmur, rubs, or gallops.    Pulmonary: Diminished bilaterally without wheezing, rubs, or rales.  Chest: Sternotomy incision clean, dry, and intact. Mild serosanguineous drainage from the lower sternal incision.  Edges are well-approximated.  No purulent drainage or erythema.  Sternum stable. No clicks.    Abdomen: Soft, nondistended, and nontender.  Extremities: Warm, moves all extremities.  Mild lower extremity edema.  Neurologic:  Grossly intact with no focal deficits.             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  Daily PT/INR.  Goal INR is 2-3  Continue diuresis  Repeat labs this afternoon, if hemoglobin continues to drop, will transfuse 1 unit PRBC  Discontinue oxycodone and Lyrica, tramadol as needed for pain given confusion  Encourage pulmonary toilet and ambulation  Routine postcardiac surgery care  Discharge planning.  Patient states he has no way to get a hold of his nephew.  Social work is working on discharge placement and referrals have been sent to multiple facilities.  May need to discharge to [facility] which is the closest shelter.  Patient will need transportation to and from office visits and will need frequent lab draws to monitor PT/INR.  Discussed with patient and nursing          ISABEL Yin  04/23/24  08:14 CDT

## 2024-04-23 NOTE — CASE MANAGEMENT/SOCIAL WORK
Continued Stay Note  SAVANNAH Puente     Patient Name: iNc Castro  MRN: [id number]  Today's Date: 4/23/2024    Admit Date: 4/17/2024    Plan: Home   Discharge Plan       Row Name 04/23/24 1243       Plan    Plan Home    Final Discharge Disposition Code 01 - home or self-care    Final Note Per chart, patient's wife now agrees to take patient to her home and provide assistance as needed.                 Expected Discharge Date and Time       Expected Discharge Date Expected Discharge Time    Apr 23, 2024               ANNA Benjamin

## 2024-04-23 NOTE — THERAPY DISCHARGE NOTE
Acute Care - Physical Therapy Discharge Summary  [facility]       Patient Name: Nic Castro  : 1975  MRN: [id number]    Today's Date: 2024                 Admit Date: 2024      PT Recommendation and Plan    Visit Dx:    ICD-10-CM ICD-9-CM   1. Impaired mobility [Z74.09]  Z74.09 799.89   2. Aneurysm of the ascending aorta, without rupture  I71.21 441.2   3. Anticoagulated on Coumadin  Z79.01 V58.61        Outcome Measures       Row Name 24 0905 24 1100          How much help from another person do you currently need...    Turning from your back to your side while in flat bed without using bedrails? 4  - 4  -ALFREDO     Moving from lying on back to sitting on the side of a flat bed without bedrails? 3  - 4  -ALFREDO     Moving to and from a bed to a chair (including a wheelchair)? 4  - 4  -ALFREDO     Standing up from a chair using your arms (e.g., wheelchair, bedside chair)? 4  - 4  -ALFREDO     Climbing 3-5 steps with a railing? 3  - 4  -ALFREDO     To walk in hospital room? 4  - 4  -ALFREDO     AM-PAC 6 Clicks Score (PT) 22  - 24  -ALFREDO     Highest Level of Mobility Goal 7 --> Walk 25 feet or more  - 8 --> Walked 250 feet or more  -        Functional Assessment    Outcome Measure Options AM-PAC 6 Clicks Basic Mobility (PT)  - --               User Key  (r) = Recorded By, (t) = Taken By, (c) = Cosigned By      Initials Name Provider Type    ALFREDO Ellen Huggins, PTA Physical Therapist Assistant     Rissa Alva PTA Physical Therapist Assistant                         PT Rehab Goals       Row Name 24 1500             Bed Mobility Goal 1 (PT)    Activity/Assistive Device (Bed Mobility Goal 1, PT) rolling to left;rolling to right;sit to supine;supine to sit  -AB      [place] Level/Cues Needed (Bed Mobility Goal 1, PT) independent  -AB      Time Frame (Bed Mobility Goal 1, PT) long term goal (LTG);10 days  -AB      Progress/Outcomes (Bed Mobility Goal 1, PT) goal not met  -AB          Transfer Goal 1 (PT)    Activity/Assistive Device (Transfer Goal 1, PT) sit-to-stand/stand-to-sit;bed-to-chair/chair-to-bed;toilet;tub  -AB      [place] Level/Cues Needed (Transfer Goal 1, PT) independent  -AB      Time Frame (Transfer Goal 1, PT) long term goal (LTG);10 days  -AB      Progress/Outcome (Transfer Goal 1, PT) goal met  -AB         Gait Training Goal 1 (PT)    Activity/Assistive Device (Gait Training Goal 1, PT) gait (walking locomotion);decrease fall risk;forward stepping;improve balance and speed;increase endurance/gait distance  -AB      [place] Level (Gait Training Goal 1, PT) supervision required  -AB      Distance (Gait Training Goal 1, PT) 500'  -AB      Time Frame (Gait Training Goal 1, PT) long term goal (LTG);10 days  -AB      Progress/Outcome (Gait Training Goal 1, PT) goal met  -AB         Stairs Goal 1 (PT)    Activity/Assistive Device (Stairs Goal 1, PT) ascending stairs;descending stairs;step-to-step;using handrail, left;decrease fall risk;improve balance and speed  -AB      [place] Level/Cues Needed (Stairs Goal 1, PT) standby assist  -AB      Number of Stairs (Stairs Goal 1, PT) 6  -AB      Time Frame (Stairs Goal 1, PT) long term goal (LTG);10 days  -AB      Progress/Outcome (Stairs Goal 1, PT) goal not met  -AB                User Key  (r) = Recorded By, (t) = Taken By, (c) = Cosigned By      Initials Name Provider Type Discipline    Bibi Amador PTA Physical Therapist Assistant PT                        PT Discharge Summary  Anticipated Discharge Disposition (PT): home with assist  Reason for Discharge: Discharge from facility  Outcomes Achieved: Refer to plan of care for updates on goals achieved  Discharge Destination: Home with assist      Bibi Ambrose PTA   4/23/2024

## 2024-04-23 NOTE — PLAN OF CARE
Goal Outcome Evaluation:  Plan of Care Reviewed With: patient        Progress: improving  Outcome Evaluation: Pt has continued to be disoriented throughout the night. Pt is able to answer questions appropriately but is forgetful, difficult to redirect and does not follow directions without repeating them. Pt reminded multiple times throughout the night about proper sternal precautions but continues to use arms at times when moving. Pt up unassissted multiple times during the night. Bed/chair alarm was utilized for pt safety. Pt c/o minimal discomfort through the night, requiring PRN medication once. Pt s 83-94 on the monitor with 3.45 sec of PAT up to 160. Call light has remained within reach, alarms activated, nonskid footwear in place. Pt distal portion of incision noted to have small amount of drainage, pt again strongly encouraged to follow proper sternal precautions.

## 2024-04-23 NOTE — DISCHARGE INSTRUCTIONS
Someone from the [facility] [place] will be calling to check on you during your recovery.

## 2024-04-23 NOTE — THERAPY TREATMENT NOTE
Acute Care - Physical Therapy Treatment Note   [place]     Patient Name: Nic Castro  : 1975  MRN: [id number]  Today's Date: 2024      Visit Dx:     ICD-10-CM ICD-9-CM   1. Impaired mobility [Z74.09]  Z74.09 799.89   2. Aneurysm of the ascending aorta, without rupture  I71.21 441.2   3. Anticoagulated on Coumadin  Z79.01 V58.61     Patient Active Problem List   Diagnosis    Aneurysm of the ascending aorta, without rupture    Chest pain    Dyslipidemia    Tobacco use    Overweight with body mass index (BMI) of 28 to 28.9 in adult    Aortic root aneurysm    Anticoagulated on Coumadin     Past Medical History:   Diagnosis Date    AAA (abdominal aortic aneurysm)     Anxiety     Arthritis     GERD (gastroesophageal reflux disease)     Hepatitis C     Hyperlipidemia     Hypertension     Pancreatitis     Seizure     from head injury    Substance abuse      Past Surgical History:   Procedure Laterality Date    ANKLE LIGAMENT RECONSTRUCTION Left     ASCENDING ARCH/HEMIARCH REPLACEMENT N/A 2024    Procedure: AORTIC ROOT REPLACEMENT, ASCENDING AORTIC HEMIARCH REPLACEMENT, AORTIC VALVE REPLACEMENT WITH MECHANICAL VALVE WITH CIRCULATORY ARREST, TRANSESOPHAGEAL ECHOCARDIOGRAM;  Surgeon: Sandeep Duran MD;  Location: [facility] OR;  Service: Cardiothoracic;  Laterality: N/A;    CARDIAC CATHETERIZATION N/A 2023    Procedure: Left Heart Cath;  Surgeon: Nikolas Ramos MD;  Location: [facility] CATH INVASIVE LOCATION;  Service: Cardiology;  Laterality: N/A;    TOOTH EXTRACTION      Full mouth extraction     PT Assessment (Last 12 Hours)       PT Evaluation and Treatment       Row Name 24 1123 24 [facility]       Physical Therapy Time and Intention    Subjective Information no complaints  -MF --    Document Type therapy note (daily note)  -MF --    Mode of Treatment physical therapy  -MF --    Session Not Performed -- other (see comments)  -MF    Comment, Session Not Performed -- nsg preparing to get  pt in shower.  -    Comment remains confused about simple tasks  - --      Row Name 04/23/24 1123          General Information    Existing Precautions/Restrictions fall;sternal  -     Limitations/Impairments safety/cognitive  -       Row Name 04/23/24 1123          Pain    Pretreatment Pain Rating 0/10 - no pain  -     Posttreatment Pain Rating 0/10 - no pain  -       Row Name 04/23/24 1123          Bed Mobility    Rolling Left [place] (Bed Mobility) supervision  -     Sidelying-Sit [place] (Bed Mobility) verbal cues;contact guard  -     Sit-Sidelying [place] (Bed Mobility) contact guard  -     Comment, (Bed Mobility) practiced bed mobility x 2  -MF       Row Name 04/23/24 1123          Transfers    Comment, (Transfers) cues for sternal precautions, stood x 2  -MF       Row Name 04/23/24 1123          Sit-Stand Transfer    Sit-Stand [place] (Transfers) independent  -       Row Name 04/23/24 1123          Stand-Sit Transfer    Stand-Sit [place] (Transfers) independent  -       Row Name 04/23/24 1123          Gait/Stairs (Locomotion)    [place] Level (Gait) independent  -     Distance in Feet (Gait) 600  -     [place] Level (Stairs) stand by assist  -     Handrail Location (Stairs) left side (ascending);right side (descending)  -     Number of Steps (Stairs) 18  -     Ascending Technique (Stairs) step-over-step  -     Descending Technique (Stairs) step-over-step  -       Row Name 04/23/24 1123          Motor Skills    Comments, Therapeutic Exercise cardiac warm ups  -       Row Name             Wound 04/17/24 0809 sternal Incision    Wound - Properties Group Placement Date: 04/17/24  - Placement Time: 0809  -LH Present on Original Admission: N  - Location: sternal  - Primary Wound Type: Incision  -LH    Retired Wound - Properties Group Placement Date: 04/17/24  - Placement Time: 0809  -LH Present on Original Admission: N  -LH Location:  sternal  -LH Primary Wound Type: Incision  -LH    Retired Wound - Properties Group Date first assessed: 04/17/24  -LH Time first assessed: 0809  -LH Present on Original Admission: N  -LH Location: sternal  -LH Primary Wound Type: Incision  -LH      Row Name             Wound 04/21/24 1130 Left Abrasion    Wound - Properties Group Placement Date: 04/21/24  -AG Placement Time: 1130  -AG Present on Original Admission: N  -AG Side: Left  -AG Primary Wound Type: Abrasion  -AG    Retired Wound - Properties Group Placement Date: 04/21/24  -AG Placement Time: 1130  -AG Present on Original Admission: N  -AG Side: Left  -AG Primary Wound Type: Abrasion  -AG    Retired Wound - Properties Group Date first assessed: 04/21/24  -AG Time first assessed: 1130  -AG Present on Original Admission: N  -AG Side: Left  -AG Primary Wound Type: Abrasion  -AG      Row Name 04/23/24 1123          Positioning and Restraints    Pre-Treatment Position in bed  -     Post Treatment Position chair  -MF     In Chair reclined;call light within reach;encouraged to call for assist  -MF               User Key  (r) = Recorded By, (t) = Taken By, (c) = Cosigned By      Initials Name Provider Type     Sofi Reid RN Registered Nurse    Rissa Colon PTA Physical Therapist Assistant     Marge Triplett RN Registered Nurse                    Physical Therapy Education       Title: PT OT SLP Therapies (Resolved)       Topic: Physical Therapy (Resolved)       Point: Mobility training (Resolved)       Learning Progress Summary             Patient Acceptance, E,D, DU,VU by ALFREDO at 4/21/2024 1139    Comment: bed transfers    Acceptance, E, VU by ALONA at 4/18/2024 0853    Comment: safe mobility, fall risk, sternal precautions                         Point: Home exercise program (Resolved)       Learning Progress Summary             Patient Acceptance, E, VU by ALONA at 4/18/2024 0853    Comment: safe mobility, fall risk, sternal precautions                          Point: Body mechanics (Resolved)       Learning Progress Summary             Patient Acceptance, E, VU by ALONA at 4/18/2024 0853    Comment: safe mobility, fall risk, sternal precautions                         Point: Precautions (Resolved)       Learning Progress Summary             Patient Acceptance, E,D, NR by ALFREDO at 4/20/2024 1142    Comment: sternal restrictions    Acceptance, E, VU by ALONA at 4/18/2024 0853    Comment: safe mobility, fall risk, sternal precautions                                         User Key       Initials Effective Dates Name Provider Type Discipline    ALFREDO 02/03/23 -  Ellen Huggins PTA Physical Therapist Assistant PT    ALONA 02/22/24 -  Sandeep Person, CARLITA Student PT Student PT                  PT Recommendation and Plan     Plan of Care Reviewed With: patient  Progress: no change  Outcome Evaluation: Pt. agreeable to therapy. He rates his pain 6-7/10 all over. He continues to over use his arms for transfers and shifting his weight in chair. Despite education and reminders, he continues to do use this arms. When  pt does follow this instructions, he is Independent with transfers. He walked 600' with 1 standing rest-with Supervision. O2 sat was 91-93% while on RA. Educated pt on the benefits of walking and activity following this surgery.   Outcome Measures       Row Name 04/23/24 1123 04/22/24 0905 04/21/24 1100       How much help from another person do you currently need...    Turning from your back to your side while in flat bed without using bedrails? 3  -MF 4  -MF 4  -ALFREDO    Moving from lying on back to sitting on the side of a flat bed without bedrails? 3  -MF 3  -MF 4  -ALFREDO    Moving to and from a bed to a chair (including a wheelchair)? 4  -MF 4  -MF 4  -ALFREDO    Standing up from a chair using your arms (e.g., wheelchair, bedside chair)? 4  -MF 4  -MF 4  -ALFREDO    Climbing 3-5 steps with a railing? 4  -MF 3  -MF 4  -ALFREDO    To walk in hospital room? 4  -MF 4  -MF 4  -ALFREDO     AM-PAC 6 Clicks Score (PT) 22  -MF 22  -MF 24  -ALFREDO    Highest Level of Mobility Goal 7 --> Walk 25 feet or more  -MF 7 --> Walk 25 feet or more  -MF 8 --> Walked 250 feet or more  -ALFREDO       Functional Assessment    Outcome Measure Options AM-PAC 6 Clicks Basic Mobility (PT)  -MF AM-PAC 6 Clicks Basic Mobility (PT)  -MF --              User Key  (r) = Recorded By, (t) = Taken By, (c) = Cosigned By      Initials Name Provider Type    Ellen Leslie, PTA Physical Therapist Assistant     Rissa Alva PTA Physical Therapist Assistant                     Time Calculation:    PT Charges       Row Name 04/23/24 1630             Time Calculation    Start Time 1123  -MF      Stop Time 1146  -MF      Time Calculation (min) 23 min  -MF      PT Received On 04/23/24  -MF         Time Calculation- PT    Total Timed Code Minutes- PT 23 minute(s)  -MF         Timed Charges    63623 - Gait Training Minutes  13  -MF      95666 - PT Therapeutic Activity Minutes 10  -MF         Total Minutes    Timed Charges Total Minutes 23  -MF       Total Minutes 23  -MF                User Key  (r) = Recorded By, (t) = Taken By, (c) = Cosigned By      Initials Name Provider Type     Rissa Alva PTA Physical Therapist Assistant                  Therapy Charges for Today       Code Description Service Date Service Provider Modifiers Qty    09642558457 HC GAIT TRAINING EA 15 MIN 4/22/2024 Rissa Alva, DOUG GP 2    63110647112 HC GAIT TRAINING EA 15 MIN 4/22/2024 Rissa Alva, PTA GP 1    53238075635 HC GAIT TRAINING EA 15 MIN 4/23/2024 Rissa Alva, PTA GP 1    12283729047 HC PT THERAPEUTIC ACT EA 15 MIN 4/23/2024 Rissa Alva, PTA GP 1            PT G-Codes  Outcome Measure Options: AM-PAC 6 Clicks Basic Mobility (PT)  AM-PAC 6 Clicks Score (PT): 22    Rissa Alva PTA  4/23/2024

## 2024-04-23 NOTE — DISCHARGE SUMMARY
[facility] Cardiothoracic Surgery  DISCHARGE SUMMARY        Date of Admission: 4/17/2024  Date of Discharge:  4/23/2024  Primary Care Physician: Provider, No Known    Discharge Diagnoses:  Active Hospital Problems    Diagnosis     **Aneurysm of the ascending aorta, without rupture     Anticoagulated on Coumadin     Overweight with body mass index (BMI) of 28 to 28.9 in adult     Aortic root aneurysm     Tobacco use        Procedures Performed:   Aortic Root Replacement with Mechanical Bentall (27/29 On-X Valved Conduit with 26mm Graft), Ascending Aortic Replacement with Hemiarch Replacement with 26mm Side Branched Dacron graft by Dr. Duran on 4/17/2024        HPI:  Mr. Nic Castro is a 48 y.o. male who was evaluated by Dr. Duran as an outpatient due to large aortic aneurysm measuring 6.1 cm in maximum dimension.  He was also found to have a bicuspid aortic valve.  It was recommended at that time that patient proceed with surgery however patient required full mouth extraction which took some time to accomplish due to social restraints.  Dr. Duran discussed with him the risks and benefits of proceeding with surgery, he understood these risks and agreed to proceed.  Long discussions with patient regarding valve type and ultimately he elected to proceed with mechanical valve with the understanding that he will need lifelong anticoagulation with Coumadin.    Hospital Course: On 4/17/2024, Mr. Castro was taken to the operating room for aortic root replacement with mechanical Bentall, ascending aortic replacement with hemiarch replacement.  See separate op note by Dr. Duran detailing the operation.  Following surgery he was transferred to the ICU in stable but guarded condition.  He remained hemodynamically stable and was extubated without remark during the evening hours following surgery.  Neurological exam was intact.  He met criteria for transfer to  on the afternoon of postop day 1.   Coumadin therapy was initiated on postop day 1 as well.  The remainder of his hospital stay was significant for encouraging pulmonary toilet and ambulation, reinforcing postoperative instructions and maintaining sternal precautions, diuresis, and pain control.  Pacing wires were removed on postop day 3.  Mediastinal chest tubes were removed on postop day 4.  He is eating well and is demonstrated adequate bowel function.  He is tolerating room air and has met all physical therapy goals.  Patient did have issues with compliance while in the hospital repeatedly breaking sternal precautions despite being reminded of the importance of maintaining them.  He was later found to have many social barriers and ultimately he informed us that he does not have a home to discharge to as his wife states that they are estranged and he cannot stay with her.  Multiple referrals were made to rehab facilities.  On the day of discharge wife states she is now able to take the patient home with her and the patient is agreeable to this.  He meets criteria for discharge home on postop day 6.    INR at the time of discharge is 2.57 and he will continue Coumadin 3 mg nightly.  He will take Lasix 20 mg daily for 5 days.  He will take Niferex for 30 days for postoperative anemia.  Follow-up with me on Thursday, 4/25/2024 with labs prior to appointment.    Condition on Discharge:  Neurologically intact and has good pain control.  He is eating well and has demonstrated good bowel function.  The sternum is stable without clicks. The heart is in normal sinus rhythm.  He has met all physical therapy criteria and verbalizes understanding of sternotomy precautions.   He verbalizes understanding of a separately handed out cardiac surgery handout.       Discharge Disposition:  Home or Self Care [1]    Discharge Medications:     Discharge Medications        New Medications        Instructions Start Date   furosemide 20 MG tablet  Commonly known as:  LASIX   20 mg, Oral, Daily      iron polysaccharides 150 MG capsule  Commonly known as: NIFEREX   150 mg, Oral, Daily   Start Date: April 24, 2024     metoprolol tartrate 25 MG tablet  Commonly known as: LOPRESSOR   25 mg, Oral, Every 12 Hours Scheduled      naloxone 4 MG/0.1ML nasal spray  Commonly known as: NARCAN   Call 911. Don't prime. Wood in 1 nostril for overdose. Repeat in 2-3 minutes in other nostril if no or minimal breathing/responsiveness.      pantoprazole 40 MG EC tablet  Commonly known as: PROTONIX   40 mg, Oral, Every Early Morning   Start Date: April 24, 2024     potassium chloride 10 MEQ CR capsule  Commonly known as: MICRO-K   10 mEq, Oral, Daily      traMADol 50 MG tablet  Commonly known as: ULTRAM   50 mg, Oral, Every 6 Hours PRN      warfarin 3 MG tablet  Commonly known as: COUMADIN   3 mg, Oral, Nightly             Continue These Medications        Instructions Start Date   Allergy Relief 10 MG tablet  Generic drug: loratadine   1 tablet, Oral, Daily      ARIPiprazole 5 MG tablet  Commonly known as: ABILIFY   1 tablet, Oral, Daily      aspirin 81 MG EC tablet   81 mg, Oral, Daily      clonazePAM 0.5 MG tablet  Commonly known as: KlonoPIN   1 tablet, Oral, 2 Times Daily PRN      hydrOXYzine 25 MG tablet  Commonly known as: ATARAX   25 mg, Oral, 3 Times Daily PRN      QUEtiapine 100 MG tablet  Commonly known as: SEROquel   100 mg, Oral, Nightly      sertraline 100 MG tablet  Commonly known as: ZOLOFT   2 tablets, Oral, Daily      SM Nicotine 14 MG/24HR patch  Generic drug: nicotine   1 patch, Transdermal, Every 24 Hours      nicotine 21 MG/24HR patch  Commonly known as: Nicoderm CQ   1 patch, Transdermal, Every 24 Hours      traZODone 100 MG tablet  Commonly known as: DESYREL   100 mg, Oral, Nightly             Stop These Medications      amoxicillin 875 MG tablet  Commonly known as: AMOXIL     prazosin 2 MG capsule  Commonly known as: MINIPRESS              Discharge Diet: Regular diet      Discharge Care Plan / Instructions: Please see the separately handed out cardiac surgery handout.  He will not be referred to cardiac rehab at this time due to recent sternotomy.  We will reassess at his postop follow-up visit.    Activity at Discharge:   No heavy lifting greater than 5 pounds or a gallon of milk while maintaining sternal precautions.  Nic Castro has been instructed on an exercise  regiment as detailed in a handed out cardiac surgery handout.    Tobacco:  Patient has been counseled as to smoking cessation. We discussed its benefits in regards to immediate recovery and the long-term benefits of avoidance of tobacco products.  We discussed the benefit of long-term health that tobacco cessation would convey.      BMI: BMI is >= 25 and <30. (Overweight) The following options were offered after discussion;: referral to primary care      Follow-up Appointments: Nic Castro  is requested to see Provider, No Known within 1-2 weeks from time of discharge, to see Mehnaz HALL on 4/25/2024 with labs prior to appointment, to follow-up with Dr. Duran in 6 weeks,  and to follow-up with Dr. Ramos of the cardiology service in 6 weeks.

## 2024-04-24 NOTE — PAYOR COMM NOTE
"Ref:  [id number]     [facility]  Fax  [phone]      Raj Castro (48 y.o. Male)       Date of Birth   1975    Social Security Number       Address   [address] [facility] [address]    Home Phone   [phone]    MRN   [id number]       Adventism   Other    Marital Status                               Admission Date   4/17/24    Admission Type   Elective    Admitting Provider   Sandeep Duran MD    Attending Provider       Department, Room/Bed   [facility] 4B, 435/1       Discharge Date   4/23/2024    Discharge Disposition   Home or Self Care    Discharge Destination                                 Attending Provider: (none)   Allergies: No Known Allergies    Isolation: None   Infection: None   Code Status: Prior    Ht: 180.3 cm (71\")   Wt: 89.3 kg (196 lb 12.8 oz)    Admission Cmt: None   Principal Problem: Aneurysm of the ascending aorta, without rupture [I71.21]                   Active Insurance as of 4/17/2024       Primary Coverage       Payor Plan Insurance Group Employer/Plan Group    [place] [id number]       Payor Plan Address Payor Plan Phone Number Payor Plan Fax Number Effective Dates    [employer] [address] [address] [phone]  3/1/2024 - None Entered    [facility] [id number]         Subscriber Name Subscriber Birth Date Member ID       RAJ CASTRO 1975 [id number]                     Emergency Contacts        (Rel.) Home Phone Work Phone Mobile Phone    Lubna Castro (Spouse) [phone] -- [phone]                 Discharge Summary        Mehnaz Pizano APRN at 04/23/24 1234       Attestation signed by Sandeep Duran MD at 04/23/24 1238    I have personally seen and examined Raj Castro and reviewed the record. Agree with the aforementioned plan rendered jointly with Mehnaz Pizano.    Sandeep Duran M.D.  Cardiothoracic Surgeon                          [facility] " Cardiothoracic Surgery  DISCHARGE SUMMARY        Date of Admission: 4/17/2024  Date of Discharge:  4/23/2024  Primary Care Physician: Provider, No Known    Discharge Diagnoses:  Active Hospital Problems    Diagnosis     **Aneurysm of the ascending aorta, without rupture     Anticoagulated on Coumadin     Overweight with body mass index (BMI) of 28 to 28.9 in adult     Aortic root aneurysm     Tobacco use        Procedures Performed:   Aortic Root Replacement with Mechanical Bentall (27/29 On-X Valved Conduit with 26mm Graft), Ascending Aortic Replacement with Hemiarch Replacement with 26mm Side Branched Dacron graft by Dr. Duran on 4/17/2024        HPI:  Mr. Nic Castro is a 48 y.o. male who was evaluated by Dr. Duran as an outpatient due to large aortic aneurysm measuring 6.1 cm in maximum dimension.  He was also found to have a bicuspid aortic valve.  It was recommended at that time that patient proceed with surgery however patient required full mouth extraction which took some time to accomplish due to social restraints.  Dr. Duran discussed with him the risks and benefits of proceeding with surgery, he understood these risks and agreed to proceed.  Long discussions with patient regarding valve type and ultimately he elected to proceed with mechanical valve with the understanding that he will need lifelong anticoagulation with Coumadin.    Hospital Course: On 4/17/2024, Mr. Castro was taken to the operating room for aortic root replacement with mechanical Bentall, ascending aortic replacement with hemiarch replacement.  See separate op note by Dr. Duran detailing the operation.  Following surgery he was transferred to the ICU in stable but guarded condition.  He remained hemodynamically stable and was extubated without remark during the evening hours following surgery.  Neurological exam was intact.  He met criteria for transfer to  on the afternoon of postop day 1.  Coumadin therapy was initiated on postop  day 1 as well.  The remainder of his hospital stay was significant for encouraging pulmonary toilet and ambulation, reinforcing postoperative instructions and maintaining sternal precautions, diuresis, and pain control.  Pacing wires were removed on postop day 3.  Mediastinal chest tubes were removed on postop day 4.  He is eating well and is demonstrated adequate bowel function.  He is tolerating room air and has met all physical therapy goals.  Patient did have issues with compliance while in the hospital repeatedly breaking sternal precautions despite being reminded of the importance of maintaining them.  He was later found to have many social barriers and ultimately he informed us that he does not have a home to discharge to as his wife states that they are estranged and he cannot stay with her.  Multiple referrals were made to rehab facilities.  On the day of discharge wife states she is now able to take the patient home with her and the patient is agreeable to this.  He meets criteria for discharge home on postop day 6.    INR at the time of discharge is 2.57 and he will continue Coumadin 3 mg nightly.  He will take Lasix 20 mg daily for 5 days.  He will take Niferex for 30 days for postoperative anemia.  Follow-up with me on Thursday, 4/25/2024 with labs prior to appointment.    Condition on Discharge:  Neurologically intact and has good pain control.  He is eating well and has demonstrated good bowel function.  The sternum is stable without clicks. The heart is in normal sinus rhythm.  He has met all physical therapy criteria and verbalizes understanding of sternotomy precautions.   He verbalizes understanding of a separately handed out cardiac surgery handout.       Discharge Disposition:  Home or Self Care [1]    Discharge Medications:     Discharge Medications        New Medications        Instructions Start Date   furosemide 20 MG tablet  Commonly known as: LASIX   20 mg, Oral, Daily      iron  polysaccharides 150 MG capsule  Commonly known as: NIFEREX   150 mg, Oral, Daily   Start Date: April 24, 2024     metoprolol tartrate 25 MG tablet  Commonly known as: LOPRESSOR   25 mg, Oral, Every 12 Hours Scheduled      naloxone 4 MG/0.1ML nasal spray  Commonly known as: NARCAN   Call 911. Don't prime. [place] in 1 nostril for overdose. Repeat in 2-3 minutes in other nostril if no or minimal breathing/responsiveness.      pantoprazole 40 MG EC tablet  Commonly known as: PROTONIX   40 mg, Oral, Every Early Morning   Start Date: April 24, 2024     potassium chloride 10 MEQ CR capsule  Commonly known as: MICRO-K   10 mEq, Oral, Daily      traMADol 50 MG tablet  Commonly known as: ULTRAM   50 mg, Oral, Every 6 Hours PRN      warfarin 3 MG tablet  Commonly known as: COUMADIN   3 mg, Oral, Nightly             Continue These Medications        Instructions Start Date   Allergy Relief 10 MG tablet  Generic drug: loratadine   1 tablet, Oral, Daily      ARIPiprazole 5 MG tablet  Commonly known as: ABILIFY   1 tablet, Oral, Daily      aspirin 81 MG EC tablet   81 mg, Oral, Daily      clonazePAM 0.5 MG tablet  Commonly known as: KlonoPIN   1 tablet, Oral, 2 Times Daily PRN      hydrOXYzine 25 MG tablet  Commonly known as: ATARAX   25 mg, Oral, 3 Times Daily PRN      QUEtiapine 100 MG tablet  Commonly known as: SEROquel   100 mg, Oral, Nightly      sertraline 100 MG tablet  Commonly known as: ZOLOFT   2 tablets, Oral, Daily      SM Nicotine 14 MG/24HR patch  Generic drug: nicotine   1 patch, Transdermal, Every 24 Hours      nicotine 21 MG/24HR patch  Commonly known as: Nicoderm CQ   1 patch, Transdermal, Every 24 Hours      traZODone 100 MG tablet  Commonly known as: DESYREL   100 mg, Oral, Nightly             Stop These Medications      amoxicillin 875 MG tablet  Commonly known as: AMOXIL     prazosin 2 MG capsule  Commonly known as: MINIPRESS              Discharge Diet: Regular diet     Discharge Care Plan / Instructions:  Please see the separately handed out cardiac surgery handout.  He will not be referred to cardiac rehab at this time due to recent sternotomy.  We will reassess at his postop follow-up visit.    Activity at Discharge:   No heavy lifting greater than 5 pounds or a gallon of milk while maintaining sternal precautions.  Nic Castro has been instructed on an exercise  regiment as detailed in a handed out cardiac surgery handout.    Tobacco:  Patient has been counseled as to smoking cessation. We discussed its benefits in regards to immediate recovery and the long-term benefits of avoidance of tobacco products.  We discussed the benefit of long-term health that tobacco cessation would convey.      BMI: BMI is >= 25 and <30. (Overweight) The following options were offered after discussion;: referral to primary care      Follow-up Appointments: Nic Castro  is requested to see Provider, No Known within 1-2 weeks from time of discharge, to see Mehnaz HALL on 4/25/2024 with labs prior to appointment, to follow-up with Dr. Duran in 6 weeks,  and to follow-up with Dr. Ramos of the cardiology service in 6 weeks.        Electronically signed by Amos Duran MD at 04/23/24 1238       Discharge Order (From admission, onward)       Start     Ordered    04/23/24 1231  Discharge patient  Once        Expected Discharge Date: 04/23/24   Discharge Disposition: Home or Self Care   Physician of Record for Attribution - Please select from Treatment Team: AMOS DURAN [[id number]]   Review needed by CMO to determine Physician of Record: No      Question Answer Comment   Physician of Record for Attribution - Please select from Treatment Team AMOS DURAN    Review needed by CMO to determine Physician of Record No        04/23/24 1233

## 2024-04-24 NOTE — OUTREACH NOTE
Prep Survey      Flowsheet Row Responses   [facility] facility patient discharged from? [place]   Is LACE score < 7 ? No   Eligibility Readm Mgmt   Discharge diagnosis ASCENDING ARCH/HEMIARCH REPLACEMENT   Does the patient have one of the following disease processes/diagnoses(primary or secondary)? Cardiothoracic surgery   Does the patient have Home health ordered? No   Is there a DME ordered? No   Prep survey completed? Yes            LILLY MUNOZ - Registered Nurse

## 2024-04-25 ENCOUNTER — TELEPHONE (OUTPATIENT)
Dept: CARDIAC SURGERY | Facility: CLINIC | Age: 49
End: 2024-04-25

## 2024-04-25 DIAGNOSIS — Z79.01 ANTICOAGULATED ON COUMADIN: Primary | ICD-10-CM

## 2024-04-25 NOTE — TELEPHONE ENCOUNTER
Patient canceled his appointment with me today.  This was for a postop follow-up with repeat PT/INR as patient is on Coumadin therapy.  Please contact patient and notify him that he at a minimum needs to come in today for repeat labs.  If he is not able to make office visit today okay to reschedule to Monday however it is very important that he have labs done to check his INR today.

## 2024-04-25 NOTE — TELEPHONE ENCOUNTER
Called [facility] to check status.  Pt has been in for labs and they have it noted to be faxed to us as soon as it is completed/surendra

## 2024-04-25 NOTE — TELEPHONE ENCOUNTER
Lab results rec'd.  I only faxed order for PT/INR but they did BMP & CBC as well.  All results are filed in the chart/kahm

## 2024-04-25 NOTE — TELEPHONE ENCOUNTER
Attempted to call pt re: this.  No answer.   message left requesting call back ASAP re: this.  [employer] message sent as well.  If pt calls back, can transfer call to me to address/surendra

## 2024-04-25 NOTE — TELEPHONE ENCOUNTER
Pt responded to [employer] message and lab order has been faxed to [facility]/surendra

## 2024-04-25 NOTE — TELEPHONE ENCOUNTER
When wife cx'd for today it was due to no transport in today.  Is it OK for them to have labs somewhere else if they cannot get here today for labs?/surendra

## 2024-04-25 NOTE — TELEPHONE ENCOUNTER
INR is 2.4.  Spoke with patient's wife, advised patient to continue Coumadin 3 mg nightly with plans to repeat PT/INR on Monday, 4/29/2024.  Please fax lab order to [facility].

## 2024-04-26 ENCOUNTER — READMISSION MANAGEMENT (OUTPATIENT)
Dept: CALL CENTER | Facility: HOSPITAL | Age: 49
End: 2024-04-26
Payer: MEDICAID

## 2024-04-26 NOTE — OUTREACH NOTE
CT Surgery Week 1 Survey      Flowsheet Row Responses   [facility] facility patient discharged from? [place]   Does the patient have one of the following disease processes/diagnoses(primary or secondary)? Cardiothoracic surgery   Week 1 attempt successful? No   Unsuccessful attempts Attempt 1  [All numbers listed were attempted-no answer]              Trudy H - Registered Nurse

## 2024-04-29 NOTE — TELEPHONE ENCOUNTER
No result rec'd so far.  Called [facility] outpt lab and pt has not been in today.  Attempted to call pt re: this.  No answer.  VM message left for pt to let us know when he is going for labs today/surendra

## 2024-05-01 ENCOUNTER — READMISSION MANAGEMENT (OUTPATIENT)
Dept: CALL CENTER | Facility: HOSPITAL | Age: 49
End: 2024-05-01
Payer: MEDICAID

## 2024-05-01 NOTE — OUTREACH NOTE
CT Surgery Week 1 Survey      Flowsheet Row Responses   [facility] facility patient discharged from? [place]   Does the patient have one of the following disease processes/diagnoses(primary or secondary)? Cardiothoracic surgery   Week 1 attempt successful? No   Unsuccessful attempts Attempt 2              Yareli MCDONALD - Licensed Nurse

## 2024-05-16 ENCOUNTER — READMISSION MANAGEMENT (OUTPATIENT)
Dept: CALL CENTER | Facility: HOSPITAL | Age: 49
End: 2024-05-16
Payer: MEDICAID

## 2024-05-16 NOTE — OUTREACH NOTE
CT Surgery Week 3 Survey      Flowsheet Row Responses   [facility] facility patient discharged from? [place]   Does the patient have one of the following disease processes/diagnoses(primary or secondary)? Cardiothoracic surgery   Week 3 attempt successful? No   Unsuccessful attempts Attempt 1            Jessica IVY - Registered Nurse

## 2024-05-22 ENCOUNTER — READMISSION MANAGEMENT (OUTPATIENT)
Dept: CALL CENTER | Facility: HOSPITAL | Age: 49
End: 2024-05-22
Payer: MEDICAID

## 2024-05-22 NOTE — OUTREACH NOTE
CT Surgery Week 3 Survey      Flowsheet Row Responses   [facility] facility patient discharged from? [place]   Does the patient have one of the following disease processes/diagnoses(primary or secondary)? Cardiothoracic surgery   Week 3 attempt successful? No   Unsuccessful attempts Attempt 2            Yareli MCDONALD - Licensed Nurse

## 2024-06-13 ENCOUNTER — TELEPHONE (OUTPATIENT)
Dept: VASCULAR SURGERY | Facility: CLINIC | Age: 49
End: 2024-06-13
Payer: MEDICAID

## 2024-06-13 NOTE — TELEPHONE ENCOUNTER
Called to confirm appointment with Molly 6/14/24. Patient's wife stated they have relocated to [place] and will follow there going forward.

## (undated) DEVICE — RADIFOCUS OPTITORQUE ANGIOGRAPHIC CATHETER: Brand: OPTITORQUE

## (undated) DEVICE — DRAIN,WOUND,ROUND,24FR,5/16",FULL-FLUTED: Brand: MEDLINE

## (undated) DEVICE — SUP ARMBRD ART/LINE BLU

## (undated) DEVICE — CATH F5 INF JR 4 100CM: Brand: INFINITI

## (undated) DEVICE — PK CATH CARD 30 CA/4

## (undated) DEVICE — TR BAND RADIAL ARTERY COMPRESSION DEVICE: Brand: TR BAND

## (undated) DEVICE — MODEL BT2000 P/N 700287-012KIT CONTENTS: MANIFOLD WITH SALINE AND CONTRAST PORTS, SALINE TUBING WITH SPIKE AND HAND SYRINGE, TRANSDUCER: Brand: BT2000 AUTOMATED MANIFOLD KIT

## (undated) DEVICE — BLAKE SILICONE DRAINS CARDIO CONNECTOR 2:1: Brand: BLAKE

## (undated) DEVICE — NDL CNTR 40CT FM MAG: Brand: MEDLINE INDUSTRIES, INC.

## (undated) DEVICE — SUT GORE TT9 CV6 30IN 6M06A

## (undated) DEVICE — CAUTRY HI TEMP FINETP

## (undated) DEVICE — SOL IRR NACL 0.9PCT BT 1000ML

## (undated) DEVICE — SUREFIT, DUAL DISPERSIVE ELECTRODE, CONTACT QUALITY MONITOR: Brand: SUREFIT

## (undated) DEVICE — CANN NASL ETCO2 LO/FLO A/

## (undated) DEVICE — GLIDESHEATH SLENDER STAINLESS STEEL KIT: Brand: GLIDESHEATH SLENDER

## (undated) DEVICE — GLV SURG BIOGEL M LTX PF 7 1/2

## (undated) DEVICE — SUT PROLN 5/0 RB2 D/A 30IN 8710H

## (undated) DEVICE — SOLIDIFIER LIQUI LOC PLUS 2000CC

## (undated) DEVICE — CLTH CLENS READYCLEANSE PERI CARE PK/5

## (undated) DEVICE — PK OPN HEART 30

## (undated) DEVICE — CATH IV ANGIO FEP 14GA 5.25IN ORNG 10PK

## (undated) DEVICE — GLV SURG BIOGEL LTX PF 6 1/2

## (undated) DEVICE — SUT PROLN 3/0 SH D/A 36IN 8522H

## (undated) DEVICE — BAPTIST TURNOVER KIT: Brand: MEDLINE INDUSTRIES, INC.

## (undated) DEVICE — TRAP FLD MINIVAC MEGADYNE 100ML

## (undated) DEVICE — SUT SILK 2/0 FS BLK 18IN 685G

## (undated) DEVICE — INTENDED FOR TISSUE SEPARATION, AND OTHER PROCEDURES THAT REQUIRE A SHARP SURGICAL BLADE TO PUNCTURE OR CUT.: Brand: BARD-PARKER ® STAINLESS STEEL BLADES

## (undated) DEVICE — GW STARTER FXD CORE J .035 3X150CM 3MM

## (undated) DEVICE — OASIS DRAIN, SINGLE, INLINE & ATS COMPATIBLE: Brand: OASIS

## (undated) DEVICE — SUT ETHIB 2/0 SH SH 36IN X523H

## (undated) DEVICE — SUT POLY BR TP 2STRND 1/8X30IN

## (undated) DEVICE — Device: Brand: RETRACT-O-TAPE 18G X 30.5CM BLUNT NEEDLE

## (undated) DEVICE — MODEL AT P65, P/N 701554-001KIT CONTENTS: HAND CONTROLLER, 3-WAY HIGH-PRESSURE STOPCOCK WITH ROTATING END AND PREMIUM HIGH-PRESSURE TUBING: Brand: ANGIOTOUCH® KIT

## (undated) DEVICE — STERNUM BLADE, OFFSET (32.0 X 0.8 X 6.3MM)

## (undated) DEVICE — SUT PROLN 4/0 RB1 36IN 4PK M8557

## (undated) DEVICE — FOGARTY - HYDRAGRIP SURGICAL - CLAMP INSERTS: Brand: FOGARTY HYDRAJAW

## (undated) DEVICE — SYR LUERLOK 20CC BX/50

## (undated) DEVICE — BG OR ZSUT SADDLE 20IN CLR STRL

## (undated) DEVICE — PAD, DEFIB, ADULT, RADIOTRANS, PHYSIO: Brand: MEDLINE

## (undated) DEVICE — PK SET UP ANES OPN HEART 30